# Patient Record
Sex: FEMALE | Race: WHITE | Employment: UNEMPLOYED | URBAN - NONMETROPOLITAN AREA
[De-identification: names, ages, dates, MRNs, and addresses within clinical notes are randomized per-mention and may not be internally consistent; named-entity substitution may affect disease eponyms.]

---

## 2017-02-13 ENCOUNTER — TELEPHONE (OUTPATIENT)
Dept: AUDIOLOGY | Age: 52
End: 2017-02-13

## 2017-02-15 ENCOUNTER — TELEPHONE (OUTPATIENT)
Dept: AUDIOLOGY | Age: 52
End: 2017-02-15

## 2017-02-27 ENCOUNTER — OFFICE VISIT (OUTPATIENT)
Dept: AUDIOLOGY | Age: 52
End: 2017-02-27

## 2017-02-27 DIAGNOSIS — H90.3 SENSORINEURAL HEARING LOSS, BILATERAL: Primary | ICD-10-CM

## 2018-02-14 ENCOUNTER — HOSPITAL ENCOUNTER (OUTPATIENT)
Dept: AUDIOLOGY | Age: 53
Discharge: HOME | End: 2018-02-14

## 2018-02-14 PROCEDURE — 9990000010 HC NO CHARGE VISIT: Performed by: AUDIOLOGIST

## 2018-03-06 ENCOUNTER — HOSPITAL ENCOUNTER (OUTPATIENT)
Dept: AUDIOLOGY | Age: 53
Discharge: HOME | End: 2018-03-06

## 2018-03-06 PROCEDURE — 9990000010 HC NO CHARGE VISIT: Performed by: AUDIOLOGIST

## 2018-03-15 ENCOUNTER — TELEPHONE (OUTPATIENT)
Dept: AUDIOLOGY | Age: 53
End: 2018-03-15

## 2018-03-20 ENCOUNTER — HOSPITAL ENCOUNTER (OUTPATIENT)
Dept: AUDIOLOGY | Age: 53
Discharge: HOME | End: 2018-03-20

## 2018-03-20 PROCEDURE — 9990000010 HC NO CHARGE VISIT: Performed by: AUDIOLOGIST

## 2019-03-20 ENCOUNTER — HOSPITAL ENCOUNTER (OUTPATIENT)
Dept: AUDIOLOGY | Age: 54
Discharge: HOME | End: 2019-03-20

## 2019-03-20 PROCEDURE — 92592 HC HEARING AID CHECK, ONE EAR: CPT | Performed by: AUDIOLOGIST

## 2020-06-23 ENCOUNTER — HOSPITAL ENCOUNTER (OUTPATIENT)
Dept: AUDIOLOGY | Age: 55
Discharge: HOME | End: 2020-06-23

## 2020-06-23 PROCEDURE — 92592 HC HEARING AID CHECK, ONE EAR: CPT | Performed by: AUDIOLOGIST

## 2020-06-26 ENCOUNTER — TELEPHONE (OUTPATIENT)
Dept: AUDIOLOGY | Age: 55
End: 2020-06-26

## 2020-09-10 ENCOUNTER — HOSPITAL ENCOUNTER (OUTPATIENT)
Age: 55
Setting detail: SPECIMEN
Discharge: HOME | End: 2020-09-10
Payer: MEDICARE

## 2020-09-10 LAB
ABSOLUTE EOS #: 0.67 K/UL (ref 0–0.44)
ABSOLUTE IMMATURE GRANULOCYTE: <0.03 K/UL (ref 0–0.3)
ABSOLUTE LYMPH #: 2.42 K/UL (ref 1.1–3.7)
ABSOLUTE MONO #: 0.5 K/UL (ref 0.1–1.2)
ALBUMIN SERPL-MCNC: 4.3 G/DL (ref 3.5–5.2)
ALBUMIN/GLOBULIN RATIO: 1.3 (ref 1–2.5)
ALP BLD-CCNC: 73 U/L (ref 35–104)
ALT SERPL-CCNC: 48 U/L (ref 5–33)
ANION GAP SERPL CALCULATED.3IONS-SCNC: 18 MMOL/L (ref 9–17)
AST SERPL-CCNC: 50 U/L
BASOPHILS # BLD: 1 % (ref 0–2)
BASOPHILS ABSOLUTE: 0.06 K/UL (ref 0–0.2)
BILIRUB SERPL-MCNC: 0.39 MG/DL (ref 0.3–1.2)
BUN BLDV-MCNC: 9 MG/DL (ref 6–20)
BUN/CREAT BLD: ABNORMAL (ref 9–20)
CALCIUM SERPL-MCNC: 10.2 MG/DL (ref 8.6–10.4)
CHLORIDE BLD-SCNC: 102 MMOL/L (ref 98–107)
CHOLESTEROL, FASTING: 136 MG/DL
CHOLESTEROL/HDL RATIO: 3.8
CO2: 20 MMOL/L (ref 20–31)
CREAT SERPL-MCNC: 0.54 MG/DL (ref 0.5–0.9)
DIFFERENTIAL TYPE: ABNORMAL
EOSINOPHILS RELATIVE PERCENT: 9 % (ref 1–4)
GFR AFRICAN AMERICAN: >60 ML/MIN
GFR NON-AFRICAN AMERICAN: >60 ML/MIN
GFR SERPL CREATININE-BSD FRML MDRD: ABNORMAL ML/MIN/{1.73_M2}
GFR SERPL CREATININE-BSD FRML MDRD: ABNORMAL ML/MIN/{1.73_M2}
GLUCOSE BLD-MCNC: 142 MG/DL (ref 70–99)
HCT VFR BLD CALC: 41.4 % (ref 36.3–47.1)
HDLC SERPL-MCNC: 36 MG/DL
HEMOGLOBIN: 13.1 G/DL (ref 11.9–15.1)
IMMATURE GRANULOCYTES: 0 %
LDL CHOLESTEROL: 55 MG/DL (ref 0–130)
LYMPHOCYTES # BLD: 31 % (ref 24–43)
MCH RBC QN AUTO: 29.7 PG (ref 25.2–33.5)
MCHC RBC AUTO-ENTMCNC: 31.6 G/DL (ref 28.4–34.8)
MCV RBC AUTO: 93.9 FL (ref 82.6–102.9)
MONOCYTES # BLD: 7 % (ref 3–12)
NRBC AUTOMATED: 0 PER 100 WBC
PDW BLD-RTO: 13.8 % (ref 11.8–14.4)
PLATELET # BLD: 311 K/UL (ref 138–453)
PLATELET ESTIMATE: ABNORMAL
PMV BLD AUTO: 11.8 FL (ref 8.1–13.5)
POTASSIUM SERPL-SCNC: 4.2 MMOL/L (ref 3.7–5.3)
RBC # BLD: 4.41 M/UL (ref 3.95–5.11)
RBC # BLD: ABNORMAL 10*6/UL
SEG NEUTROPHILS: 52 % (ref 36–65)
SEGMENTED NEUTROPHILS ABSOLUTE COUNT: 4.05 K/UL (ref 1.5–8.1)
SODIUM BLD-SCNC: 140 MMOL/L (ref 135–144)
TOTAL PROTEIN: 7.5 G/DL (ref 6.4–8.3)
TRIGLYCERIDE, FASTING: 224 MG/DL
TSH SERPL DL<=0.05 MIU/L-ACNC: 2.31 MIU/L (ref 0.3–5)
VLDLC SERPL CALC-MCNC: ABNORMAL MG/DL (ref 1–30)
WBC # BLD: 7.7 K/UL (ref 3.5–11.3)
WBC # BLD: ABNORMAL 10*3/UL

## 2021-06-11 ENCOUNTER — OFFICE VISIT (OUTPATIENT)
Dept: BARIATRICS/WEIGHT MGMT | Age: 56
End: 2021-06-11
Payer: MEDICARE

## 2021-06-11 VITALS
DIASTOLIC BLOOD PRESSURE: 74 MMHG | TEMPERATURE: 97 F | HEART RATE: 66 BPM | HEIGHT: 68 IN | WEIGHT: 293 LBS | SYSTOLIC BLOOD PRESSURE: 128 MMHG | BODY MASS INDEX: 44.41 KG/M2

## 2021-06-11 DIAGNOSIS — F32.A DEPRESSION, UNSPECIFIED DEPRESSION TYPE: ICD-10-CM

## 2021-06-11 DIAGNOSIS — E66.01 MORBID OBESITY (HCC): Primary | ICD-10-CM

## 2021-06-11 DIAGNOSIS — E78.00 HYPERCHOLESTEREMIA: ICD-10-CM

## 2021-06-11 DIAGNOSIS — G89.29 CHRONIC PAIN OF LEFT KNEE: ICD-10-CM

## 2021-06-11 DIAGNOSIS — I10 HYPERTENSION, UNSPECIFIED TYPE: ICD-10-CM

## 2021-06-11 DIAGNOSIS — E03.9 HYPOTHYROIDISM, UNSPECIFIED TYPE: ICD-10-CM

## 2021-06-11 DIAGNOSIS — K76.0 FATTY LIVER DISEASE, NONALCOHOLIC: ICD-10-CM

## 2021-06-11 DIAGNOSIS — G89.29 CHRONIC LOW BACK PAIN, UNSPECIFIED BACK PAIN LATERALITY, UNSPECIFIED WHETHER SCIATICA PRESENT: ICD-10-CM

## 2021-06-11 DIAGNOSIS — E28.2 POLYCYSTIC OVARIAN DISEASE: ICD-10-CM

## 2021-06-11 DIAGNOSIS — F41.9 ANXIETY: ICD-10-CM

## 2021-06-11 DIAGNOSIS — G47.30 SLEEP APNEA, UNSPECIFIED TYPE: ICD-10-CM

## 2021-06-11 DIAGNOSIS — K58.9 IRRITABLE BOWEL SYNDROME, UNSPECIFIED TYPE: ICD-10-CM

## 2021-06-11 DIAGNOSIS — K21.9 GASTROESOPHAGEAL REFLUX DISEASE, UNSPECIFIED WHETHER ESOPHAGITIS PRESENT: ICD-10-CM

## 2021-06-11 DIAGNOSIS — M25.562 CHRONIC PAIN OF LEFT KNEE: ICD-10-CM

## 2021-06-11 DIAGNOSIS — M54.50 CHRONIC LOW BACK PAIN, UNSPECIFIED BACK PAIN LATERALITY, UNSPECIFIED WHETHER SCIATICA PRESENT: ICD-10-CM

## 2021-06-11 PROCEDURE — 99204 OFFICE O/P NEW MOD 45 MIN: CPT | Performed by: SURGERY

## 2021-06-11 RX ORDER — ATORVASTATIN CALCIUM 40 MG/1
40 TABLET, FILM COATED ORAL NIGHTLY
COMMUNITY

## 2021-06-11 RX ORDER — CLONIDINE HYDROCHLORIDE 0.1 MG/1
0.1 TABLET ORAL 2 TIMES DAILY
COMMUNITY

## 2021-06-11 RX ORDER — TRAMADOL HYDROCHLORIDE 50 MG/1
50 TABLET ORAL EVERY 6 HOURS PRN
COMMUNITY
End: 2021-08-04

## 2021-06-11 RX ORDER — CHOLECALCIFEROL (VITAMIN D3) 125 MCG
5000 CAPSULE ORAL DAILY
COMMUNITY
End: 2022-03-22 | Stop reason: DRUGHIGH

## 2021-06-11 RX ORDER — HYDROCHLOROTHIAZIDE 50 MG/1
50 TABLET ORAL DAILY
COMMUNITY
Start: 2021-06-02

## 2021-06-11 RX ORDER — GABAPENTIN 600 MG/1
600 TABLET ORAL 3 TIMES DAILY
COMMUNITY

## 2021-06-11 RX ORDER — SITAGLIPTIN 100 MG/1
TABLET, FILM COATED ORAL
COMMUNITY
Start: 2021-06-02 | End: 2021-09-01

## 2021-06-13 ASSESSMENT — ENCOUNTER SYMPTOMS
NAUSEA: 0
DIARRHEA: 0
BLOOD IN STOOL: 0
BACK PAIN: 1
RECTAL PAIN: 0
FACIAL SWELLING: 0
RHINORRHEA: 0
VOICE CHANGE: 0
TROUBLE SWALLOWING: 0
CHEST TIGHTNESS: 0
EYE ITCHING: 0
EYE REDNESS: 0
VOMITING: 0
EYE DISCHARGE: 0
STRIDOR: 0
CHOKING: 0
PHOTOPHOBIA: 0
ANAL BLEEDING: 0
SORE THROAT: 0
COLOR CHANGE: 0
ABDOMINAL PAIN: 0
EYE PAIN: 0
WHEEZING: 0
ABDOMINAL DISTENTION: 0
SINUS PRESSURE: 0
COUGH: 0
ALLERGIC/IMMUNOLOGIC NEGATIVE: 1
CONSTIPATION: 0
SHORTNESS OF BREATH: 0
APNEA: 0

## 2021-06-13 NOTE — PROGRESS NOTES
Greater than 50% of the time was involved counseling, educaton and coordinating care. SUBJECTIVE/OBJECTIVE:    Chief Complaint   Patient presents with    Bariatric, Initial Visit     New Patient Surgery      HPI  Michelle Villa is a 58-year-old female who presents for initial evaluation at the weight management program secondary to her morbid obesity. BMI 48. She has multiple chronic comorbid conditions. Current weight 317 pounds. Denies tobacco abuse. She states she has been overweight since childhood. Has 2 children ages 27 and 39. She has tried multiple times at weight loss without long-term success. She is tried meal replacement programs and medications. She is also tried commercial programs such as [employer] as well as [employer] multiple times. She is also tried other specific low calorie as well as high-protein/low carbohydrate diets without long-term success. She states that one time ketogenic diet allowed her to lose 60 pounds but unfortunately she gained it all back and then some. She states she is in need of a left knee replacement but has to lose weight prior to this. She is not able to do things physically because of the excess body weight. Admits that the excess body weight is also affecting her socially/mentally. Denies current chest or abdominal pain. No hematochezia or melena. No new urinary complaints. Admits that if she is not able to lose enough adequate excess body weight with medical management only she would like to proceed with a Monet-en-Y gastric bypass. Review of Systems   Constitutional: Negative for activity change, appetite change, chills, diaphoresis, fatigue, fever and unexpected weight change. HENT: Negative for congestion, dental problem, drooling, ear discharge, ear pain, facial swelling, hearing loss, mouth sores, nosebleeds, postnasal drip, rhinorrhea, sinus pressure, sneezing, sore throat, tinnitus, trouble swallowing and voice change.     Eyes: Negative for photophobia, pain, discharge, redness, itching and visual disturbance. Respiratory: Negative for apnea, cough, choking, chest tightness, shortness of breath, wheezing and stridor. Cardiovascular: Negative for chest pain, palpitations and leg swelling. Gastrointestinal: Negative for abdominal distention, abdominal pain, anal bleeding, blood in stool, constipation, diarrhea, nausea, rectal pain and vomiting. Endocrine: Negative. Genitourinary: Negative for decreased urine volume, difficulty urinating, dyspareunia, dysuria, enuresis, flank pain, frequency, genital sores, hematuria, menstrual problem, pelvic pain, urgency, vaginal bleeding, vaginal discharge and vaginal pain. Musculoskeletal: Positive for arthralgias, back pain, joint swelling and myalgias. Negative for gait problem, neck pain and neck stiffness. Skin: Negative for color change, pallor, rash and wound. Allergic/Immunologic: Negative. Neurological: Negative for dizziness, tremors, seizures, syncope, facial asymmetry, speech difficulty, weakness, light-headedness, numbness and headaches. Hematological: Negative for adenopathy. Does not bruise/bleed easily. Psychiatric/Behavioral: Negative for agitation, behavioral problems, confusion, decreased concentration, dysphoric mood, hallucinations, self-injury, sleep disturbance and suicidal ideas. The patient is not nervous/anxious and is not hyperactive.         Past Medical History:   Diagnosis Date    Depression     GERD (gastroesophageal reflux disease)     Hypertension     Hypothyroidism     Irritable bowel syndrome     Osteoarthritis     Type II or unspecified type diabetes mellitus without mention of complication, not stated as uncontrolled        Past Surgical History:   Procedure Laterality Date    CHOLECYSTECTOMY      [facility]ONOS[facility]  longer than 3 yr ago    [facility]    KNEE ARTHROSCOPY Left     x2    TUBAL LIGATION         Current Outpatient Medications Cigarettes     Quit date: 4/15/1991     Years since quittin.1    Smokeless tobacco: Never Used   Substance and Sexual Activity    Alcohol use: Not Currently    Drug use: No    Sexual activity: Not on file   Other Topics Concern    Not on file   Social History Narrative    Not on file     Social Determinants of Health     Financial Resource Strain:     Difficulty of Paying Living Expenses:    Food Insecurity:     Worried About Running Out of Food in the Last Year:     [address] in the Last Year:    Transportation Needs:     Lack of Transportation (Medical):  Lack of Transportation (Non-Medical):    Physical Activity:     Days of Exercise per Week:     Minutes of Exercise per Session:    Stress:     Feeling of Stress :    Social Connections:     Frequency of Communication with Friends and Family:     Frequency of Social Gatherings with Friends and Family:     Attends Jainism Services:     Active Member of Clubs or Organizations:     Attends Club or Organization Meetings:     Marital Status:    Intimate Partner Violence:     Fear of Current or Ex-Partner:     Emotionally Abused:     Physically Abused:     Sexually Abused:      Vitals:    21 1004   BP: 128/74   Site: Left Upper Arm   Position: Sitting   Cuff Size: Large Adult   Pulse: 66   Temp: 97 °F (36.1 °C)   TempSrc: Infrared   Weight: (!) 317 lb (143.8 kg)   Height: 5' 8\" (1.727 m)     Body mass index is 48.2 kg/m². Wt Readings from Last 3 Encounters:   21 (!) 317 lb (143.8 kg)   04/15/13 (!) 309 lb (140.2 kg)     Physical Exam  Vitals reviewed. Constitutional:       General: She is not in acute distress. Appearance: She is well-developed. She is not diaphoretic. HENT:      Head: Normocephalic and atraumatic. Right Ear: External ear normal.      Left Ear: External ear normal.      Nose: Nose normal.   Eyes:      General: No scleral icterus. Right eye: No discharge.          Left eye: No discharge. Conjunctiva/sclera: Conjunctivae normal.   Cardiovascular:      Rate and Rhythm: Normal rate and regular rhythm. Heart sounds: Normal heart sounds. Pulmonary:      Effort: Pulmonary effort is normal. No respiratory distress. Breath sounds: Normal breath sounds. No wheezing or rales. Chest:      Chest wall: No tenderness. Abdominal:      General: Bowel sounds are normal. There is no distension. Palpations: Abdomen is soft. There is no mass. Tenderness: There is no abdominal tenderness. There is no guarding or rebound. Musculoskeletal:         General: No tenderness. Normal range of motion. Cervical back: Normal range of motion and neck supple. Skin:     General: Skin is warm and dry. Coloration: Skin is not pale. Findings: No erythema or rash. Neurological:      Mental Status: She is alert and oriented to person, place, and time. Cranial Nerves: No cranial nerve deficit. Psychiatric:         Behavior: Behavior normal.         Thought Content:  Thought content normal.         Judgment: Judgment normal.       CBC   Lab Results   Component Value Date    WBC 7.7 09/10/2020    RBC 4.41 09/10/2020    HGB 13.1 09/10/2020    HCT 41.4 09/10/2020    MCV 93.9 09/10/2020    MCH 29.7 09/10/2020    MCHC 31.6 09/10/2020    RDW 13.8 09/10/2020     09/10/2020    MPV 11.8 09/10/2020    RBCMORP NOT REPORTED 09/10/2020    MONOPCT 7 09/10/2020    LYMPHSABS 2.42 09/10/2020    MONOSABS 0.50 09/10/2020    EOSABS 0.67 09/10/2020    BASOSABS 0.06 09/10/2020      BMP/CMP   Lab Results   Component Value Date    GLUCOSE 142 09/10/2020    CREATININE 0.54 09/10/2020    BUN 9 09/10/2020     09/10/2020    K 4.2 09/10/2020     09/10/2020    CO2 20 09/10/2020    CALCIUM 10.2 09/10/2020    AST 50 09/10/2020    ALKPHOS 73 09/10/2020    PROT 7.5 09/10/2020    LABALBU 4.3 09/10/2020    BILITOT 0.39 09/10/2020    ALT 48 09/10/2020      PREALBUMIN   No results found for: PREALBUMIN   VITAMIN B12   No results found for: ELUGWUBN17   VITAMIN D   No results found for: VITD25   PTH  No results found for: IPTH  VITAMIN B1/ THIAMINE   No results found for: TGSD4OXMNJE   LIPID SCREEN (FASTING)   Lab Results   Component Value Date    HDL 36 09/10/2020   ,   HGA1C (T2DM ONLY)   No results found for: LABA1C, AVGG   TSH   Lab Results   Component Value Date    TSH 2.31 09/10/2020      IRON   No results found for: IRON   TIBC  No results found for: TIBC  FERRITIN  No results found for: FERRITIN  VITAMIN A  No results found for: RETINOL  NICOTINE  No results found for: NMET  UDS  No results found for: UDP  PSA  No results found for: LABPSA  GFR  Lab Results   Component Value Date    LABGLOM >60 09/10/2020     DEXA  No results found for this or any previous visit. Imaging - none    There is no problem list on file for this patient. An electronic signature was used to authenticate this note.     --Sarah Jacinto MD

## 2021-07-02 NOTE — PROGRESS NOTES
sugar free creamer   Snack: no.  Lunch: yes. 12:00p- makes food for pt and boyfriend - sandwich, soup or ravioli, or eggs and leos, waffles  Snack: yes. I have been eating boyfriends leftovers lately d/t boredom  Dinner: yes. 4p-6p- last nite had [employer] queen- 1/2 pound burger and large segal or may bake chicken, crock pot roast, may have green beans and carrots  Snack: yes. - lately getting into ice cream, and had a candy bar last nite  Drinks throughout the day: 1-2 cups coffee per day, 24 oz water glass fills this up twice or more, one diet pepsi or diet dr lovell per day    Do you drink alcohol? No.     Patient does not meet the criteria for binge eating disorder. Patient does have grazing. Patient does not have night eating. Patient does have a history of emotional eating or eating out of boredom. It does take an unusually large amount of food for the patient to feel comfortably full. Patient describes self as fast eater      Patient describes level of activity as sedentary- recent knee ablation. Patient will plan to attend Fitness Orientation on: to be determined with pt coming further away. Gave pt chair exercise booklet to start at home. Pt also belongs to [facility] and will start swimming again    Assessment  Nutritional Needs: [place]-St Jeor = 2082 kcal x 1.2 (activity factor)= 2498 kcal - 500-1000 calories/day  (for 1-2 lb weight loss/week)= 5408-3083 calories per day  Food recall reveals increased refined carbohydrates, increased eating out. Low vegetables and fruit intake  PES Statement:  Overweight/Obesity related to lack of exercise, sedentary lifestyle, unhealthy eating habits, and unsuccessful diet attempts as evidenced by . Body mass index is 48.5 kg/m². .    Surgery  Surgical procedure desired: gastric bypass  Patient's greatest concern about having surgery is: No concerns. Patient describes the motivation for weight loss surgery to be: I want to be able to active with my grandkids.     The expectations following the surgery were reviewed in detail with patient today and patient made aware will require to eliminate carbonated beverages, aim for regular meal times, monitor portion sizes, and balanced meals. .   she does feel she can deal with the dietary restrictions. Patient states she does understand the consequences of not complying with post-op food guidelines. Patient states she understands the long term changes in food intake that will be necessary for all occasions after surgery for the rest of her life. Patient is deemed nutritionally appropriate to proceed if able to show progress towards nutrition behavior changes discussed today. Plan  Patient will begin working on recommendations from Pre-Weight Loss Surgery Behavior Change Goal Sheet that was reviewed today to assist with weight loss and establish a  long term healthy eating pattern. Individual goals set with patient to be reviewed at monthly office visits. Weight loss goal of 3-5% from initial weight at first visit with Dr Lindsey Orofino reviewed with patient to achieve over 3-6 month period per insurance requirements. Initial weight was: 317lbs   3-5% Weight Loss goal will be minimum of: 10-16  lbs weight loss. Plan/Recommendations:    Educational handouts provided and reviewed with patient as follows: Online Support Groups, My Plate Picture Method, Portion Size Guide, Food Journal    -  Patient Instructions   Goals:  1. I will get the lab work done that is mailed to my mailing address before next office visit. You will need to fast 10-12 hours for this (no food or drink besides water). 2.  I will aim for at least 64 oz of water each day (= 8 cups per day or four bottled gomez)  3. I will use my food journal to record meal times, serving sizes and bring back to next dietitian visit. 4.  I will increase my physical activity by going to [facility] for swimming twice a week.   Chair exercises on other days for at least ~ 15-20 minutes daily. 5  Initial weight loss goal of 3-5% is recommended over 3-6 month period. This is a minimum of: 10-16 lbs from your weight when initially met with physician of: 317 lbs. 6.  Bring back info on where referral for mammogram and dexa would go at [address] [place] [address] visit: 4 weeks with dietitian.        Jorge November, RD, LD RD, LD  Dietitian- Weight Management [facility] [address]

## 2021-07-06 ENCOUNTER — OFFICE VISIT (OUTPATIENT)
Dept: BARIATRICS/WEIGHT MGMT | Age: 56
End: 2021-07-06

## 2021-07-06 VITALS — BODY MASS INDEX: 44.41 KG/M2 | HEIGHT: 68 IN | WEIGHT: 293 LBS

## 2021-07-06 DIAGNOSIS — Z78.0 POST-MENOPAUSAL: ICD-10-CM

## 2021-07-06 DIAGNOSIS — Z13.21 MALNUTRITION SCREEN: ICD-10-CM

## 2021-07-06 DIAGNOSIS — E66.01 MORBID OBESITY WITH BMI OF 45.0-49.9, ADULT (HCC): ICD-10-CM

## 2021-07-06 DIAGNOSIS — Z12.31 ENCOUNTER FOR SCREENING MAMMOGRAM FOR MALIGNANT NEOPLASM OF BREAST: ICD-10-CM

## 2021-07-06 DIAGNOSIS — E66.01 MORBID OBESITY WITH BMI OF 45.0-49.9, ADULT (HCC): Primary | ICD-10-CM

## 2021-07-06 DIAGNOSIS — Z01.818 PRE-OP TESTING: Primary | ICD-10-CM

## 2021-07-06 NOTE — PATIENT INSTRUCTIONS
Goals: 1. I will get the lab work done that is mailed to my mailing address before next office visit. You will need to fast 10-12 hours for this (no food or drink besides water). 2.  I will aim for at least 64 oz of water each day (= 8 cups per day or four bottled gomez)  3. I will use my food journal to record meal times, serving sizes and bring back to next dietitian visit. 4.  I will increase my physical activity by going to [facility] for swimming twice a week. Chair exercises on other days for at least ~ 15-20 minutes daily. 5  Initial weight loss goal of 3-5% is recommended over 3-6 month period. This is a minimum of: 10-16 lbs from your weight when initially met with physician of: 317 lbs.     6.  Bring back info on where referral for mammogram and dexa would go at [facility]

## 2021-08-03 NOTE — PROGRESS NOTES
Assessment: Patient is a 61 y.o. female seen for   month one  follow up MNT visit for  pre op bariatric surgery desires sleeve    Vitals from current and previous visits:  Vitals 1/2/9487   SYSTOLIC 654   DIASTOLIC 76   Site Right Upper Arm   Position Sitting   Cuff Size Large Adult   Pulse 84   Temp 97.5   Resp 18   Weight 308 lb 3.2 oz   Height 5' 8\"   Body mass index 46.86 kg/m2       Initial weight at start of Weight Management Program was: 317lbs     Maye lost 9 lbs over one month  -Weight goal: lose weight.     -Nutritionally relevant labs: Initial labs scanned into media section- Vit D-26.9, B1-161, elevated LFTs  Lab Results   Component Value Date/Time    GLUCOSE 142 (H) 09/10/2020 10:55 AM    HDL 36 (L) 09/10/2020 10:55 AM     States dx'd with fatty Liver in past.  - Is patient taking daily Multivitamin:  MVI daily pill form- Centrum Silver, Vitamin D3 two pills once a day    Pt and boyfriend in house. Does not work. Goes to food pantry once a month  Pt goes to bed around ~ midnite (lays in bed starting at 7p) and is up by 9am    -Food Recall:  Pt reports started following low carbohydrate/Keto  eating style since last here  Breakfast: oatmeal or greek yogurt or may have Slim Fast Keto Shake  Lunch: two slices of bologna on keto bread and pork rinds. Dinner: roast with carrot and green beans or fried green tomatoes, roast.   Snacks: nuts, or two hard boiled eggs, two pieces cheese or keto cookies  Main Beverages: down to 1 cup of coffee a day with sugar free creamer, now has 32 oz glass using for water and filling this up twice a day, down to only five diet pops over the last month      -Impression of Dietary Intake: low fruit/carbohydrate intake. - Pt  is working towards avoiding high fat/high sugar foods. Pt  is working towards  including protein at meals and snacks.     Exercise:  Patient has not attended Fitness Orientation- deferred at this time.  -Physical Activity is:  Gave pt chair exercise booklet and tried these a couple days \"I had trouble getting myself motivated to do this\"   Also hasn't been going to [facility] for swimming. Pt wants to start outpatient water therapy for exercise- referral being placed today. Nutrition Diagnosis: Overweight/Obesity related to currently undergoing MNT as evidenced by BMI of 46.8    Intervention:   Healthy behaviors: consistently logging food intake and adequate fluid intake  Patient has not attended support group online yet. Reviewed with pt educational handouts and monthly goals. Handouts given: Top Notch Protein Foods and Reducing Fat and Calories in Meal Planning  . Patient Instructions   Goals:  1. Nutrition Goal:  I will use my food journal to record meal times, serving sizes and bring back to next dietitian visit  2  Exercise Goal:  Start outpatient water therapy. Keep working on chair exercises at home ~ 15-20 minutes every day  3. Water Goal:  Great job with water intake- continue at least 64 oz or greater a day. Goal is no pop  4. Start Vitamin D3 5,000IU's every day with food. - can get this over the counter  5. Can watch online You Tube videos for support groups            -Followup visit: 4 weeks with dietitian.        Bruce Sever, RD, LD, RD, LD  Dietitian- Weight Management [address]

## 2021-08-04 ENCOUNTER — OFFICE VISIT (OUTPATIENT)
Dept: BARIATRICS/WEIGHT MGMT | Age: 56
End: 2021-08-04
Payer: MEDICARE

## 2021-08-04 ENCOUNTER — OFFICE VISIT (OUTPATIENT)
Dept: BARIATRICS/WEIGHT MGMT | Age: 56
End: 2021-08-04

## 2021-08-04 VITALS
SYSTOLIC BLOOD PRESSURE: 122 MMHG | HEIGHT: 68 IN | TEMPERATURE: 97.5 F | HEART RATE: 84 BPM | DIASTOLIC BLOOD PRESSURE: 76 MMHG | RESPIRATION RATE: 18 BRPM | BODY MASS INDEX: 44.41 KG/M2 | WEIGHT: 293 LBS

## 2021-08-04 DIAGNOSIS — E11.69 DIABETES MELLITUS TYPE 2 IN OBESE (HCC): ICD-10-CM

## 2021-08-04 DIAGNOSIS — G47.33 OSA ON CPAP: ICD-10-CM

## 2021-08-04 DIAGNOSIS — M25.561 CHRONIC PAIN OF BOTH KNEES: ICD-10-CM

## 2021-08-04 DIAGNOSIS — E55.9 VITAMIN D DEFICIENCY: ICD-10-CM

## 2021-08-04 DIAGNOSIS — M25.562 CHRONIC PAIN OF BOTH KNEES: ICD-10-CM

## 2021-08-04 DIAGNOSIS — F41.9 ANXIETY: ICD-10-CM

## 2021-08-04 DIAGNOSIS — E66.01 MORBID OBESITY WITH BMI OF 45.0-49.9, ADULT (HCC): Primary | ICD-10-CM

## 2021-08-04 DIAGNOSIS — I10 HYPERTENSION, UNSPECIFIED TYPE: ICD-10-CM

## 2021-08-04 DIAGNOSIS — M54.50 CHRONIC LOW BACK PAIN, UNSPECIFIED BACK PAIN LATERALITY, UNSPECIFIED WHETHER SCIATICA PRESENT: ICD-10-CM

## 2021-08-04 DIAGNOSIS — E03.9 HYPOTHYROIDISM, UNSPECIFIED TYPE: ICD-10-CM

## 2021-08-04 DIAGNOSIS — E66.9 DIABETES MELLITUS TYPE 2 IN OBESE (HCC): ICD-10-CM

## 2021-08-04 DIAGNOSIS — G89.29 CHRONIC LOW BACK PAIN, UNSPECIFIED BACK PAIN LATERALITY, UNSPECIFIED WHETHER SCIATICA PRESENT: ICD-10-CM

## 2021-08-04 DIAGNOSIS — F32.A DEPRESSION, UNSPECIFIED DEPRESSION TYPE: ICD-10-CM

## 2021-08-04 DIAGNOSIS — K76.0 FATTY LIVER DISEASE, NONALCOHOLIC: ICD-10-CM

## 2021-08-04 DIAGNOSIS — Z99.89 OSA ON CPAP: ICD-10-CM

## 2021-08-04 DIAGNOSIS — G89.29 CHRONIC PAIN OF BOTH KNEES: ICD-10-CM

## 2021-08-04 PROCEDURE — 99214 OFFICE O/P EST MOD 30 MIN: CPT | Performed by: PHYSICIAN ASSISTANT

## 2021-08-04 NOTE — PROGRESS NOTES
[address] [facility]  [address]  [facility], [address] [facility]  [phone]      Visit Date:  8/4/2021  Weight Management Pre-Op Follow-up    HPI:    Medically Supervised follow-up- Month #1 of 3/6- desires RYGB    Kim Hernandez is here today for continued supervised weight management of morbid obesity. Has struggled with her weight for most of her life. Has been able to lose weigh ton her own but has not been able to sustain weight loss. Currently disabled due to chronic back pain. Wants to feel better. Wants to be more active. Weight today 308#. Down 9# since last appointment. BMI 46. She reports that she is working on the behavior changes discussed at her initial appointment. Has started to make changes to nutrition. Has decreased carbohydrates. Has been trying to 3 meals daily and 1-2 snacks per day. Tracking all food intake. Needs to work on portion control. Craves sweets, but trying to avoid. Admits to being an emotional and night time eater. Drinks 1 bottle of diet soda daily. No juice. Drinks 1 cup of coffee daily. Does not drink alcohol. Not a smoker. Has a good support system through family. Long discussion on importance of lifestyle changes, making better decisions with nutrition and increasing dedicated activity to be successful lifelong with weight loss with or without bariatric surgery. Comorbid conditions include sleep apnea tx with CPAP, depression, anxiety, PCOS, fatty liver, GERD, IBS, hypothyroid, hypertension, diabetes mellitus, hyperlipidemia, chronic back and knee pain. Checking blood sugars once daily and running . No hypoglycemia. Wearing CPAP 4-6 hours Qhs. Follows with Metsa 68. Initial labs completed and reviewed . Vit D 26/ Pth 73- advised to start OTC Vit D3. Slightly elevated liver enzymes- hx fatty liver- following with PCP. LOMN received. Has not completed support groups. EKG to be completed ASAP. Discussed pre-op EGD. Cardiac clearance prn. Pulmonary clearance needed prior to surgery. Psychological clearance with Dr. Creston Mohs 21 and 10/21/21. Mammogram and Dexa scan scheduled for 21. If she does not lose enough weight with medical management she would like to proceed with RYGB. Physical Activity: chair exercises, at times. Has [employer]. Would like to start water therapy. Current BMI: Body mass index is 46.86 kg/m².   Current Weight:   Wt Readings from Last 3 Encounters:   21 (!) 308 lb 3.2 oz (139.8 kg)   21 (!) 319 lb (144.7 kg)   21 (!) 317 lb (143.8 kg)     Initial Body Weight:317    Past Medical History:  Past Medical History:   Diagnosis Date    Depression     GERD (gastroesophageal reflux disease)     Hypertension     Hypothyroidism     Irritable bowel syndrome     Osteoarthritis     Type II or unspecified type diabetes mellitus without mention of complication, not stated as uncontrolled        Past Surgical History:  Past Surgical History:   Procedure Laterality Date    CHOLECYSTECTOMY      COLONOSCOPY  longer than 3 yr ago    [facility]    KNEE ARTHROSCOPY Left     x2    OTHER SURGICAL HISTORY      knee ablation     TUBAL LIGATION         Past Social History:  Social History     Socioeconomic History    Marital status: Single     Spouse name: Not on file    Number of children: Not on file    Years of education: Not on file    Highest education level: Not on file   Occupational History    Not on file   Tobacco Use    Smoking status: Former Smoker     Packs/day: 2.00     Types: Cigarettes     Quit date: 4/15/1991     Years since quittin.3    Smokeless tobacco: Never Used   Substance and Sexual Activity    Alcohol use: Not Currently    Drug use: No    Sexual activity: Not on file   Other Topics Concern    Not on file   Social History Narrative    Not on file     Social Determinants of Health     Financial Resource Strain:     Difficulty of Paying Living Expenses:    Food Insecurity:     Worried About Running Out of Food in the Last Year:     [address] in the Last Year:    Transportation Needs:     Lack of Transportation (Medical):  Lack of Transportation (Non-Medical):    Physical Activity:     Days of Exercise per Week:     Minutes of Exercise per Session:    Stress:     Feeling of Stress :    Social Connections:     Frequency of Communication with Friends and Family:     Frequency of Social Gatherings with Friends and Family:     Attends Jewish Services:     Active Member of Clubs or Organizations:     Attends Club or Organization Meetings:     Marital Status:    Intimate Partner Violence:     Fear of Current or Ex-Partner:     Emotionally Abused:     Physically Abused:     Sexually Abused:         Medications:   Current Outpatient Medications   Medication Sig Dispense Refill    Diclofenac Sodium 1 % CREA Apply topically      Insulin Glargine (TOUJEO SOLOSTAR SC) Inject 160 Units into the skin daily      gabapentin (NEURONTIN) 800 MG tablet Take 1,000 mg by mouth 3 times daily.  vitamin D (CHOLECALCIFEROL) 50 MCG (2000 UT) TABS tablet Take 4,000 Units by mouth daily      atorvastatin (LIPITOR) 40 MG tablet Take 40 mg by mouth daily      hydroCHLOROthiazide (HYDRODIURIL) 50 MG tablet       JANUVIA 100 MG tablet       cloNIDine (CATAPRES) 0.1 MG tablet Take 0.1 mg by mouth 2 times daily      DICLOFENAC PO Take 75 mg by mouth daily      levothyroxine (SYNTHROID) 88 MCG tablet Take 137 mcg by mouth Daily       Citalopram Hydrobromide (CELEXA PO) Take  by mouth daily.  CarBAMazepine (TEGRETOL PO) Take 200 mg by mouth 2 times daily       AMLODIPINE BESYLATE PO Take  by mouth daily.  GLIMEPIRIDE PO Take  by mouth 2 times daily.  aspirin 81 MG tablet Take 81 mg by mouth daily. No current facility-administered medications for this visit. Allergies:    Allergies   Allergen Reactions    Ampicillin Hives    Motrin [Ibuprofen] Hives       Subjective:    Review of Systems:  Constitutional: Denies any fever, chills,(+) fatigue. Wound: Denies any rash, skin color changes or wound problems. Resp: Denies any cough, (+)shortness of breath. CV: Denies any chest pain, orthopnea or syncope. MS: Denies myalgias,(+) arthralgias- back/knees  GI: (+) intermittent nausea with medications. Denies  vomiting, (+)diarrhea/ constipation, melena, hematochezia. : Denies any hematuria, hesitancy or dysuria. NEURO: Denies seizures, (+) headache. Objective:    /76 (Site: Right Upper Arm, Position: Sitting, Cuff Size: Large Adult)   Pulse 84   Temp 97.5 °F (36.4 °C) (Infrared)   Resp 18   Ht 5' 8\" (1.727 m)   Wt (!) 308 lb 3.2 oz (139.8 kg)   BMI 46.86 kg/m²   Constitutional: Alert and oriented to person, place and time. Well-developed, well- nourished. Head: Normocephalic and atraumatic  Neck: Supple. Eyes: EOMI b/l. Conjunctivae normal.  No scleral icterus. Respiratory: Effort normal. No respiratory distress. Abdomen: Obese  Ext:  Movement x 4. No edema  Skin; Warm and dry, no visible rashes, lesions or ulcers.    Neuro: Cranial Nerves Grossly Intact; nml coordination    CBC   Lab Results   Component Value Date    WBC 7.7 09/10/2020    RBC 4.41 09/10/2020    HGB 13.1 09/10/2020    HCT 41.4 09/10/2020    MCV 93.9 09/10/2020    MCH 29.7 09/10/2020    MCHC 31.6 09/10/2020    RDW 13.8 09/10/2020     09/10/2020    MPV 11.8 09/10/2020    RBCMORP NOT REPORTED 09/10/2020    MONOPCT 7 09/10/2020    LYMPHSABS 2.42 09/10/2020    MONOSABS 0.50 09/10/2020    EOSABS 0.67 09/10/2020    BASOSABS 0.06 09/10/2020        BMP/CMP   Lab Results   Component Value Date    GLUCOSE 142 09/10/2020    CREATININE 0.54 09/10/2020    BUN 9 09/10/2020     09/10/2020    K 4.2 09/10/2020     09/10/2020    CO2 20 09/10/2020    CALCIUM 10.2 09/10/2020    AST 50 09/10/2020    ALKPHOS 73 09/10/2020    PROT 7.5 09/10/2020    LABALBU 4.3 09/10/2020    BILITOT 0.39 09/10/2020    ALT 48 09/10/2020        PREALBUMIN   No results found for: PREALBUMIN     VITAMIN B12   No results found for: [id number]     VITAMIN D   No results found for: VITD25     PTH  No results found for: IPTH    VITAMIN B1/ THIAMINE   No results found for: [id number]     LIPID SCREEN (FASTING)   Lab Results   Component Value Date    HDL 36 09/10/2020   ,     HGA1C (T2DM ONLY)   No results found for: LABA1C, AVGG     TSH   Lab Results   Component Value Date    TSH 2.31 09/10/2020        IRON   No results found for: IRON     TIBC  No results found for: TIBC    FERRITIN  No results found for: FERRITIN    VITAMIN A  No results found for: RETINOL    NICOTINE  No results found for: NMET    UDS  No results found for: UDP    PSA  No results found for: LABPSA    GFR  Lab Results   Component Value Date    LABGLOM >60 09/10/2020       DEXA  No results found for this or any previous visit. Assessment:       Diagnosis Orders   1. BMI 45.0-49.9, adult [facility])  External Referral To Physical Therapy   2. Depression, unspecified depression type     3. Anxiety     4. Fatty liver disease, nonalcoholic     5. Hypothyroidism, unspecified type     6. Hypertension, unspecified type     7. Diabetes mellitus type 2 in obese ([address]     8. Vitamin D deficiency     9. DIANNE on CPAP     10. Chronic low back pain, unspecified back pain laterality, unspecified whether sciatica present  External Referral To Physical Therapy   11. Chronic pain of both knees  External Referral To Physical Therapy       Plan:    · Behavior modification discussed in detail in regards to dietary habits. · Nutritional education occurred during visit. Continue following recommendations  per dietitian. · Improvement in fitness/exercise discussed with patient and the need for this  with/without surgery. · Schedule orientation with [address], Exercise Physiologist, at the fitness  center.   · Cardiac

## 2021-08-04 NOTE — PATIENT INSTRUCTIONS
· Behavior modification discussed in detail in regards to dietary habits. · Nutritional education occurred during visit. Continue following recommendations  per dietitian. · Improvement in fitness/exercise discussed with patient and the need for this  with/without surgery. · Schedule orientation with Bob Fall, Exercise Physiologist, at the fitness  center. · Cardiac Clearance needed prior to surgery prn  · Pulmonary Clearance needed prior to surgery- [facility]wari  · Psychology evaluation with Dr. Joe House 9/29/21 and 10/21/21  · Water therapy order sent today  · Physical Therapy referral  · EGD prior to any surgical intervention  · Encouraged to attend support groups. · Goal to lose 3-5% TBW prior to surgery. · Seca scale next month  · Initial labs completed and reviewed  · Vit D 26/ Pth 73- advised to start OTC Vit D3 5,0000IU daily. · EKG to be completed asap  · Advised not to get pregnant for 18-24 months post bariatric surgery as this can increase risk of malnourishment potentially leading to low birth weight or malformation. · No lifting/pushing/pulling over 20# for 4 weeks post-op  · No NSAIDS 10 days prior to bariatric surgery.  Avoid NSAID use post-op  · Discussed Lovenox post-op bariatric surgery  · LOMN received  ·  Mammogram and Dexa scan scheduled for 8/9/21

## 2021-08-09 ENCOUNTER — HOSPITAL ENCOUNTER (OUTPATIENT)
Dept: WOMENS IMAGING | Age: 56
Discharge: HOME | End: 2021-08-09
Payer: MEDICARE

## 2021-08-09 DIAGNOSIS — Z13.21 MALNUTRITION SCREEN: ICD-10-CM

## 2021-08-09 DIAGNOSIS — Z01.818 PRE-OP TESTING: ICD-10-CM

## 2021-08-09 DIAGNOSIS — Z12.31 ENCOUNTER FOR SCREENING MAMMOGRAM FOR MALIGNANT NEOPLASM OF BREAST: ICD-10-CM

## 2021-08-09 DIAGNOSIS — E66.01 MORBID OBESITY WITH BMI OF 45.0-49.9, ADULT (HCC): ICD-10-CM

## 2021-08-09 DIAGNOSIS — Z78.0 POST-MENOPAUSAL: ICD-10-CM

## 2021-08-09 PROCEDURE — 77063 BREAST TOMOSYNTHESIS BI: CPT

## 2021-08-09 PROCEDURE — 77080 DXA BONE DENSITY AXIAL: CPT

## 2021-08-10 ENCOUNTER — HOSPITAL ENCOUNTER (OUTPATIENT)
Dept: WOMENS IMAGING | Age: 56
Discharge: HOME | End: 2021-08-10

## 2021-08-10 DIAGNOSIS — Z00.6 ENCOUNTER FOR EXAMINATION FOR NORMAL COMPARISON OR CONTROL IN CLINICAL RESEARCH PROGRAM: ICD-10-CM

## 2021-08-31 NOTE — PROGRESS NOTES
Assessment: Patient is a 61 y.o. female seen for  Month two  follow up MNT visit for pre op bariatric desires bypass    Vitals from current and previous visits:    Vitals 5/7/0890   SYSTOLIC 890   DIASTOLIC 64   Site Right Lower Arm   Position Sitting   Cuff Size Medium Adult   Pulse 76   Temp 98.8   Resp    Weight 304 lb   Height 5' 8\"   Body mass index 46.22 kg/m2       Initial weight at start of Weight Management Program was: 317lbs     Maye lost 4 lbs over one month  -Weight goal: lose weight.     -Nutritionally relevant labs: Initial labs scanned into media section- Vit D-26.9, B1-161, elevated LFTs  Lab Results   Component Value Date/Time    GLUCOSE 142 (H) 09/10/2020 10:55 AM    HDL 36 (L) 09/10/2020 10:55 AM     States dx'd with fatty Liver in past.  - Is patient taking daily Multivitamin:  MVI daily pill form- Centrum Silver, Vitamin D3  Taking VitD 3 now 5,000IU's daily (advised pt could stop the smaller dose Vitamin D with taking higher amount)    Pt and boyfriend in house. Does not work. Goes to food pantry once a month- recently received cake pops from food pantry and did give some of them to a friend to keep from eating too many  Pt goes to bed around ~ midnite (lays in bed starting at 7p) and is up by 9am    -Food Recall: States watching fat content closer from last month. Pt had food journal in office for review  Breakfast: two hard boiled eggs this am or greek yogurt or latmeal  Lunch: lunch meat and cheese, some keto bread or low carb tortilla with leos, eggs and spinach and onion  Dinner:  Eggs and toast or bowl of soup or salad and brat tj.    Snacks- added fruit as a snack to increase fruit intake, some nuts  Main Beverages: 1 cup of coffee a day with sugar free creamer-sometimes 2 cups,   32 oz glass using for water and filling this up twice a day, has omitted all pop in last three weeks      -Impression of Dietary Intake: lower carbohydrate intake per pt preference - Pt  is working towards avoiding high fat/high sugar foods. Pt  is working towards  including protein at meals and snacks. Exercise:  Patient has not attended Fitness Orientation- deferred at this time.  -Physical Activity is: Pt started water therapy for exercise- two days a week . Pt has also joined the [employer]. Some chair exercises at home      Nutrition Diagnosis: Overweight/Obesity related to currently undergoing MNT as evidenced by BMI of 46.2    Intervention:   Healthy behaviors: consistently logging food intake and adequate fluid intake  Patient has attended You Tube Videos onilne times one and plans to watch additional You Tube videos. Pt engaged in program and continues to work towards nutrition behaviors recommended for bariatric surgery. Goals reviewed with pt  Patient Instructions   Goals:  1. Nutrition Goal:  I will use my food journal to record meal times, serving sizes and bring back to next dietitian visit  2  Exercise Goal:  Continue outpatient water therapy twice a week or start swimming at [employer] at least twice a week. 3. Water Goal:  Great job with water intake- continue at least 64 oz or greater a day. 4.  Can continue to watch online You Tube videos for support groups                  -Followup visit: 4 weeks with dietitian.        Levi Blancas RD, LD, RD, LD  Dietitian- Weight Management [address] [place] [address]

## 2021-09-01 ENCOUNTER — OFFICE VISIT (OUTPATIENT)
Dept: BARIATRICS/WEIGHT MGMT | Age: 56
End: 2021-09-01

## 2021-09-01 ENCOUNTER — OFFICE VISIT (OUTPATIENT)
Dept: BARIATRICS/WEIGHT MGMT | Age: 56
End: 2021-09-01
Payer: MEDICARE

## 2021-09-01 VITALS
SYSTOLIC BLOOD PRESSURE: 108 MMHG | HEART RATE: 76 BPM | DIASTOLIC BLOOD PRESSURE: 64 MMHG | TEMPERATURE: 98.8 F | BODY MASS INDEX: 44.41 KG/M2 | WEIGHT: 293 LBS | HEIGHT: 68 IN

## 2021-09-01 DIAGNOSIS — G89.29 CHRONIC PAIN OF BOTH KNEES: ICD-10-CM

## 2021-09-01 DIAGNOSIS — G89.29 CHRONIC LOW BACK PAIN, UNSPECIFIED BACK PAIN LATERALITY, UNSPECIFIED WHETHER SCIATICA PRESENT: ICD-10-CM

## 2021-09-01 DIAGNOSIS — E11.69 DIABETES MELLITUS TYPE 2 IN OBESE (HCC): ICD-10-CM

## 2021-09-01 DIAGNOSIS — M25.561 CHRONIC PAIN OF BOTH KNEES: ICD-10-CM

## 2021-09-01 DIAGNOSIS — E66.9 DIABETES MELLITUS TYPE 2 IN OBESE (HCC): ICD-10-CM

## 2021-09-01 DIAGNOSIS — E66.01 MORBID OBESITY WITH BMI OF 45.0-49.9, ADULT (HCC): Primary | ICD-10-CM

## 2021-09-01 DIAGNOSIS — E03.9 HYPOTHYROIDISM, UNSPECIFIED TYPE: ICD-10-CM

## 2021-09-01 DIAGNOSIS — E55.9 VITAMIN D DEFICIENCY: ICD-10-CM

## 2021-09-01 DIAGNOSIS — K76.0 FATTY LIVER DISEASE, NONALCOHOLIC: ICD-10-CM

## 2021-09-01 DIAGNOSIS — M54.50 CHRONIC LOW BACK PAIN, UNSPECIFIED BACK PAIN LATERALITY, UNSPECIFIED WHETHER SCIATICA PRESENT: ICD-10-CM

## 2021-09-01 DIAGNOSIS — G47.33 OSA ON CPAP: ICD-10-CM

## 2021-09-01 DIAGNOSIS — M25.562 CHRONIC PAIN OF BOTH KNEES: ICD-10-CM

## 2021-09-01 DIAGNOSIS — F32.A DEPRESSION, UNSPECIFIED DEPRESSION TYPE: ICD-10-CM

## 2021-09-01 DIAGNOSIS — I10 HYPERTENSION, UNSPECIFIED TYPE: ICD-10-CM

## 2021-09-01 DIAGNOSIS — Z99.89 OSA ON CPAP: ICD-10-CM

## 2021-09-01 DIAGNOSIS — F41.9 ANXIETY: ICD-10-CM

## 2021-09-01 PROCEDURE — 99213 OFFICE O/P EST LOW 20 MIN: CPT | Performed by: PHYSICIAN ASSISTANT

## 2021-09-01 RX ORDER — LOSARTAN POTASSIUM 100 MG/1
50 TABLET ORAL DAILY
COMMUNITY

## 2021-09-01 RX ORDER — MULTIVITAMIN
TABLET ORAL
COMMUNITY
Start: 2020-10-12 | End: 2022-03-22 | Stop reason: ALTCHOICE

## 2021-09-01 NOTE — PROGRESS NOTES
[address] [facility]  [address]  [place], [address] [facility]  [phone]      Visit Date:  9/1/2021  Weight Management Pre-Op Follow-up    HPI:    Medically Supervised follow-up- Month #2 of 3/6- desires RYGB    Yogi Matson is here today for continued supervised weight management of morbid obesity. Weight today 304#. Down 4# since last month. Down 13# since starting with weight management. BMI 46. Reports that she did pretty well with nutrition over the month. More good days than bad. Has been mindful of food choices. Continues to track food intake. Continues to work on portion control- doing better following recommendations. Drinking at least 64oz of water daily. No pop in the last 2 weeks. Drinking 1-2 cups of coffee daily. Comorbid conditions include sleep apnea tx with CPAP, depression, anxiety, PCOS, fatty liver, GERD, IBS, hypothyroid, hypertension, diabetes mellitus, hyperlipidemia, chronic back and knee pain. Continues to monitor blood sugars and running . No hypoglycemia. Has started water therapy- seeing improvement. Getting stronger. LOMN received. Completed support groups x 1. EKG completed- awaiting result. Discussed pre-op EGD-will send referral. Cardiac clearance prn. Continues CPAP 4-6 hours per night- Pulmonary clearance needed prior to surgery. Needs to schedule apt with Luis Beck to call today. Psychological clearance with Dr. Dennys Baker 9/29/21 and 10/21/21. Mammogram and Dexa scan completed- no concerning findings. Continues OTC Vit D for an initial level of 26. If she does not lose enough weight with medical management she would like to proceed with RYGB. Physical Activity: Water therapy twice per week. .      Current BMI: Body mass index is 46.22 kg/m².   Current Weight:   Wt Readings from Last 3 Encounters:   09/01/21 (!) 304 lb (137.9 kg)   08/04/21 (!) 308 lb 3.2 oz (139.8 kg)   07/06/21 (!) 319 lb (144.7 kg)     Initial Body [id number]    Past Medical History:  Past Medical History:   Diagnosis Date    Depression     GERD (gastroesophageal reflux disease)     Hypertension     Hypothyroidism     Irritable bowel syndrome     Osteoarthritis     Type II or unspecified type diabetes mellitus without mention of complication, not stated as uncontrolled        Past Surgical History:  Past Surgical History:   Procedure Laterality Date    CHOLECYSTECTOMY      COLONOSCOPY  longer than 3 yr ago    [facility]    KNEE ARTHROSCOPY Left     x2    OTHER SURGICAL HISTORY      knee ablation     TUBAL LIGATION         Past Social History:  Social History     Socioeconomic History    Marital status: Single     Spouse name: Not on file    Number of children: Not on file    Years of education: Not on file    Highest education level: Not on file   Occupational History    Not on file   Tobacco Use    Smoking status: Former Smoker     Packs/day: 2.00     Types: Cigarettes     Quit date: 4/15/1991     Years since quittin.4    Smokeless tobacco: Never Used   Substance and Sexual Activity    Alcohol use: Not Currently    Drug use: No    Sexual activity: Not on file   Other Topics Concern    Not on file   Social History Narrative    Not on file     Social Determinants of Health     Financial Resource Strain:     Difficulty of Paying Living Expenses:    Food Insecurity:     Worried About Running Out of Food in the Last Year:     Ran Out of Food in the Last Year:    Transportation Needs:     Lack of Transportation (Medical):      Lack of Transportation (Non-Medical):    Physical Activity:     Days of Exercise per Week:     Minutes of Exercise per Session:    Stress:     Feeling of Stress :    Social Connections:     Frequency of Communication with Friends and Family:     Frequency of Social Gatherings with Friends and Family:     Attends Shinto Services:     Active Member of Clubs or Organizations:     Attends Club or Organization Meetings:     Marital Status:    Intimate Partner Violence:     Fear of Current or Ex-Partner:     Emotionally Abused:     Physically Abused:     Sexually Abused:         Medications:   Current Outpatient Medications   Medication Sig Dispense Refill    SITagliptin (JANUVIA) 100 MG tablet Take 100 mg by mouth daily      losartan (COZAAR) 100 MG tablet Take 100 mg by mouth daily      Daily Multiple Vitamins TABS Take by mouth      Insulin Glargine (TOUJEO SOLOSTAR SC) Inject 160 Units into the skin daily      gabapentin (NEURONTIN) 600 MG tablet Take 600 mg by mouth 3 times daily.  Cholecalciferol (VITAMIN D) 125 MCG (5000 UT) CAPS Take 4,000 Units by mouth daily       atorvastatin (LIPITOR) 40 MG tablet Take 40 mg by mouth daily      hydroCHLOROthiazide (HYDRODIURIL) 50 MG tablet       cloNIDine (CATAPRES) 0.1 MG tablet Take 0.1 mg by mouth 2 times daily      DICLOFENAC PO Take 50 mg by mouth 2 times daily       Levothyroxine Sodium 137 MCG CAPS Take 137 mcg by mouth Daily       Citalopram Hydrobromide (CELEXA PO) Take 20 mg by mouth daily       CarBAMazepine (TEGRETOL PO) Take 200 mg by mouth 2 times daily       AMLODIPINE BESYLATE PO Take  by mouth daily.  aspirin 81 MG tablet Take 81 mg by mouth daily. No current facility-administered medications for this visit. Allergies: Allergies   Allergen Reactions    Ampicillin Hives    Motrin [Ibuprofen] Hives       Subjective:    Review of Systems:  Constitutional: Denies any fever, chills,(+) fatigue. Wound: Denies any rash, skin color changes or wound problems. Resp: Denies any cough, (+)shortness of breath. CV: Denies any chest pain, orthopnea or syncope. MS: Denies myalgias,(+) arthralgias- back/knees  GI: (+) intermittent nausea with medications. Denies  vomiting, (+)diarrhea/ constipation, melena, hematochezia. : Denies any hematuria, hesitancy or dysuria.    NEURO: Denies seizures, (+) headache. Objective:    /64 (Site: Right Lower Arm, Position: Sitting, Cuff Size: Medium Adult)   Pulse 76   Temp 98.8 °F (37.1 °C) (Oral)   Ht 5' 8\" (1.727 m)   Wt (!) 304 lb (137.9 kg)   BMI 46.22 kg/m²   Constitutional: Alert and oriented to person, place and time. Well-developed, well- nourished. Head: Normocephalic and atraumatic  Neck: Supple. Eyes: EOMI b/l. Conjunctivae normal.  No scleral icterus. Respiratory: Effort normal. No respiratory distress. Abdomen: Obese  Ext:  Movement x 4. No edema  Skin; Warm and dry, no visible rashes, lesions or ulcers.    Neuro: Cranial Nerves Grossly Intact; nml coordination    CBC   Lab Results   Component Value Date    WBC 7.7 09/10/2020    RBC 4.41 09/10/2020    HGB 13.1 09/10/2020    HCT 41.4 09/10/2020    MCV 93.9 09/10/2020    MCH 29.7 09/10/2020    MCHC 31.6 09/10/2020    RDW 13.8 09/10/2020     09/10/2020    MPV 11.8 09/10/2020    RBCMORP NOT REPORTED 09/10/2020    MONOPCT 7 09/10/2020    LYMPHSABS 2.42 09/10/2020    MONOSABS 0.50 09/10/2020    EOSABS 0.67 09/10/2020    BASOSABS 0.06 09/10/2020        BMP/CMP   Lab Results   Component Value Date    GLUCOSE 142 09/10/2020    CREATININE 0.54 09/10/2020    BUN 9 09/10/2020     09/10/2020    K 4.2 09/10/2020     09/10/2020    CO2 20 09/10/2020    CALCIUM 10.2 09/10/2020    AST 50 09/10/2020    ALKPHOS 73 09/10/2020    PROT 7.5 09/10/2020    LABALBU 4.3 09/10/2020    BILITOT 0.39 09/10/2020    ALT 48 09/10/2020        PREALBUMIN   No results found for: PREALBUMIN     VITAMIN B12   No results found for: [id number]     VITAMIN D   No results found for: VITD25     PTH  No results found for: IPTH    VITAMIN B1/ THIAMINE   No results found for: [id number]     LIPID SCREEN (FASTING)   Lab Results   Component Value Date    HDL 36 09/10/2020   ,     HGA1C (T2DM ONLY)   No results found for: LABA1C, AVGG     TSH   Lab Results   Component Value Date    TSH 2.31 09/10/2020        IRON   No results found for: IRON     TIBC  No results found for: TIBC    FERRITIN  No results found for: FERRITIN    VITAMIN A  No results found for: RETINOL    NICOTINE  No results found for: NMET    UDS  No results found for: UDP    PSA  No results found for: LABPSA    GFR  Lab Results   Component Value Date    LABGLOM >60 09/10/2020       DEXA  No results found for this or any previous visit. Assessment:       Diagnosis Orders   1. BMI 45.0-49.9, adult ([facility] [address]     2. Depression, unspecified depression type     3. Anxiety     4. Fatty liver disease, nonalcoholic     5. Hypothyroidism, unspecified type     6. Hypertension, unspecified type     7. Diabetes mellitus type 2 in obese ([facility] [address]     8. Vitamin D deficiency     9. DIANNE on CPAP     10. Chronic low back pain, unspecified back pain laterality, unspecified whether sciatica present     11. Chronic pain of both knees         Plan:    · Behavior modification discussed in detail in regards to dietary habits. · Nutritional education occurred during visit. Continue following recommendations  per dietitian. · Improvement in fitness/exercise discussed with patient and the need for this  with/without surgery. · Schedule orientation with Tan Greer Exercise Physiologist, at the fitness  center. · Cardiac Clearance needed prior to surgery prn  · Pulmonary Clearance needed prior to surgery- [facility]. Patient to call asap to set up apt. · Psychology evaluation with Dr. Dre Ornelas 9/29/21 and 10/21/21  · Continue water therapy as scheduled   · EGD prior to any surgical intervention- will send referral  · Encouraged to attend support groups. Completed x 1.  · Goal to lose 3-5% TBW prior to surgery. · Seca scale completed and reviewed  · Initial labs completed and reviewed  · Vit D 26/ Pth 73- continue OTC Vit D3 5,0000IU daily.    · Slightly elevated liver enzymes- following fatty liver with PCP  · EKG completed- awaiting result  · Advised not to get pregnant for 18-24 months post bariatric surgery as this can increase risk of malnourishment potentially leading to low birth weight or malformation. (tubal)  · No lifting/pushing/pulling over 20# for 4 weeks post-op  · No NSAIDS 10 days prior to bariatric surgery. Avoid NSAID use post-op  · Discussed Lovenox post-op bariatric surgery  · LOMN received  ·  Mammogram and Dexa scan completed-no concerning findings. Return in about 1 month (around 10/1/2021) for Follow up. I spent over 20 minutes with the patient, with greater that 50% of that time spent on education, counseling and coordination of care.      Electronically signed by BETSY Ying on 9/1/2021 at 2:18 PM

## 2021-09-01 NOTE — PATIENT INSTRUCTIONS
· Behavior modification discussed in detail in regards to dietary habits. · Nutritional education occurred during visit. Continue following recommendations  per dietitian. · Improvement in fitness/exercise discussed with patient and the need for this  with/without surgery. · Schedule orientation with Keyon Martinez, Exercise Physiologist, at the fitness  center. · Cardiac Clearance needed prior to surgery prn  · Pulmonary Clearance needed prior to surgery- [facility]. Patient to call asap to set up apt. · Psychology evaluation with Dr. Lara Juan 9/29/21 and 10/21/21  · Continue water therapy as scheduled   · EGD prior to any surgical intervention- will send referral  · Encouraged to attend support groups. Completed x 1.  · Goal to lose 3-5% TBW prior to surgery. · Seca scale completed and reviewed  · Initial labs completed and reviewed  · Vit D 26/ Pth 73- continue OTC Vit D3 5,0000IU daily. · Slightly elevated liver enzymes- following fatty liver with PCP  · EKG completed- awaiting result  · Advised not to get pregnant for 18-24 months post bariatric surgery as this can increase risk of malnourishment potentially leading to low birth weight or malformation. (tubal)  · No lifting/pushing/pulling over 20# for 4 weeks post-op  · No NSAIDS 10 days prior to bariatric surgery. Avoid NSAID use post-op  · Discussed Lovenox post-op bariatric surgery  · LOMN received  ·  Mammogram and Dexa scan completed-no concerning findings.

## 2021-09-01 NOTE — PATIENT INSTRUCTIONS
Goals: 1. Nutrition Goal:  I will use my food journal to record meal times, serving sizes and bring back to next dietitian visit  2  Exercise Goal:  Continue outpatient water therapy twice a week or start swimming at [facility] at least twice a week. 3. Water Goal:  Great job with water intake- continue at least 64 oz or greater a day.     4.  Can continue to watch online You Tube videos for support groups

## 2021-09-16 ENCOUNTER — NURSE ONLY (OUTPATIENT)
Dept: LAB | Age: 56
End: 2021-09-16

## 2021-09-29 ENCOUNTER — OFFICE VISIT (OUTPATIENT)
Dept: PSYCHIATRY | Age: 56
End: 2021-09-29
Payer: MEDICARE

## 2021-09-29 DIAGNOSIS — E66.01 MORBID OBESITY (HCC): ICD-10-CM

## 2021-09-29 DIAGNOSIS — F39 MOOD DISORDER (HCC): Primary | ICD-10-CM

## 2021-09-29 PROCEDURE — 96131 PSYCL TST EVAL PHYS/QHP EA: CPT | Performed by: PSYCHOLOGIST

## 2021-09-29 PROCEDURE — 90791 PSYCH DIAGNOSTIC EVALUATION: CPT | Performed by: PSYCHOLOGIST

## 2021-09-29 PROCEDURE — 96130 PSYCL TST EVAL PHYS/QHP 1ST: CPT | Performed by: PSYCHOLOGIST
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
## 2021-10-21 ENCOUNTER — OFFICE VISIT (OUTPATIENT)
Dept: PSYCHIATRY | Age: 56
End: 2021-10-21
Payer: MEDICARE

## 2021-10-21 DIAGNOSIS — E66.01 MORBID OBESITY (HCC): ICD-10-CM

## 2021-10-21 DIAGNOSIS — F39 MOOD DISORDER (HCC): Primary | ICD-10-CM

## 2021-10-21 PROCEDURE — 96136 PSYCL/NRPSYC TST PHY/QHP 1ST: CPT | Performed by: PSYCHOLOGIST

## 2021-10-21 PROCEDURE — 90837 PSYTX W PT 60 MINUTES: CPT | Performed by: PSYCHOLOGIST

## 2021-10-21 NOTE — PROGRESS NOTES
Cognitive Assessment: 26/30 (+1 added for low education). Patient is essentially cognitively intact on brief screener. Psychological Interpretation of MMPI-2: Patient's MMPI-2 appears to be valid. Although none of patient's scores were statistically significantly elevated there were slight elevations on scales 2, and 1 suggesting a spike 2/1 profile. Individuals with similar clinical profiles of 1/2 often exhibit multiple somatic complaints and concern over their physical function. They may have a tendency to exaggerate their physical symptoms and often focus on pain mostly headaches, backaches and stomachaches. These individuals are typically hard-working and driven yet somewhat immature with dependency needs. They usually have significant doubt about their own abilities and struggle with low self-esteem which may be an indication of her self-perception as an obese person. They often direct their anger inward. MENTAL STATUS EXAM  General appearance: dressed appropriately, well-groomed, obese, mask/handkerchief  Attitude & Behavior: pleasant and cooperative  Motor Activity & Musculoskeletal: no abnormal movement  Speech & Language: normal rate, volume, and prosody  Gait & Station: appears intact  Mood: near content, nervous at times  Affect: congruent with mood, appropriate to setting  Thought content: appropriate  Suicidal ideation: denies  Homicidal ideation: denies  Thought process & Associations: goal-directed, circumstantial at times  Perceptions: appropriate  Orientation: to person, place, and time  Attention & Concentration: appears intact  Fund of knowledge: average  Insight: average  Judgment: average     DSM DIAGNOSIS:  Mood disorder, unspecified   Morbid obesity      PLAN:  Jani Mir has completed minimum requirements and is now cleared from a psychological/substance perspective for bariatric surgery.       Psychology Clinician:  Ansley Singh, PhD     Start time: 9:31am  End time: 10:30am    PSYCHOLOGICAL SCREENING RESULTS:      TESTING COMPLETED BY: Tanvi Maier, PHD  TIME SPENT WITH TESTIN MINUTES     TESTS PERFORMED: [facility], REALM-R, and REALM-SF      Electronically signed by Osmel Mcclure, PhD on 10/21/21 at 9:29 AM EDT

## 2021-10-22 NOTE — PROGRESS NOTES
Assessment: Patient is a 61 y.o. female seen for  Month three  follow up MNT visit for pre op bariatric desires bypass    Vitals from current and previous visits:      Vitals 79/18/3841   SYSTOLIC 990   DIASTOLIC 72   Site Right Upper Arm   Position Sitting   Cuff Size Large Adult   Pulse 84   Temp 97.2   Resp 18   Weight 297 lb 6.4 oz   Height 5' 8\"   Body mass index 45.21 kg/m2   Initial weight at start of Weight Management Program was: 317lbs     Maye lost 7 lbs over one month  -Weight goal: lose weight.     -Nutritionally relevant labs: Initial labs scanned into media section- Vit D-26.9, B1-161, elevated LFTs  Lab Results   Component Value Date/Time    GLUCOSE 142 (H) 09/10/2020 10:55 AM    HDL 36 (L) 09/10/2020 10:55 AM     Dr Mike Padron Clearance obtained  States dx'd with fatty Liver in past.- [facility] . EGD already done  - Is patient taking daily Multivitamin:  MVI daily pill form- Centrum Silver,   Taking VitD 3 now 5,000IU's daily  Taking Metamucil on regular basis for bowels    Pt and boyfriend in house. Does not work.   Goes to food pantry once a month-   Pt goes to bed around ~ midnite (lays in bed starting at 7p) and is up by 9am  States now using food journal at times specifically to keep self aware of food choices and accountability.  -Food Recall:   States been watching portion sizes closer and working on [employer]: this am had oatmeal plain from [employer] or may have two hard boiled eggs and two slices keto bread  Lunch: states lunch time ~ 11a-1pm-may make scrambled eggs with leos and toast  Dinner:  Trying to eat dinner earlier when able to to avoid laying down on full stomach- salads, or green beans and includes a protein  Snacks- strawberries between meals   Main Beverages: now only drinking 1 cup coffee occasionally two cups/day,   32 oz glass using for water and filling this up twice a day, has omitted all pop I- today reports had a couple \"here and there\"- reminded pt no pop     -Impression of Dietary Intake: lower carbohydrate intake per pt preference - Pt  is working towards avoiding high fat/high sugar foods. Pt  is working towards  including protein at meals and snacks. Exercise:    -Physical Activity is: Completed water therapy for exercise- two days a week . Walking dog around block ~ 2-3 times a week on other days chair exercises ~ 2-3 days a week. Encouraged pt to use [employer] as able to    Nutrition Diagnosis: Overweight/Obesity related to currently undergoing MNT as evidenced by BMI of 45.2    Intervention:   Healthy behaviors: consistently logging food intake and adequate fluid intake  Patient has attended You Tube Videos onilne times one and attended Zoom meeting in October  Pt engaged in program and continues to work towards nutrition behaviors recommended for bariatric surgery. Pt asking appropriate questions during office visit. Goals reviewed with pt  Patient Instructions   Goals:  1. Nutrition Goal:  Aim for regular meal times - remember order you want to eat- choose lean protein food first, followed by vegetables and fruits and then starch food last if still hungry. 2  Exercise Goal:  Continue chair exercises 2-3 times a week and continue walking dog 2-3 days a week. Use the [employer] when you can! 3. Water Goal:  Great job with water intake- continue at least 64 oz or greater a day.               -Followup visit: 4 weeks with dietitian.        Ino Sahu RD, LD, RD, LD  Dietitian- Weight Management [address] [place] [address]

## 2021-10-25 ENCOUNTER — OFFICE VISIT (OUTPATIENT)
Dept: BARIATRICS/WEIGHT MGMT | Age: 56
End: 2021-10-25

## 2021-10-25 ENCOUNTER — OFFICE VISIT (OUTPATIENT)
Dept: BARIATRICS/WEIGHT MGMT | Age: 56
End: 2021-10-25
Payer: MEDICARE

## 2021-10-25 VITALS
SYSTOLIC BLOOD PRESSURE: 120 MMHG | WEIGHT: 293 LBS | DIASTOLIC BLOOD PRESSURE: 72 MMHG | BODY MASS INDEX: 44.41 KG/M2 | TEMPERATURE: 97.2 F | HEART RATE: 84 BPM | HEIGHT: 68 IN | RESPIRATION RATE: 18 BRPM

## 2021-10-25 DIAGNOSIS — G89.29 CHRONIC PAIN OF BOTH KNEES: ICD-10-CM

## 2021-10-25 DIAGNOSIS — F41.9 ANXIETY: ICD-10-CM

## 2021-10-25 DIAGNOSIS — M54.50 CHRONIC LOW BACK PAIN, UNSPECIFIED BACK PAIN LATERALITY, UNSPECIFIED WHETHER SCIATICA PRESENT: ICD-10-CM

## 2021-10-25 DIAGNOSIS — E66.01 MORBID OBESITY WITH BMI OF 45.0-49.9, ADULT (HCC): Primary | ICD-10-CM

## 2021-10-25 DIAGNOSIS — I10 HYPERTENSION, UNSPECIFIED TYPE: ICD-10-CM

## 2021-10-25 DIAGNOSIS — M25.562 CHRONIC PAIN OF BOTH KNEES: ICD-10-CM

## 2021-10-25 DIAGNOSIS — Z99.89 OSA ON CPAP: ICD-10-CM

## 2021-10-25 DIAGNOSIS — F32.A DEPRESSION, UNSPECIFIED DEPRESSION TYPE: ICD-10-CM

## 2021-10-25 DIAGNOSIS — E11.69 DIABETES MELLITUS TYPE 2 IN OBESE (HCC): ICD-10-CM

## 2021-10-25 DIAGNOSIS — E55.9 VITAMIN D DEFICIENCY: ICD-10-CM

## 2021-10-25 DIAGNOSIS — E03.9 HYPOTHYROIDISM, UNSPECIFIED TYPE: ICD-10-CM

## 2021-10-25 DIAGNOSIS — G47.33 OSA ON CPAP: ICD-10-CM

## 2021-10-25 DIAGNOSIS — M25.561 CHRONIC PAIN OF BOTH KNEES: ICD-10-CM

## 2021-10-25 DIAGNOSIS — K76.0 FATTY LIVER DISEASE, NONALCOHOLIC: ICD-10-CM

## 2021-10-25 DIAGNOSIS — G89.29 CHRONIC LOW BACK PAIN, UNSPECIFIED BACK PAIN LATERALITY, UNSPECIFIED WHETHER SCIATICA PRESENT: ICD-10-CM

## 2021-10-25 DIAGNOSIS — E66.9 DIABETES MELLITUS TYPE 2 IN OBESE (HCC): ICD-10-CM

## 2021-10-25 PROCEDURE — 99213 OFFICE O/P EST LOW 20 MIN: CPT | Performed by: PHYSICIAN ASSISTANT

## 2021-10-25 NOTE — PATIENT INSTRUCTIONS
· Behavior modification discussed in detail in regards to dietary habits. · Nutritional education occurred during visit. Continue following recommendations  per dietitian. · Improvement in fitness/exercise discussed with patient and the need for this  with/without surgery. · Pulmonary Clearance needed prior to surgery- [facility] 9/16/21. · Psychology evaluation with Dr. Madeline Beebe completed and reviewed. · EGD Completed and reviewed. H. Pylori (-)  · Encouraged to attend support groups. Completed x 1.  · Goal to lose 3-5% TBW prior to surgery. Currently down 20#  · Seca scale completed and reviewed  · Initial labs completed and reviewed  · Vit D 26/ Pth 73- continue OTC Vit D3 5,0000IU daily. · Slightly elevated liver enzymes- following fatty liver with PCP  · EKG completed and reviewed. NSR/ Nml EKG  · Advised not to get pregnant for 18-24 months post bariatric surgery as this can increase risk of malnourishment potentially leading to low birth weight or malformation. (tubal)  · No lifting/pushing/pulling over 20# for 4 weeks post-op  · No NSAIDS 10 days prior to bariatric surgery. Avoid NSAID use post-op  · Discussed Lovenox post-op bariatric surgery  · LOMN received  ·  Mammogram and Dexa scan completed-no concerning findings.

## 2021-10-25 NOTE — PATIENT INSTRUCTIONS
Goals: 1. Nutrition Goal:  Aim for regular meal times - remember order you want to eat- choose lean protein food first, followed by vegetables and fruits and then starch food last if still hungry. 2  Exercise Goal:  Continue chair exercises 2-3 times a week and continue walking dog 2-3 days a week. Use the [employer] when you can! 3.  Water Goal:  Great job with water intake- continue at least 64 oz or greater a day.

## 2021-10-25 NOTE — PROGRESS NOTES
[facility]  [address]  [place], [facility]  [phone]      Visit Date:  10/25/2021  Weight Management Pre-Op Follow-up    HPI:    Medically Supervised follow-up- Month #3 of 3/6- desires RYGB    Fe Fam is here today for continued supervised weight management of morbid obesity. Weight today 297#. Down 7# since last month. Down 20# since starting with weight management. BMI 45. Reports that she continues to make improvements to nutrition. Eating 2-3 meals daily. Snacking at times. Mindful of food choices. Improving on portion control- but still a work in progress. Continues to drink around 64oz of water daily. Drinking 1-2 cups of coffee daily. Rarely drinking pop- twice throughout the month when eating out. Comorbid conditions include sleep apnea tx with CPAP, depression, anxiety, PCOS, fatty liver, GERD, IBS, hypothyroid, hypertension, diabetes mellitus, hyperlipidemia, chronic back and knee pain. Monitoring blood sugars daily and running <130. LOMN received. Completed support groups x 1. EKG completed and reviewed. EGD with dilatation completed. Gastritis. Started on Omeprazole x 30 days. H. Pylori (-). Continues CPAP 4-6 hours per night- Pulmonary clearance needed prior to surgery. Has a follow up scheduled with Landy Nichols for PFT 9/9/21 and follow up  9/16/21. Psychological clearance with Dr. Gertrudis Castillo completed and reviewed. Mammogram and Dexa scan completed- no concerning findings. Continues OTC Vit D for an initial level of 26. If she does not lose enough weight with medical management she would like to proceed with RYGB. Physical Activity: chair exercises 2-3 time weekly / walking 2-3 times weekly. Trying to get in dedicated activity 5-6 times per week. Current BMI: Body mass index is 45.22 kg/m².   Current Weight:   Wt Readings from Last 3 Encounters:   10/25/21 297 lb 6.4 oz (134.9 kg)   09/01/21 (!) 304 lb (137.9 kg) 21 (!) 308 lb 3.2 oz (139.8 kg)     Initial Body Weight:317    Past Medical History:  Past Medical History:   Diagnosis Date    Depression     GERD (gastroesophageal reflux disease)     Hypertension     Hypothyroidism     Irritable bowel syndrome     Osteoarthritis     Type II or unspecified type diabetes mellitus without mention of complication, not stated as uncontrolled        Past Surgical History:  Past Surgical History:   Procedure Laterality Date    CHOLECYSTECTOMY      COLONOSCOPY  longer than 3 yr ago    [facility]    KNEE ARTHROSCOPY Left     x2    OTHER SURGICAL HISTORY      knee ablation     TUBAL LIGATION         Past Social History:  Social History     Socioeconomic History    Marital status: Single     Spouse name: Not on file    Number of children: Not on file    Years of education: Not on file    Highest education level: Not on file   Occupational History    Not on file   Tobacco Use    Smoking status: Former Smoker     Packs/day: 2.00     Types: Cigarettes     Quit date: 4/15/1991     Years since quittin.5    Smokeless tobacco: Never Used   Substance and Sexual Activity    Alcohol use: Not Currently    Drug use: No    Sexual activity: Not on file   Other Topics Concern    Not on file   Social History Narrative    Not on file     Social Determinants of Health     Financial Resource Strain:     Difficulty of Paying Living Expenses:    Food Insecurity:     Worried About Running Out of Food in the Last Year:     Claudia of Food in the Last Year:    Transportation Needs:     Lack of Transportation (Medical):      Lack of Transportation (Non-Medical):    Physical Activity:     Days of Exercise per Week:     Minutes of Exercise per Session:    Stress:     Feeling of Stress :    Social Connections:     Frequency of Communication with Friends and Family:     Frequency of Social Gatherings with Friends and Family:     Attends Protestant Services:  Active Member of Clubs or Organizations:     Attends Club or Organization Meetings:     Marital Status:    Intimate Partner Violence:     Fear of Current or Ex-Partner:     Emotionally Abused:     Physically Abused:     Sexually Abused:         Medications:   Current Outpatient Medications   Medication Sig Dispense Refill    SITagliptin (JANUVIA) 100 MG tablet Take 100 mg by mouth daily      losartan (COZAAR) 100 MG tablet Take 100 mg by mouth daily      Daily Multiple Vitamins TABS Take by mouth      Insulin Glargine (TOUJEO SOLOSTAR SC) Inject 160 Units into the skin daily      gabapentin (NEURONTIN) 600 MG tablet Take 600 mg by mouth 3 times daily.  Cholecalciferol (VITAMIN D) 125 MCG (5000 UT) CAPS Take 4,000 Units by mouth daily       atorvastatin (LIPITOR) 40 MG tablet Take 40 mg by mouth daily      hydroCHLOROthiazide (HYDRODIURIL) 50 MG tablet       cloNIDine (CATAPRES) 0.1 MG tablet Take 0.1 mg by mouth 2 times daily      DICLOFENAC PO Take 50 mg by mouth 2 times daily       Levothyroxine Sodium 137 MCG CAPS Take 137 mcg by mouth Daily       Citalopram Hydrobromide (CELEXA PO) Take 20 mg by mouth daily       CarBAMazepine (TEGRETOL PO) Take 200 mg by mouth 2 times daily       AMLODIPINE BESYLATE PO Take  by mouth daily.  aspirin 81 MG tablet Take 81 mg by mouth daily. No current facility-administered medications for this visit. Allergies: Allergies   Allergen Reactions    Ampicillin Hives    Motrin [Ibuprofen] Hives       Subjective:    Review of Systems:  Constitutional: Denies any fever, chills,(+) fatigue. Wound: Denies any rash, skin color changes or wound problems. Resp: Denies any cough, (+)shortness of breath. CV: Denies any chest pain, orthopnea or syncope. MS: Denies myalgias,(+) arthralgias- back/knees  GI: (+) intermittent nausea with medications. Denies  vomiting, (+)diarrhea/ constipation, melena, hematochezia.    : Denies any hematuria, hesitancy or dysuria. NEURO: Denies seizures, (+) headache. Objective:    /72 (Site: Right Upper Arm, Position: Sitting, Cuff Size: Large Adult)   Pulse 84   Temp 97.2 °F (36.2 °C) (Infrared)   Resp 18   Ht 5' 8\" (1.727 m)   Wt 297 lb 6.4 oz (134.9 kg)   BMI 45.22 kg/m²   Physical Examination:   Constitutional: Alert and oriented to person, place and time. Well-developed, well- nourished. Head: Normocephalic and atraumatic  Neck: Supple. Eyes: EOMI b/l. Conjunctivae normal.  No scleral icterus. Respiratory: Effort normal. No respiratory distress. Abd: Benign  Ext:  Movement x 4. No edema  Skin; Warm and dry, no visible rashes, lesions or ulcers.    Neuro: Cranial Nerves Grossly Intact; nml coordination    CBC   Lab Results   Component Value Date    WBC 7.7 09/10/2020    RBC 4.41 09/10/2020    HGB 13.1 09/10/2020    HCT 41.4 09/10/2020    MCV 93.9 09/10/2020    MCH 29.7 09/10/2020    MCHC 31.6 09/10/2020    RDW 13.8 09/10/2020     09/10/2020    MPV 11.8 09/10/2020    RBCMORP NOT REPORTED 09/10/2020    MONOPCT 7 09/10/2020    LYMPHSABS 2.42 09/10/2020    MONOSABS 0.50 09/10/2020    EOSABS 0.67 09/10/2020    BASOSABS 0.06 09/10/2020        BMP/CMP   Lab Results   Component Value Date    GLUCOSE 142 09/10/2020    CREATININE 0.54 09/10/2020    BUN 9 09/10/2020     09/10/2020    K 4.2 09/10/2020     09/10/2020    CO2 20 09/10/2020    CALCIUM 10.2 09/10/2020    AST 50 09/10/2020    ALKPHOS 73 09/10/2020    PROT 7.5 09/10/2020    LABALBU 4.3 09/10/2020    BILITOT 0.39 09/10/2020    ALT 48 09/10/2020        PREALBUMIN   No results found for: PREALBUMIN     VITAMIN B12   No results found for: [id number]     VITAMIN D   No results found for: VITD25     PTH  No results found for: IPTH    VITAMIN B1/ THIAMINE   No results found for: [id number]     LIPID SCREEN (FASTING)   Lab Results   Component Value Date    HDL 36 09/10/2020   ,     HGA1C (T2DM ONLY)   No results found for: LABA1C, AVGG     TSH   Lab Results   Component Value Date    TSH 2.31 09/10/2020        IRON   No results found for: IRON     TIBC  No results found for: TIBC    FERRITIN  No results found for: FERRITIN    VITAMIN A  No results found for: RETINOL    NICOTINE  No results found for: NMET    UDS  No results found for: UDP    PSA  No results found for: LABPSA    GFR  Lab Results   Component Value Date    LABGLOM >60 09/10/2020       DEXA  No results found for this or any previous visit. Assessment:       Diagnosis Orders   1. BMI 45.0-49.9, adult ([facility] [address]     2. Depression, unspecified depression type     3. Anxiety     4. Fatty liver disease, nonalcoholic     5. Hypothyroidism, unspecified type     6. Hypertension, unspecified type     7. Diabetes mellitus type 2 in obese ([facility] [address]     8. Vitamin D deficiency     9. DIANNE on CPAP     10. Chronic low back pain, unspecified back pain laterality, unspecified whether sciatica present     11. Chronic pain of both knees         Plan:    · Behavior modification discussed in detail in regards to dietary habits. · Nutritional education occurred during visit. Continue following recommendations  per dietitian. · Improvement in fitness/exercise discussed with patient and the need for this  with/without surgery. · Pulmonary Clearance needed prior to surgery- [facility] 9/16/21. · Psychology evaluation with Dr. Esteban Molina completed and reviewed. · EGD Completed and reviewed. H. Pylori (-)  · Encouraged to attend support groups. Completed x 1.  · Goal to lose 3-5% TBW prior to surgery. Currently down 20#  · Seca scale completed and reviewed  · Initial labs completed and reviewed  · Vit D 26/ Pth 73- continue OTC Vit D3 5,0000IU daily. · Slightly elevated liver enzymes- following fatty liver with PCP  · EKG completed and reviewed.  NSR/ Nml EKG  · Advised not to get pregnant for 18-24 months post bariatric surgery as this can increase risk of malnourishment potentially leading to low birth weight or malformation. (tubal)  · No lifting/pushing/pulling over 20# for 4 weeks post-op  · No NSAIDS 10 days prior to bariatric surgery. Avoid NSAID use post-op  · Discussed Lovenox post-op bariatric surgery  · LOMN received  ·  Mammogram and Dexa scan completed-no concerning findings. Return in about 1 month (around 11/25/2021) for Follow up. I spent over 20 minutes with the patient, with greater that 50% of that time spent on education, counseling and coordination of care.      Electronically signed by BETSY Escobar on 10/25/2021 at 10:34 AM

## 2021-11-15 NOTE — PROGRESS NOTES
Assessment: Patient is a 61 y.o. female seen for  Month four  follow up MNT visit for pre op bariatric desires bypass    Vitals from current and previous visits:  Vitals 11/87/7734   SYSTOLIC 996   DIASTOLIC 64   Site Right Upper Arm   Position Sitting   Cuff Size Large Adult   Pulse 72   Temp 97.5   Resp    Weight 297 lb   Height 5' 8\"   Body mass index 45.15 kg/m2       Initial weight at start of Weight Management Program was: 317lbs     Maye weight is stable over one month  -Weight goal: lose weight.     -Nutritionally relevant labs: Initial labs scanned into media section- Vit D-26.9, B1-161, elevated LFTs  Lab Results   Component Value Date/Time    GLUCOSE 142 (H) 09/10/2020 10:55 AM    HDL 36 (L) 09/10/2020 10:55 AM     Dr Roxi Randolph Clearance obtained  States dx'd with fatty Liver in past.- [facility] . EGD already done (-) H Pylori  - Is patient taking daily Multivitamin:  MVI daily pill form- Centrum Silver,   Taking VitD 3 5,000IU's daily- pt to have Vitamin D and PTH labs repeated- orders given today  Taking Metamucil on regular basis for bowels  Awaiting Pulmonary Clerance    Pt and boyfriend in house. Does not work. Goes to food pantry once a month-   Pt goes to bed around ~ midnite (lays in bed starting at 7p) and is up by 9am    -Food Recall:   Pt continues to work on aiming for regular meal times  Breakfast: 9am-  Having yogurt or oatmeal or may have eggs and leos and keto toast  Lunch: states lunch time ~ 11a-1pm--today had a [employer] Protein Shake or has a sandwich or couple hard boiled eggs  Dinner:  Between 5-6pm- avoiding eating later.   States eating out dinner at times but lately less eating out- last nite had chicken tenders  Snacks- overall less snacking  Main Beverages: now only drinking 1 cup coffee per day in morning,   Got a new cup ~ 24 oz and states needs to drink more water overall- strongly encouraged pt to consistently get 64 oz water a day,  Has had two pops in the last month    -Impression of Dietary Intake: l- Pt  is working towards avoiding high fat/high sugar foods. Pt  is working towards  including protein at meals and snacks. Exercise:    -Physical Activity is: Completed water therapy for exercise- two days a week . Walking dog around block ~ 2-3 times a week on other days chair exercises ~ 2-3 days a week. Has not been getting to [facility]    Nutrition Diagnosis: Overweight/Obesity related to currently undergoing MNT as evidenced by BMI of 45.1    Intervention:   Healthy behaviors:   Patient has attended You Tube Videos onilne times one and attended [facility] in October and November. Pt engaged in program and continues to work towards nutrition behaviors recommended for bariatric surgery. Encouraged to eliminate all pop and get back to drinking more water. Goals reviewed with pt  Patient Instructions   Goals:  1. Nutrition Goal:  Aim for regular meal times - remember order you want to eat- choose lean protein food first, followed by vegetables and fruits and then starch food last if still hungry. 2  Exercise Goal:  Continue chair exercises 2-3 times a week and continue walking dog 2-3 days a week. Use the [employer] when you can! 3.  Water Goal: Makes sure every day have at least 64 oz of water , no pop at all!           -Followup visit: 4 weeks with dietitian as awaits for pulmonary clearance      Janette Tatum RD, LD, RD, LD  Dietitian- Weight Management [address] [place] [address]

## 2021-11-17 ENCOUNTER — OFFICE VISIT (OUTPATIENT)
Dept: BARIATRICS/WEIGHT MGMT | Age: 56
End: 2021-11-17
Payer: MEDICARE

## 2021-11-17 ENCOUNTER — OFFICE VISIT (OUTPATIENT)
Dept: BARIATRICS/WEIGHT MGMT | Age: 56
End: 2021-11-17

## 2021-11-17 VITALS
BODY MASS INDEX: 44.41 KG/M2 | HEART RATE: 72 BPM | DIASTOLIC BLOOD PRESSURE: 64 MMHG | TEMPERATURE: 97.5 F | SYSTOLIC BLOOD PRESSURE: 116 MMHG | HEIGHT: 68 IN | WEIGHT: 293 LBS

## 2021-11-17 DIAGNOSIS — M25.561 CHRONIC PAIN OF BOTH KNEES: ICD-10-CM

## 2021-11-17 DIAGNOSIS — M54.50 CHRONIC LOW BACK PAIN, UNSPECIFIED BACK PAIN LATERALITY, UNSPECIFIED WHETHER SCIATICA PRESENT: ICD-10-CM

## 2021-11-17 DIAGNOSIS — E55.9 VITAMIN D DEFICIENCY: ICD-10-CM

## 2021-11-17 DIAGNOSIS — F41.9 ANXIETY: ICD-10-CM

## 2021-11-17 DIAGNOSIS — K76.0 FATTY LIVER DISEASE, NONALCOHOLIC: ICD-10-CM

## 2021-11-17 DIAGNOSIS — E66.9 DIABETES MELLITUS TYPE 2 IN OBESE (HCC): ICD-10-CM

## 2021-11-17 DIAGNOSIS — G89.29 CHRONIC LOW BACK PAIN, UNSPECIFIED BACK PAIN LATERALITY, UNSPECIFIED WHETHER SCIATICA PRESENT: ICD-10-CM

## 2021-11-17 DIAGNOSIS — F32.A DEPRESSION, UNSPECIFIED DEPRESSION TYPE: ICD-10-CM

## 2021-11-17 DIAGNOSIS — Z99.89 OSA ON CPAP: ICD-10-CM

## 2021-11-17 DIAGNOSIS — E03.9 HYPOTHYROIDISM, UNSPECIFIED TYPE: ICD-10-CM

## 2021-11-17 DIAGNOSIS — G89.29 CHRONIC PAIN OF BOTH KNEES: ICD-10-CM

## 2021-11-17 DIAGNOSIS — I10 HYPERTENSION, UNSPECIFIED TYPE: ICD-10-CM

## 2021-11-17 DIAGNOSIS — E11.69 DIABETES MELLITUS TYPE 2 IN OBESE (HCC): ICD-10-CM

## 2021-11-17 DIAGNOSIS — E66.01 MORBID OBESITY WITH BMI OF 45.0-49.9, ADULT (HCC): Primary | ICD-10-CM

## 2021-11-17 DIAGNOSIS — M25.562 CHRONIC PAIN OF BOTH KNEES: ICD-10-CM

## 2021-11-17 DIAGNOSIS — G47.33 OSA ON CPAP: ICD-10-CM

## 2021-11-17 PROCEDURE — 99213 OFFICE O/P EST LOW 20 MIN: CPT | Performed by: PHYSICIAN ASSISTANT

## 2021-11-17 NOTE — PROGRESS NOTES
[facility]  [address]  [place], [address] Located within [facility]  [phone]      Visit Date:  11/17/2021  Weight Management Pre-Op Follow-up    HPI:    Medically Supervised follow-up- Month #4 of 3/6- desires RYGB    Aditya Scott is here today for continued supervised weight management of morbid obesity. Weight today 297#. Weight stable since last month. Down 20# since starting with weight management. BMI 45. Reports that she has had a stressful month. Several personal issues over the month. Has been fairly consistent with nutrition over the month. A few bad days- but overall has done well. Has not been tracking food intake. Has been eating 2-3 meals daily. Feels that she is doing better with portion control. Has been able to decrease portions over the month. Eating more vegetables. Has not been consistent with water intake over the month. Reports that she has been very busy this month with appointments and has found that if she does not bring her water with her she is not getting it in. Has drank 2 cans of pop over the month. Advised to stop. Drinking 1 cup of coffee daily. Comorbid conditions include sleep apnea tx with CPAP, depression, anxiety, PCOS, fatty liver, GERD, IBS, hypothyroid, hypertension, diabetes mellitus, hyperlipidemia, chronic back and knee pain. Continues to monitor blood sugars daily ad running . LOMN received. Completed support groups x 2. EKG completed and reviewed. EGD with dilatation completed. Gastritis. Started on Omeprazole x 30 days. H. Pylori (-). Continues CPAP 4-6 hours per night- Pulmonary clearance needed prior to surgery. Completed CXR/ PFT- awaiting results. Methacholine challenge scheduled for 12/7/21. Awaiting pulmonary clearance- apt scheduled 1/18/22. Psychological clearance with Dr. Jassi Doyle completed and reviewed. Mammogram and Dexa scan completed- no concerning findings. Continues OTC Vit D for an initial level of 26.   Will give order to repeat level. If she does not lose enough weight with medical management she would like to proceed with RYGB. Physical Activity: chair exercises/ walking dog 4-5 times per week. Current BMI: Body mass index is 45.16 kg/m².   Current Weight:   Wt Readings from Last 3 Encounters:   21 297 lb (134.7 kg)   10/25/21 297 lb 6.4 oz (134.9 kg)   21 (!) 304 lb (137.9 kg)     Initial Body Weight:317    Past Medical History:  Past Medical History:   Diagnosis Date    Depression     GERD (gastroesophageal reflux disease)     Hypertension     Hypothyroidism     Irritable bowel syndrome     Osteoarthritis     Type II or unspecified type diabetes mellitus without mention of complication, not stated as uncontrolled        Past Surgical History:  Past Surgical History:   Procedure Laterality Date    CHOLECYSTECTOMY      COLONOSCOPY  longer than 3 yr ago    [facility]    KNEE ARTHROSCOPY Left     x2    OTHER SURGICAL HISTORY      knee ablation     TUBAL LIGATION         Past Social History:  Social History     Socioeconomic History    Marital status: Single     Spouse name: Not on file    Number of children: Not on file    Years of education: Not on file    Highest education level: Not on file   Occupational History    Not on file   Tobacco Use    Smoking status: Former Smoker     Packs/day: 2.00     Types: Cigarettes     Quit date: 4/15/1991     Years since quittin.6    Smokeless tobacco: Never Used   Substance and Sexual Activity    Alcohol use: Not Currently    Drug use: No    Sexual activity: Not on file   Other Topics Concern    Not on file   Social History Narrative    Not on file     Social Determinants of Health     Financial Resource Strain:     Difficulty of Paying Living Expenses: Not on file   Food Insecurity:     Worried About Running Out of Food in the Last Year: Not on file    Claudia of Food in the Last Year: Not on file Transportation Needs:     Lack of Transportation (Medical): Not on file    Lack of Transportation (Non-Medical): Not on file   Physical Activity:     Days of Exercise per Week: Not on file    Minutes of Exercise per Session: Not on file   Stress:     Feeling of Stress : Not on file   Social Connections:     Frequency of Communication with Friends and Family: Not on file    Frequency of Social Gatherings with Friends and Family: Not on file    Attends Buddhism Services: Not on file    Active Member of [employer] or Organizations: Not on file    Attends Club or Organization Meetings: Not on file    Marital Status: Not on file   Intimate Partner Violence:     Fear of Current or Ex-Partner: Not on file    Emotionally Abused: Not on file    Physically Abused: Not on file    Sexually Abused: Not on file   Housing Stability:     Unable to Pay for Housing in the Last Year: Not on file    Number of [place] in the Last Year: Not on file    Unstable Housing in the Last Year: Not on file        Medications:   Current Outpatient Medications   Medication Sig Dispense Refill    SITagliptin (JANUVIA) 100 MG tablet Take 100 mg by mouth daily      losartan (COZAAR) 100 MG tablet Take 100 mg by mouth daily      Daily Multiple Vitamins TABS Take by mouth      Insulin Glargine (TOUJEO SOLOSTAR SC) Inject 160 Units into the skin daily      gabapentin (NEURONTIN) 600 MG tablet Take 600 mg by mouth 3 times daily.        Cholecalciferol (VITAMIN D) 125 MCG (5000 UT) CAPS Take 4,000 Units by mouth daily       atorvastatin (LIPITOR) 40 MG tablet Take 40 mg by mouth daily      hydroCHLOROthiazide (HYDRODIURIL) 50 MG tablet       cloNIDine (CATAPRES) 0.1 MG tablet Take 0.1 mg by mouth 2 times daily      DICLOFENAC PO Take 50 mg by mouth 2 times daily       Levothyroxine Sodium 137 MCG CAPS Take 137 mcg by mouth Daily       Citalopram Hydrobromide (CELEXA PO) Take 20 mg by mouth daily       CarBAMazepine (TEGRETOL PO) Take 200 mg by mouth 2 times daily       AMLODIPINE BESYLATE PO Take  by mouth daily.  aspirin 81 MG tablet Take 81 mg by mouth daily. No current facility-administered medications for this visit. Allergies: Allergies   Allergen Reactions    Ampicillin Hives    Motrin [Ibuprofen] Hives       Subjective:    Review of Systems:  Constitutional: Denies any fever, chills,(+) fatigue. Wound: Denies any rash, skin color changes or wound problems. Resp: Denies any cough, (+)shortness of breath. CV: Denies any chest pain, orthopnea or syncope. MS: Denies myalgias,(+) arthralgias- back/knees  GI: (+) intermittent nausea with medications. Denies  vomiting, (+)diarrhea/ constipation, melena, hematochezia. : Denies any hematuria, hesitancy or dysuria. NEURO: Denies seizures, (+) headache. Objective:    /64 (Site: Right Upper Arm, Position: Sitting, Cuff Size: Large Adult)   Pulse 72   Temp 97.5 °F (36.4 °C) (Infrared)   Ht 5' 8\" (1.727 m)   Wt 297 lb (134.7 kg)   BMI 45.16 kg/m²   Physical Examination:   Constitutional: Alert and oriented to person, place and time. Well-developed, well- nourished. Head: Normocephalic and atraumatic  Neck: Supple. Eyes: EOMI b/l. Conjunctivae normal.  No scleral icterus. Respiratory: Effort normal. No respiratory distress. Abd: Benign  Ext:  Movement x 4. No edema  Skin; Warm and dry, no visible rashes, lesions or ulcers.    Neuro: Cranial Nerves Grossly Intact; nml coordination        CBC   Lab Results   Component Value Date    WBC 7.7 09/10/2020    RBC 4.41 09/10/2020    HGB 13.1 09/10/2020    HCT 41.4 09/10/2020    MCV 93.9 09/10/2020    MCH 29.7 09/10/2020    MCHC 31.6 09/10/2020    RDW 13.8 09/10/2020     09/10/2020    MPV 11.8 09/10/2020    RBCMORP NOT REPORTED 09/10/2020    MONOPCT 7 09/10/2020    LYMPHSABS 2.42 09/10/2020    MONOSABS 0.50 09/10/2020    EOSABS 0.67 09/10/2020    BASOSABS 0.06 09/10/2020        BMP/CMP Lab Results   Component Value Date    GLUCOSE 142 09/10/2020    CREATININE 0.54 09/10/2020    BUN 9 09/10/2020     09/10/2020    K 4.2 09/10/2020     09/10/2020    CO2 20 09/10/2020    CALCIUM 10.2 09/10/2020    AST 50 09/10/2020    ALKPHOS 73 09/10/2020    PROT 7.5 09/10/2020    LABALBU 4.3 09/10/2020    BILITOT 0.39 09/10/2020    ALT 48 09/10/2020        PREALBUMIN   No results found for: PREALBUMIN     VITAMIN B12   No results found for: [id number]     VITAMIN D   No results found for: VITD25     PTH  No results found for: IPTH    VITAMIN B1/ THIAMINE   No results found for: [id number]     LIPID SCREEN (FASTING)   Lab Results   Component Value Date    HDL 36 09/10/2020   ,     HGA1C (T2DM ONLY)   No results found for: LABA1C, AVGG     TSH   Lab Results   Component Value Date    TSH 2.31 09/10/2020        IRON   No results found for: IRON     TIBC  No results found for: TIBC    FERRITIN  No results found for: FERRITIN    VITAMIN A  No results found for: RETINOL    NICOTINE  No results found for: NMET    UDS  No results found for: UDP    PSA  No results found for: LABPSA    GFR  Lab Results   Component Value Date    LABGLOM >60 09/10/2020       DEXA  No results found for this or any previous visit. Assessment:       Diagnosis Orders   1. BMI 45.0-49.9, adult [facility]     2. Depression, unspecified depression type     3. Anxiety     4. Fatty liver disease, nonalcoholic     5. Hypothyroidism, unspecified type     6. Hypertension, unspecified type     7. Diabetes mellitus type 2 in obese ([facility]     8. Vitamin D deficiency  Vitamin D 25 Hydroxy    PTH, Intact   9. DIANNE on CPAP     10. Chronic low back pain, unspecified back pain laterality, unspecified whether sciatica present     11. Chronic pain of both knees         Plan:    · Behavior modification discussed in detail in regards to dietary habits. · Nutritional education occurred during visit.  Continue following recommendations  per dietitian. · Improvement in fitness/exercise discussed with patient and the need for this  with/without surgery. · Pulmonary Clearance needed prior to surgery- Awaiting results of PFT. Methacholine challenge scheduled for 12/7/21. Pulmonary clearance scheduled 1/18/22. · Psychology evaluation with Dr. Marcelene Libman completed and reviewed. · EGD Completed and reviewed. H. Pylori (-)  · Encouraged to attend support groups. Completed x 2  · Goal to lose 3-5% TBW prior to surgery. Currently down 20#  · Seca scale completed and reviewed  · Initial labs completed and reviewed  · Vit D 26/ Pth 73- continue OTC Vit D3 5,0000IU daily. Order given to repeat levels. · Slightly elevated liver enzymes- following fatty liver with PCP  · EKG completed and reviewed. NSR/ Nml EKG  · Advised not to get pregnant for 18-24 months post bariatric surgery as this can increase risk of malnourishment potentially leading to low birth weight or malformation. (tubal)  · No lifting/pushing/pulling over 20# for 4 weeks post-op  · No NSAIDS 10 days prior to bariatric surgery. Avoid NSAID use post-op  · Discussed Lovenox post-op bariatric surgery  · LOMN received  ·  Mammogram and Dexa scan completed-no concerning findings. · Avoid pop. · Increase water to 64oz daily. Return in about 1 month (around 12/17/2021) for Follow up. I spent over 20 minutes with the patient, with greater that 50% of that time spent on education, counseling and coordination of care.      Electronically signed by BETSY Mccartney on 11/17/2021 at 2:04 PM

## 2021-11-17 NOTE — PATIENT INSTRUCTIONS
· Behavior modification discussed in detail in regards to dietary habits. · Nutritional education occurred during visit. Continue following recommendations  per dietitian. · Improvement in fitness/exercise discussed with patient and the need for this  with/without surgery. · Pulmonary Clearance needed prior to surgery- Awaiting results of PFT. Methacholine challenge scheduled for 12/7/21. Pulmonary clearance scheduled in January, . · Psychology evaluation with Dr. Marcelene Libman completed and reviewed. · EGD Completed and reviewed. H. Pylori (-)  · Encouraged to attend support groups. Completed x 2  · Goal to lose 3-5% TBW prior to surgery. Currently down 20#  · Seca scale completed and reviewed  · Initial labs completed and reviewed  · Vit D 26/ Pth 73- continue OTC Vit D3 5,0000IU daily. Order given to repeat levels. · Slightly elevated liver enzymes- following fatty liver with PCP  · EKG completed and reviewed. NSR/ Nml EKG  · Advised not to get pregnant for 18-24 months post bariatric surgery as this can increase risk of malnourishment potentially leading to low birth weight or malformation. (tubal)  · No lifting/pushing/pulling over 20# for 4 weeks post-op  · No NSAIDS 10 days prior to bariatric surgery. Avoid NSAID use post-op  · Discussed Lovenox post-op bariatric surgery  · LOMN received  ·  Mammogram and Dexa scan completed-no concerning findings.

## 2021-11-17 NOTE — PATIENT INSTRUCTIONS
Goals: 1. Nutrition Goal:  Aim for regular meal times - remember order you want to eat- choose lean protein food first, followed by vegetables and fruits and then starch food last if still hungry. 2  Exercise Goal:  Continue chair exercises 2-3 times a week and continue walking dog 2-3 days a week. Use the [employer] when you can! 3. Water Goal: Makes sure every day have at least 64 oz of water , no pop at all!

## 2021-12-14 NOTE — PROGRESS NOTES
Assessment: Patient is a 61 y.o. female seen for  Month five  follow up MNT visit for pre op bariatric desires bypass    Vitals from current and previous visits:    Vitals 78/29/2990   SYSTOLIC 498   DIASTOLIC 60   Site Right Lower Arm   Position Sitting   Cuff Size Large Adult   Pulse 68   Temp 97.5   Resp    Weight 291 lb   Height 5' 8\"   Body mass index 44.24 kg/m2     Initial weight at start of Weight Management Program was: 317 lbs     Maye lost 6 lbs over one month  -Weight goal: lose weight.     -Nutritionally relevant labs: Initial labs scanned into media section- Vit D-26.9, B1-161, elevated LFTs  Lab Results   Component Value Date/Time    GLUCOSE 142 (H) 09/10/2020 10:55 AM    HDL 36 (L) 09/10/2020 10:55 AM     Dr Vel Santos Clearance obtained  States dx'd with fatty Liver in past.- [facility] follow [facility] . EGD already done (-) H Pylori  - Is patient taking daily Multivitamin:  MVI daily pill form- Centrum Silver,   Taking VitD 3 5,000IU's daily- repeat Vitamin D and PTH WNL- vitamin D now 38.4  Taking Metamucil on regular basis for bowels  Obtained Pulmonary clearance    Pt and boyfriend in house. Does not work. Goes to food pantry once a month-   Pt goes to bed around ~ midnite (lays in bed starting at 7p) and is up by 9am    -Food Recall:     Breakfast: 9am-  Having yogurt or oatmeal or may have eggs and leos and keto toast, Protein shake premier cafe latte  Lunch: states lunch time noon ~breakfast food items for lunch or a sandwich  Dinner:  Between 5-6pm- hamburger; chicken; steak; salad with olive garden Last dressing; green beans; Snacks- occasionally eats strawberriesin the afternoon around 2: or at  night   Main Beverages: Pt stated she drank more coffee this week due to travel,   Doing good with water - getting 64 oz a day,  Had 1 pop over the weekend     -Impression of Dietary Intake: l- Pt  is working towards avoiding high fat/high sugar foods.   Pt  is working towards including protein at meals and snacks. Exercise:    -Physical Activity is: Completed water therapy for exercise- two days a week . Walking dog around block ~ 2-3 times a week on other days chair exercises ~ 2-3 days a week. Has not been getting to [facility]. Nutrition Diagnosis: Overweight/Obesity related to currently undergoing MNT as evidenced by BMI of 44.25kgm2    Intervention:   Healthy behaviors: Adequate water intake. Eating at least 3 meals a day. Goals reviewed with pt  Patient Instructions   Goals:  1. Nutrition Goal:  Aim for regular meal times - remember order you want to eat- choose lean protein food first, followed by vegetables and fruits and then starch food last if still hungry. 2  Exercise Goal:  Continue chair exercises 2-3 times a week and continue walking dog 2-3 days a week.  Use the [facility] when you can! 3. Water Goal: Makes sure every day have at least 64 oz of water , no pop at all!          -Followup visit: 4 weeks with dietitian and Dr Honey Lacey. Pt advised to speak with PCP regarding insulin/DM adjustments when starts liver reduction diet pre surgery. Will review out of pocket cost for bariatric vitamins and protein powder at next office visit. Note was created by Mervin Saez, dietetic intern.   Note reviewed by Alberto Waters RD, LD,    Be Fabian RD, GIOVANNI, RD, LD  Dietitian- Weight Management [address] [place] [address]

## 2021-12-15 ENCOUNTER — OFFICE VISIT (OUTPATIENT)
Dept: BARIATRICS/WEIGHT MGMT | Age: 56
End: 2021-12-15

## 2021-12-15 ENCOUNTER — OFFICE VISIT (OUTPATIENT)
Dept: BARIATRICS/WEIGHT MGMT | Age: 56
End: 2021-12-15
Payer: MEDICARE

## 2021-12-15 VITALS
HEART RATE: 68 BPM | SYSTOLIC BLOOD PRESSURE: 110 MMHG | DIASTOLIC BLOOD PRESSURE: 60 MMHG | WEIGHT: 291 LBS | BODY MASS INDEX: 44.1 KG/M2 | TEMPERATURE: 97.5 F | HEIGHT: 68 IN

## 2021-12-15 DIAGNOSIS — M25.561 CHRONIC PAIN OF BOTH KNEES: ICD-10-CM

## 2021-12-15 DIAGNOSIS — E03.9 HYPOTHYROIDISM, UNSPECIFIED TYPE: ICD-10-CM

## 2021-12-15 DIAGNOSIS — G89.29 CHRONIC LOW BACK PAIN, UNSPECIFIED BACK PAIN LATERALITY, UNSPECIFIED WHETHER SCIATICA PRESENT: ICD-10-CM

## 2021-12-15 DIAGNOSIS — I10 HYPERTENSION, UNSPECIFIED TYPE: ICD-10-CM

## 2021-12-15 DIAGNOSIS — G89.29 CHRONIC PAIN OF BOTH KNEES: ICD-10-CM

## 2021-12-15 DIAGNOSIS — G47.33 OSA ON CPAP: ICD-10-CM

## 2021-12-15 DIAGNOSIS — E66.9 DIABETES MELLITUS TYPE 2 IN OBESE (HCC): ICD-10-CM

## 2021-12-15 DIAGNOSIS — F32.A DEPRESSION, UNSPECIFIED DEPRESSION TYPE: ICD-10-CM

## 2021-12-15 DIAGNOSIS — M25.562 CHRONIC PAIN OF BOTH KNEES: ICD-10-CM

## 2021-12-15 DIAGNOSIS — M54.50 CHRONIC LOW BACK PAIN, UNSPECIFIED BACK PAIN LATERALITY, UNSPECIFIED WHETHER SCIATICA PRESENT: ICD-10-CM

## 2021-12-15 DIAGNOSIS — E11.69 DIABETES MELLITUS TYPE 2 IN OBESE (HCC): ICD-10-CM

## 2021-12-15 DIAGNOSIS — Z99.89 OSA ON CPAP: ICD-10-CM

## 2021-12-15 DIAGNOSIS — K76.0 FATTY LIVER DISEASE, NONALCOHOLIC: ICD-10-CM

## 2021-12-15 DIAGNOSIS — F41.9 ANXIETY: ICD-10-CM

## 2021-12-15 DIAGNOSIS — E66.01 MORBID OBESITY WITH BMI OF 40.0-44.9, ADULT (HCC): Primary | ICD-10-CM

## 2021-12-15 PROCEDURE — 99213 OFFICE O/P EST LOW 20 MIN: CPT | Performed by: PHYSICIAN ASSISTANT

## 2021-12-15 RX ORDER — BIOTIN 10000 MCG
2 CAPSULE ORAL DAILY
COMMUNITY
End: 2022-01-21 | Stop reason: SINTOL

## 2021-12-15 RX ORDER — MELATONIN 10 MG/ML
15 DROPS ORAL DAILY
COMMUNITY
End: 2022-03-22 | Stop reason: ALTCHOICE

## 2021-12-15 RX ORDER — HYDROCORTISONE 25 MG/G
CREAM TOPICAL 2 TIMES DAILY PRN
COMMUNITY
Start: 2021-12-01

## 2021-12-15 SDOH — ECONOMIC STABILITY: TRANSPORTATION INSECURITY
IN THE PAST 12 MONTHS, HAS THE LACK OF TRANSPORTATION KEPT YOU FROM MEDICAL APPOINTMENTS OR FROM GETTING MEDICATIONS?: NO

## 2021-12-15 SDOH — ECONOMIC STABILITY: INCOME INSECURITY: IN THE LAST 12 MONTHS, WAS THERE A TIME WHEN YOU WERE NOT ABLE TO PAY THE MORTGAGE OR RENT ON TIME?: NO

## 2021-12-15 SDOH — ECONOMIC STABILITY: TRANSPORTATION INSECURITY
IN THE PAST 12 MONTHS, HAS LACK OF TRANSPORTATION KEPT YOU FROM MEETINGS, WORK, OR FROM GETTING THINGS NEEDED FOR DAILY LIVING?: NO

## 2021-12-15 SDOH — ECONOMIC STABILITY: HOUSING INSECURITY: IN THE LAST 12 MONTHS, HOW MANY PLACES HAVE YOU LIVED?: 1

## 2021-12-15 SDOH — ECONOMIC STABILITY: FOOD INSECURITY: WITHIN THE PAST 12 MONTHS, YOU WORRIED THAT YOUR FOOD WOULD RUN OUT BEFORE YOU GOT MONEY TO BUY MORE.: SOMETIMES TRUE

## 2021-12-15 SDOH — ECONOMIC STABILITY: HOUSING INSECURITY
IN THE LAST 12 MONTHS, WAS THERE A TIME WHEN YOU DID NOT HAVE A STEADY PLACE TO SLEEP OR SLEPT IN A SHELTER (INCLUDING NOW)?: NO

## 2021-12-15 SDOH — ECONOMIC STABILITY: FOOD INSECURITY: WITHIN THE PAST 12 MONTHS, THE FOOD YOU BOUGHT JUST DIDN'T LAST AND YOU DIDN'T HAVE MONEY TO GET MORE.: SOMETIMES TRUE

## 2021-12-15 SDOH — HEALTH STABILITY: PHYSICAL HEALTH: ON AVERAGE, HOW MANY DAYS PER WEEK DO YOU ENGAGE IN MODERATE TO STRENUOUS EXERCISE (LIKE A BRISK WALK)?: 4 DAYS

## 2021-12-15 SDOH — HEALTH STABILITY: PHYSICAL HEALTH: ON AVERAGE, HOW MANY MINUTES DO YOU ENGAGE IN EXERCISE AT THIS LEVEL?: 20 MIN

## 2021-12-15 ASSESSMENT — SOCIAL DETERMINANTS OF HEALTH (SDOH)
IN A TYPICAL WEEK, HOW MANY TIMES DO YOU TALK ON THE PHONE WITH FAMILY, FRIENDS, OR NEIGHBORS?: MORE THAN THREE TIMES A WEEK
HOW OFTEN DO YOU ATTENT MEETINGS OF THE CLUB OR ORGANIZATION YOU BELONG TO?: MORE THAN 4 TIMES PER YEAR
HOW HARD IS IT FOR YOU TO PAY FOR THE VERY BASICS LIKE FOOD, HOUSING, MEDICAL CARE, AND HEATING?: SOMEWHAT HARD
WITHIN THE LAST YEAR, HAVE TO BEEN RAPED OR FORCED TO HAVE ANY KIND OF SEXUAL ACTIVITY BY YOUR PARTNER OR EX-PARTNER?: NO
HOW OFTEN DO YOU ATTEND CHURCH OR RELIGIOUS SERVICES?: MORE THAN 4 TIMES PER YEAR
ARE YOU MARRIED, WIDOWED, DIVORCED, SEPARATED, NEVER MARRIED, OR LIVING WITH A PARTNER?: LIVING WITH PARTNER
WITHIN THE LAST YEAR, HAVE YOU BEEN AFRAID OF YOUR PARTNER OR EX-PARTNER?: NO
WITHIN THE LAST YEAR, HAVE YOU BEEN HUMILIATED OR EMOTIONALLY ABUSED IN OTHER WAYS BY YOUR PARTNER OR EX-PARTNER?: NO
HOW OFTEN DO YOU GET TOGETHER WITH FRIENDS OR RELATIVES?: TWICE A WEEK
WITHIN THE LAST YEAR, HAVE YOU BEEN KICKED, HIT, SLAPPED, OR OTHERWISE PHYSICALLY HURT BY YOUR PARTNER OR EX-PARTNER?: NO
DO YOU BELONG TO ANY CLUBS OR ORGANIZATIONS SUCH AS CHURCH GROUPS UNIONS, FRATERNAL OR ATHLETIC GROUPS, OR SCHOOL GROUPS?: YES

## 2021-12-15 ASSESSMENT — PATIENT HEALTH QUESTIONNAIRE - PHQ9
SUM OF ALL RESPONSES TO PHQ9 QUESTIONS 1 & 2: 2
2. FEELING DOWN, DEPRESSED OR HOPELESS: SEVERAL DAYS
2. FEELING DOWN, DEPRESSED OR HOPELESS: SEVERAL DAYS
DEPRESSION UNABLE TO ASSESS: YES
1. LITTLE INTEREST OR PLEASURE IN DOING THINGS: SEVERAL DAYS
SUM OF ALL RESPONSES TO PHQ9 QUESTIONS 1 & 2: 2
1. LITTLE INTEREST OR PLEASURE IN DOING THINGS: SEVERAL DAYS

## 2021-12-15 ASSESSMENT — LIFESTYLE VARIABLES
HOW MANY STANDARD DRINKS CONTAINING ALCOHOL DO YOU HAVE ON A TYPICAL DAY: 1 OR 2
HOW OFTEN DO YOU HAVE A DRINK CONTAINING ALCOHOL: NEVER

## 2021-12-15 NOTE — PATIENT INSTRUCTIONS
Goals: 1. Nutrition Goal:  Aim for regular meal times - remember order you want to eat- choose lean protein food first, followed by vegetables and fruits and then starch food last if still hungry. 2  Exercise Goal:  Continue chair exercises 2-3 times a week and continue walking dog 2-3 days a week.  Use the [facility] when you can! 3. Water Goal: Makes sure every day have at least 64 oz of water , no pop at all!

## 2021-12-15 NOTE — PATIENT INSTRUCTIONS
· Behavior modification discussed in detail in regards to dietary habits. · Nutritional education occurred during visit. Continue following recommendations  per dietitian. · Improvement in fitness/exercise discussed with patient and the need for this  with/without surgery. · Pulmonary Clearance received ( in media)  · Psychology evaluation with Dr. Jennifer Gentile completed and reviewed. · EGD Completed and reviewed. H. Pylori (-)  · Encouraged to attend support groups. Completed x 2  · Goal to lose 3-5% TBW prior to surgery. Currently down 26#  · Seca scale completed and reviewed  · Initial labs completed and reviewed  · Repeat Vit D 38/ Pth 59- both within normal range  · Slightly elevated liver enzymes- following fatty liver with PCP  · EKG completed and reviewed. NSR/ Nml EKG  · Advised not to get pregnant for 18-24 months post bariatric surgery as this can increase risk of malnourishment potentially leading to low birth weight or malformation. (tubal)  · No lifting/pushing/pulling over 20# for 4 weeks post-op  · No NSAIDS 10 days prior to bariatric surgery. Avoid NSAID use post-op  · Discussed Lovenox post-op bariatric surgery  · LOMN received  ·  Mammogram and Dexa scan completed-no concerning findings. · Advised to make apt with PCP to discuss how to adjust DM medications/insulin for pre-op diet and post-op.

## 2021-12-15 NOTE — PROGRESS NOTES
[address] [facility]  [address]  [address] [facility], [address] [facility]  [phone]      Visit Date:  12/15/2021  Weight Management Pre-Op Follow-up    HPI:    Medically Supervised follow-up- Month #5 of 3/6- desires RYGB    Estela Washington is here today for continued supervised weight management of morbid obesity. Weight today 291#. Down 6# since last month. Down 26# since starting with weight management. BMI 44. Overall, has done well with nutrition over the month. Off track a few days as she attended a  out of state. Has consistently been making good choices, most of the time. Has been eating 3 meals daily. Occasionally supplementing a meal with a protein shakes. Admits that she could still do better with portion control. Has done well with water intake over the month- minimum of 64oz daily. Continues to drink 1 cup of coffee daily. Rarely drinking pop. Comorbid conditions include sleep apnea tx with CPAP, depression, anxiety, PCOS, fatty liver, GERD, IBS, hypothyroid, hypertension, diabetes mellitus, hyperlipidemia, chronic back and knee pain. Continues to monitor blood sugars and are running < 100. No hypoglycemia. Continues to use 160 units of insulin daily. Continues Metformin and Januvia. Plans to discuss how to adjust medications with PCP for pre-op diet and post-op. LOMN received. Completed support groups x 2. EKG completed and reviewed. EGD with dilatation completed. Gastritis. Completed 30 days of Omeprazole. No heartburn. H. Pylori (-). Continues CPAP 6-8 hours per night. Completed CXR/ PFT/ 6 minute walk test. Pulmonary clearance received. Psychological clearance with Dr. Vel Santos completed and reviewed. Mammogram and Dexa scan completed- no concerning findings. Repeat Vit D within normal range at 38. If she does not lose enough weight with medical management she would like to proceed with RYGB. Physical Activity: walking/ chair exercises 4-5 times weekly. Current BMI: Body mass index is 44.25 kg/m². Current Weight:   Wt Readings from Last 3 Encounters:   12/15/21 291 lb (132 kg)   21 297 lb (134.7 kg)   10/25/21 297 lb 6.4 oz (134.9 kg)     Initial Body Weight:317    Past Medical History:  Past Medical History:   Diagnosis Date    Depression     GERD (gastroesophageal reflux disease)     Hypertension     Hypothyroidism     Irritable bowel syndrome     Osteoarthritis     Type II or unspecified type diabetes mellitus without mention of complication, not stated as uncontrolled        Past Surgical History:  Past Surgical History:   Procedure Laterality Date    CHOLECYSTECTOMY      COLONOSCOPY  longer than 3 yr ago    [facility]    KNEE ARTHROSCOPY Left     x2    OTHER SURGICAL HISTORY      knee ablation     TUBAL LIGATION         Past Social History:  Social History     Socioeconomic History    Marital status: Single     Spouse name: Not on file    Number of children: Not on file    Years of education: Not on file    Highest education level: Not on file   Occupational History    Not on file   Tobacco Use    Smoking status: Former Smoker     Packs/day: 2.00     Types: Cigarettes     Quit date: 4/15/1991     Years since quittin.6    Smokeless tobacco: Never Used   Substance and Sexual Activity    Alcohol use: Not Currently    Drug use: No    Sexual activity: Not on file   Other Topics Concern    Not on file   Social History Narrative    Not on file     Social Determinants of Health     Financial Resource Strain: Medium Risk    Difficulty of Paying Living Expenses: Somewhat hard   Food Insecurity: Food Insecurity Present    Worried About Running Out of Food in the Last Year: Sometimes true    Claudia of Food in the Last Year: Sometimes true   Transportation Needs: No Transportation Needs    Lack of Transportation (Medical): No    Lack of Transportation (Non-Medical):  No   Physical Activity: Insufficiently Active  Days of Exercise per Week: 4 days    Minutes of Exercise per Session: 20 min   Stress: Stress Concern Present    Feeling of Stress : To some extent   Social Connections: Socially Integrated    Frequency of Communication with Friends and Family: More than three times a week    Frequency of Social Gatherings with Friends and Family: Twice a week    Attends Evangelical Services: More than 4 times per year    Active Member of [address] [place] [address] or Organizations: Yes    Attends Club or Organization Meetings: More than 4 times per year    Marital Status: Living with partner   Intimate Partner Violence: Not At Risk    Fear of Current or Ex-Partner: No    Emotionally Abused: No    Physically Abused: No    Sexually Abused: No   Housing Stability: Low Risk     Unable to Pay for Housing in the Last Year: No    Number of [place] in the Last Year: 1    Unstable Housing in the Last Year: No        Medications:   Current Outpatient Medications   Medication Sig Dispense Refill    Biotin 10 MG CAPS Take 2 tablets by mouth daily      ELDERBERRY PO Take 2 tablets by mouth daily      medium chain triglycerides (MCT OIL) oil Take 15 mLs by mouth daily      SITagliptin (JANUVIA) 100 MG tablet Take 100 mg by mouth daily      losartan (COZAAR) 100 MG tablet Take 100 mg by mouth daily      Daily Multiple Vitamins TABS Take by mouth      Insulin Glargine (TOUJEO SOLOSTAR SC) Inject 160 Units into the skin daily      gabapentin (NEURONTIN) 600 MG tablet Take 600 mg by mouth 3 times daily.        Cholecalciferol (VITAMIN D) 125 MCG (5000 UT) CAPS Take 5,000 Units by mouth daily       atorvastatin (LIPITOR) 40 MG tablet Take 40 mg by mouth daily      hydroCHLOROthiazide (HYDRODIURIL) 50 MG tablet       cloNIDine (CATAPRES) 0.1 MG tablet Take 0.1 mg by mouth 2 times daily      DICLOFENAC PO Take 50 mg by mouth 2 times daily       Levothyroxine Sodium 137 MCG CAPS Take 137 mcg by mouth Daily       Citalopram Hydrobromide (CELEXA PO) Take 20 mg by mouth daily       CarBAMazepine (TEGRETOL PO) Take 200 mg by mouth 2 times daily       AMLODIPINE BESYLATE PO Take  by mouth daily.  aspirin 81 MG tablet Take 81 mg by mouth daily.  metFORMIN (GLUCOPHAGE) 1000 MG tablet TAKE 1 TABLET BY MOUTH TWICE DAILY WITH MORNING AND EVENING MEALS      hydrocortisone (ANUSOL-HC) 2.5 % CREA rectal cream        No current facility-administered medications for this visit. Allergies: Allergies   Allergen Reactions    Ampicillin Hives    Motrin [Ibuprofen] Hives       Subjective:    Review of Systems:  Constitutional: Denies any fever, chills,(+) fatigue. Wound: Denies any rash, skin color changes or wound problems. Resp: Denies any cough, (+)shortness of breath. CV: Denies any chest pain, orthopnea or syncope. MS: Denies myalgias,(+) arthralgias- back/knees  GI: (+) intermittent nausea with medications. Denies  vomiting, (+)diarrhea/ constipation, melena, hematochezia. : Denies any hematuria, hesitancy or dysuria. NEURO: Denies seizures, (+) headache. Objective:    /60 (Site: Right Lower Arm, Position: Sitting, Cuff Size: Large Adult)   Pulse 68   Temp 97.5 °F (36.4 °C) (Infrared)   Ht 5' 8\" (1.727 m)   Wt 291 lb (132 kg)   BMI 44.25 kg/m²   Physical Examination:   Constitutional: Alert and oriented to person, place and time. Well-developed, well- nourished. Head: Normocephalic and atraumatic  Neck: Supple. Eyes: EOMI b/l. Conjunctivae normal.  No scleral icterus. Respiratory: Effort normal. No respiratory distress. Abd: Benign  Ext:  Movement x 4. No edema  Skin; Warm and dry, no visible rashes, lesions or ulcers.    Neuro: Cranial Nerves Grossly Intact; nml coordination      CBC   Lab Results   Component Value Date    WBC 7.7 09/10/2020    RBC 4.41 09/10/2020    HGB 13.1 09/10/2020    HCT 41.4 09/10/2020    MCV 93.9 09/10/2020    MCH 29.7 09/10/2020    MCHC 31.6 09/10/2020    RDW 13.8 09/10/2020    PLT 311 09/10/2020    MPV 11.8 09/10/2020    RBCMORP NOT REPORTED 09/10/2020    MONOPCT 7 09/10/2020    LYMPHSABS 2.42 09/10/2020    MONOSABS 0.50 09/10/2020    EOSABS 0.67 09/10/2020    BASOSABS 0.06 09/10/2020        BMP/CMP   Lab Results   Component Value Date    GLUCOSE 142 09/10/2020    CREATININE 0.54 09/10/2020    BUN 9 09/10/2020     09/10/2020    K 4.2 09/10/2020     09/10/2020    CO2 20 09/10/2020    CALCIUM 10.2 09/10/2020    AST 50 09/10/2020    ALKPHOS 73 09/10/2020    PROT 7.5 09/10/2020    LABALBU 4.3 09/10/2020    BILITOT 0.39 09/10/2020    ALT 48 09/10/2020        PREALBUMIN   No results found for: PREALBUMIN     VITAMIN B12   No results found for: [id number]     VITAMIN D   No results found for: VITD25     PTH  No results found for: IPTH    VITAMIN B1/ THIAMINE   No results found for: [id number]     LIPID SCREEN (FASTING)   Lab Results   Component Value Date    HDL 36 09/10/2020   ,     HGA1C (T2DM ONLY)   No results found for: LABA1C, AVGG     TSH   Lab Results   Component Value Date    TSH 2.31 09/10/2020        IRON   No results found for: IRON     TIBC  No results found for: TIBC    FERRITIN  No results found for: FERRITIN    VITAMIN A  No results found for: RETINOL    NICOTINE  No results found for: NMET    UDS  No results found for: UDP    PSA  No results found for: LABPSA    GFR  Lab Results   Component Value Date    LABGLOM >60 09/10/2020       DEXA  No results found for this or any previous visit. Assessment:       Diagnosis Orders   1. BMI 40.0-44.9, adult ([facility] [address]     2. Depression, unspecified depression type     3. Anxiety     4. Fatty liver disease, nonalcoholic     5. Hypothyroidism, unspecified type     6. Hypertension, unspecified type     7. Diabetes mellitus type 2 in [facility] ([facility]     8. DIANNE on CPAP     9. Chronic low back pain, unspecified back pain laterality, unspecified whether sciatica present     10.  Chronic pain of both knees         Plan:    · Behavior

## 2022-01-19 NOTE — PROGRESS NOTES
Assessment: Patient is a 61 y.o. female seen for  Month six  follow up MNT visit for pre op bariatric desires bypass    Vitals from current and previous visits:      Vitals 3/97/0176   SYSTOLIC    DIASTOLIC    Site    Position    Cuff Size    Pulse    Temp    Resp    Weight 285 lb 6.4 oz   Height 5' 8\"   Body mass index 43.39 kg/m2   Initial weight at start of Weight Management Program was: 317 lbs     Maye lost 6 lbs over one month. Total weight loss is 32 lbs  -Weight goal: lose weight.     -Nutritionally relevant labs: Initial labs scanned into media section- Vit D-26.9, B1-161, elevated LFTs  Lab Results   Component Value Date/Time    GLUCOSE 142 (H) 09/10/2020 10:55 AM    HDL 36 (L) 09/10/2020 10:55 AM     Dr Mendosa Fly Clearance obtained  States dx'd with fatty Liver in past.- [facility] follow [facility] . EGD already done (-) H Pylori  - Is patient taking daily Multivitamin:  MVI daily pill form- Centrum Silver,   Taking VitD 3 5,000IU's daily- repeat Vitamin D and PTH WNL- vitamin D now 38.4. Taking Elderberry  Taking Metamucil on regular basis for bowels and a stool softener  Patient has discussed low carbohydrate pre op diet with PCP d/t will need insulin adjustments to prevent low blood sugars when this is started    Obtained Pulmonary clearance- this week states was put on inhalers from Pulmonary doctor for asthma. Has appointment with allergist    Pt and boyfriend in house. Does not work.   Goes to food pantry once a month-   Pt goes to bed around ~ midnite (lays in bed starting at 7p) and is up by 9am  -Food Recall:     States has been recognizing bad behaviors more often now- more mindful and working on slowing down  Breakfast: 9am-  Today had an oatmeal or may have a protein shake, or eggs  Lunch: ~ 1pm- yesterday had eggs and leos at home  Dinner:  David Whiting was later last nite for patient ~7pm- chicken, beef, or fish, and vebetables  Snacks- will have a snack in afternoon and in evening eating Keto Ice cream in evening- small portion   Reviewed with patient limiting Keto ice cream with higher fat/saturated fat content  Main Beverages:    Doing good with water - getting 64 oz a day- drinking two 32 oz per day, had one pop this month- advised pt to stay away from this , One cup coffee in morning    -Impression of Dietary Intake: - Pt  is working towards avoiding high fat/high sugar foods. Pt  is working towards  including protein at meals and snacks. Exercise:    -Physical Activity is: Completed water therapy for exercise- two days a week . Started going to [facility] to walk with a friend once a week, sometimes twice a week this month. chair exercises ~ 2-3 days a week. Nutrition Diagnosis: Overweight/Obesity related to currently undergoing MNT as evidenced by BMI of 43.3    Intervention:  Pt has completed six months of medically supervised weight loss. Pt ready from nutrition standpoint to proceed with bariatric surgery. Reviewed out of pocket cost for bariatric vitamins and protein powder. Samples given. Patient Instructions   Goals:  1. Remember you will need to separate you liquids from solids after surgery. No liquids 30 minutes before your meals and no liquids 30 minutes after your meals. 2. You will need to take your time with meals after surgery and begin practicing this now - chew foods really well and take 20-30 minutes at each meal.  3.  Aim for consistent and set meal times- choose lean protein foods first, followed by vegetables and fruit, then starch food last if still hungry. Consistency is key following bariatric surgery. 4.  Continue regular physical activity- recommend stay as active as you can leading up to surgery and after surgery this will remain important for your weight loss efforts         -Followup visit: Pt scheduled a six week f/u visit with RD at patient request if there continues to be a delay in surgery date d/t inpatient high census.       Juice Argueta, RD, GIOVANNI,   Dietitian- Weight Management [address]

## 2022-01-20 DIAGNOSIS — Z01.818 PREOP EXAMINATION: Primary | ICD-10-CM

## 2022-01-21 ENCOUNTER — OFFICE VISIT (OUTPATIENT)
Dept: BARIATRICS/WEIGHT MGMT | Age: 57
End: 2022-01-21
Payer: MEDICARE

## 2022-01-21 ENCOUNTER — OFFICE VISIT (OUTPATIENT)
Dept: BARIATRICS/WEIGHT MGMT | Age: 57
End: 2022-01-21

## 2022-01-21 VITALS
WEIGHT: 285.4 LBS | SYSTOLIC BLOOD PRESSURE: 114 MMHG | HEIGHT: 68 IN | BODY MASS INDEX: 43.26 KG/M2 | DIASTOLIC BLOOD PRESSURE: 70 MMHG | HEART RATE: 64 BPM | TEMPERATURE: 97.3 F

## 2022-01-21 VITALS — WEIGHT: 285.4 LBS | BODY MASS INDEX: 43.26 KG/M2 | HEIGHT: 68 IN

## 2022-01-21 DIAGNOSIS — M54.50 CHRONIC LOW BACK PAIN, UNSPECIFIED BACK PAIN LATERALITY, UNSPECIFIED WHETHER SCIATICA PRESENT: ICD-10-CM

## 2022-01-21 DIAGNOSIS — E03.9 HYPOTHYROIDISM, UNSPECIFIED TYPE: ICD-10-CM

## 2022-01-21 DIAGNOSIS — M25.561 CHRONIC PAIN OF BOTH KNEES: ICD-10-CM

## 2022-01-21 DIAGNOSIS — E66.01 MORBID OBESITY WITH BMI OF 40.0-44.9, ADULT (HCC): Primary | ICD-10-CM

## 2022-01-21 DIAGNOSIS — F41.9 ANXIETY: ICD-10-CM

## 2022-01-21 DIAGNOSIS — E78.00 HYPERCHOLESTEREMIA: ICD-10-CM

## 2022-01-21 DIAGNOSIS — G89.29 CHRONIC LOW BACK PAIN, UNSPECIFIED BACK PAIN LATERALITY, UNSPECIFIED WHETHER SCIATICA PRESENT: ICD-10-CM

## 2022-01-21 DIAGNOSIS — E66.9 DIABETES MELLITUS TYPE 2 IN OBESE (HCC): ICD-10-CM

## 2022-01-21 DIAGNOSIS — K21.9 GASTROESOPHAGEAL REFLUX DISEASE, UNSPECIFIED WHETHER ESOPHAGITIS PRESENT: ICD-10-CM

## 2022-01-21 DIAGNOSIS — K76.0 FATTY LIVER DISEASE, NONALCOHOLIC: ICD-10-CM

## 2022-01-21 DIAGNOSIS — E11.69 DIABETES MELLITUS TYPE 2 IN OBESE (HCC): ICD-10-CM

## 2022-01-21 DIAGNOSIS — K58.9 IRRITABLE BOWEL SYNDROME, UNSPECIFIED TYPE: ICD-10-CM

## 2022-01-21 DIAGNOSIS — M25.562 CHRONIC PAIN OF BOTH KNEES: ICD-10-CM

## 2022-01-21 DIAGNOSIS — E55.9 VITAMIN D DEFICIENCY: ICD-10-CM

## 2022-01-21 DIAGNOSIS — I10 HYPERTENSION, UNSPECIFIED TYPE: ICD-10-CM

## 2022-01-21 DIAGNOSIS — G47.33 OSA ON CPAP: ICD-10-CM

## 2022-01-21 DIAGNOSIS — Z99.89 OSA ON CPAP: ICD-10-CM

## 2022-01-21 DIAGNOSIS — E28.2 POLYCYSTIC OVARIAN DISEASE: ICD-10-CM

## 2022-01-21 DIAGNOSIS — G89.29 CHRONIC PAIN OF BOTH KNEES: ICD-10-CM

## 2022-01-21 DIAGNOSIS — F32.A DEPRESSION, UNSPECIFIED DEPRESSION TYPE: ICD-10-CM

## 2022-01-21 PROCEDURE — 99213 OFFICE O/P EST LOW 20 MIN: CPT | Performed by: SURGERY

## 2022-01-21 RX ORDER — FLUTICASONE FUROATE AND VILANTEROL TRIFENATATE 100; 25 UG/1; UG/1
1 POWDER RESPIRATORY (INHALATION) DAILY PRN
COMMUNITY
End: 2022-04-12

## 2022-01-22 ASSESSMENT — ENCOUNTER SYMPTOMS
ABDOMINAL DISTENTION: 0
SHORTNESS OF BREATH: 0
SORE THROAT: 0
ANAL BLEEDING: 0
EYE DISCHARGE: 0
ABDOMINAL PAIN: 0
SINUS PRESSURE: 0
RECTAL PAIN: 0
NAUSEA: 0
PHOTOPHOBIA: 0
ALLERGIC/IMMUNOLOGIC NEGATIVE: 1
EYE PAIN: 0
VOMITING: 0
EYE ITCHING: 0
CONSTIPATION: 0
CHOKING: 0
BACK PAIN: 1
FACIAL SWELLING: 0
STRIDOR: 0
COUGH: 0
DIARRHEA: 0
BLOOD IN STOOL: 0
EYE REDNESS: 0
CHEST TIGHTNESS: 0
COLOR CHANGE: 0
WHEEZING: 0
RHINORRHEA: 0
APNEA: 0
TROUBLE SWALLOWING: 0
VOICE CHANGE: 0

## 2022-01-22 NOTE — PROGRESS NOTES
Shauna De La Paz (:  1965)     ASSESSMENT:  1.  Morbid obesity (BMI 43)  2. Sleep apnea  3. Hypercholesterolemia  4. Depression  5. Anxiety  6. Polycystic ovarian disease  7. Fatty liver disease  8. Chronic lower back pain  9. Chronic left knee pain  10. GERD  11. Irritable bowel syndrome  12. Hypothyroidism  13. Hypertension  14. Vitamin D deficiency  15. Diabetes mellitus    PLAN:  1. Discussion again about the pros and cons of weight loss surgery. The risks benefits and alternatives to laparoscopic adjustable band, gastric sleeve and gastric Monet-en-Y bypass were discussed in detail. The pros and cons of robotic assisted, laparoscopic and open techniques were discussed. 2.  Behavior modification discussed again in regards to dietary habits. 3.  Nutritional education occurred during visit. Followup with dietitian for further evaluation. Continue to follow recommendations as directed. 4.  Options for medical management of morbid obesity discussed. 5.  Improvement in fitness/exercise discussed with patient and the need for this with/without surgery. 6.  Medical necessity letter from PCP. 7.  Follow-up in one month at weight management program at [address] [place] [address]. 8.  Signs and symptoms reviewed with patient that would be concerning and need her to return to office for re-evaluation. Patient states she will call if she has questions or concerns. 9. Multivitamin  10. Psychology evaluation completed. Follow-up as needed. 11. EGD completed. No significant hiatal hernia. H. pylori negative. Following up with GI as directed. 12.  Encouraged support groups  13. Send LOMN    Patient states that  she has not been able to lose enough adequate excess body weight with medical management only and would like to proceed with a robotic sleeve gastrectomy for further weight loss. More than 30 minutes spent with patient today.   Greater than 50% of the time was involved counseling, educaton and coordinating care. SUBJECTIVE/OBJECTIVE:    Chief Complaint   Patient presents with    Weight Loss     month 6 of 6 - bypass     HPI  Sally Rausch is a 62-year-old female who presents for follow-up at the weight management program secondary to her morbid obesity. BMI 43. Current weight 285 pounds. She has multiple significant comorbid conditions. She has not been able to lose enough adequate excess body weight with medical management only and is wishing to proceed with surgical intervention. She originally wanted to proceed with a gastric bypass but now she thinks a sleeve gastrectomy is her best option/choice. She has completed psychology evaluation. Wearing her CPAP machine. Following with the pulmonary service. Following with dietitian. Continuing to work on portion control and food selection. Completed upper endoscopy. H. pylori negative. No significant hiatal hernia. Completed 30 days of omeprazole secondary to some gastritis. Denies current chest or abdominal pain. No hematochezia or melena. No new urinary complaints. Lower back pain and lower extremity joint aching because of the excess body weight. Has been trying to do chair exercises and walking. Taking multivitamin. Taking vitamin D. She states she is excited to proceed with surgical intervention so as to continue to work towards her weight loss goals. Review of Systems   Constitutional: Positive for fatigue. Negative for activity change, appetite change, chills, diaphoresis, fever and unexpected weight change. HENT: Negative for congestion, dental problem, drooling, ear discharge, ear pain, facial swelling, hearing loss, mouth sores, nosebleeds, postnasal drip, rhinorrhea, sinus pressure, sneezing, sore throat, tinnitus, trouble swallowing and voice change. Eyes: Negative for photophobia, pain, discharge, redness, itching and visual disturbance.    Respiratory: Negative for apnea, cough, choking, chest tightness, shortness of breath, wheezing and stridor. Cardiovascular: Negative for chest pain, palpitations and leg swelling. Gastrointestinal: Negative for abdominal distention, abdominal pain, anal bleeding, blood in stool, constipation, diarrhea, nausea, rectal pain and vomiting. Endocrine: Negative. Genitourinary: Negative for decreased urine volume, difficulty urinating, dyspareunia, dysuria, enuresis, flank pain, frequency, genital sores, hematuria, menstrual problem, pelvic pain, urgency, vaginal bleeding, vaginal discharge and vaginal pain. Musculoskeletal: Positive for back pain. Negative for arthralgias, gait problem, joint swelling, myalgias, neck pain and neck stiffness. Skin: Negative for color change, pallor, rash and wound. Allergic/Immunologic: Negative. Neurological: Negative for dizziness, tremors, seizures, syncope, facial asymmetry, speech difficulty, weakness, light-headedness, numbness and headaches. Hematological: Negative for adenopathy. Does not bruise/bleed easily. Psychiatric/Behavioral: Negative for agitation, behavioral problems, confusion, decreased concentration, dysphoric mood, hallucinations, self-injury, sleep disturbance and suicidal ideas. The patient is nervous/anxious. The patient is not hyperactive.         Past Medical History:   Diagnosis Date    Depression     GERD (gastroesophageal reflux disease)     Hypertension     Hypothyroidism     Irritable bowel syndrome     Osteoarthritis     Type II or unspecified type diabetes mellitus without mention of complication, not stated as uncontrolled        Past Surgical History:   Procedure Laterality Date    CHOLECYSTECTOMY      COLONOSCOPY  longer than 3 yr ago    [facility]    KNEE ARTHROSCOPY Left     x2    OTHER SURGICAL HISTORY      knee ablation     TUBAL LIGATION         Current Outpatient Medications   Medication Sig Dispense Refill    fluticasone-vilanterol (BREO ELLIPTA) 100-25 MCG/INH AEPB inhaler Inhale into the lungs daily      ALBUTEROL SULFATE HFA IN Inhale into the lungs      ELDERBERRY PO Take 2 tablets by mouth daily      medium chain triglycerides (MCT OIL) oil Take 15 mLs by mouth daily      metFORMIN (GLUCOPHAGE) 1000 MG tablet TAKE 1 TABLET BY MOUTH TWICE DAILY WITH MORNING AND EVENING MEALS      SITagliptin (JANUVIA) 100 MG tablet Take 100 mg by mouth daily      losartan (COZAAR) 100 MG tablet Take 100 mg by mouth daily      Daily Multiple Vitamins TABS Take by mouth      Insulin Glargine (TOUJEO SOLOSTAR SC) Inject 130 Units into the skin daily       gabapentin (NEURONTIN) 600 MG tablet Take 600 mg by mouth 3 times daily.  Cholecalciferol (VITAMIN D) 125 MCG (5000 UT) CAPS Take 5,000 Units by mouth daily       atorvastatin (LIPITOR) 40 MG tablet Take 40 mg by mouth daily      hydroCHLOROthiazide (HYDRODIURIL) 50 MG tablet       cloNIDine (CATAPRES) 0.1 MG tablet Take 0.1 mg by mouth 2 times daily      Levothyroxine Sodium 137 MCG CAPS Take 137 mcg by mouth Daily       Citalopram Hydrobromide (CELEXA PO) Take 20 mg by mouth daily       CarBAMazepine (TEGRETOL PO) Take 200 mg by mouth 2 times daily       AMLODIPINE BESYLATE PO Take  by mouth daily.  aspirin 81 MG tablet Take 81 mg by mouth daily.  hydrocortisone (ANUSOL-HC) 2.5 % CREA rectal cream       DICLOFENAC PO Take 50 mg by mouth 2 times daily  (Patient not taking: Reported on 1/21/2022)       No current facility-administered medications for this visit.        Allergies   Allergen Reactions    Ampicillin Hives    Motrin [Ibuprofen] Hives       Family History   Problem Relation Age of Onset    Cancer Mother     High Blood Pressure Father     Breast Cancer Paternal Aunt     Breast Cancer Paternal Cousin         bilateral    Breast Cancer Maternal Aunt     Breast Cancer Maternal Aunt     Breast Cancer Maternal Cousin         bilateral    Breast Cancer Maternal Cousin     Prostate Cancer Maternal Uncle     Prostate Cancer Paternal Uncle        Social History     Socioeconomic History    Marital status: Single     Spouse name: Not on file    Number of children: Not on file    Years of education: Not on file    Highest education level: Not on file   Occupational History    Not on file   Tobacco Use    Smoking status: Former Smoker     Packs/day: 2.00     Types: Cigarettes     Quit date: 4/15/1991     Years since quittin.7    Smokeless tobacco: Never Used   Substance and Sexual Activity    Alcohol use: Not Currently    Drug use: No    Sexual activity: Not on file   Other Topics Concern    Not on file   Social History Narrative    Not on file     Social Determinants of Health     Financial Resource Strain: Medium Risk    Difficulty of Paying Living Expenses: Somewhat hard   Food Insecurity: Food Insecurity Present    Worried About Running Out of Food in the Last Year: Sometimes true    Claudia of Food in the Last Year: Sometimes true   Transportation Needs: No Transportation Needs    Lack of Transportation (Medical): No    Lack of Transportation (Non-Medical): No   Physical Activity: Insufficiently Active    Days of Exercise per Week: 4 days    Minutes of Exercise per Session: 20 min   Stress: Stress Concern Present    Feeling of Stress : To some extent   Social Connections: Socially Integrated    Frequency of Communication with Friends and Family: More than three times a week    Frequency of Social Gatherings with Friends and Family: Twice a week    Attends Sabianist Services: More than 4 times per year    Active Member of [address] [place] [address] or Organizations:  Yes    Attends Club or Organization Meetings: More than 4 times per year    Marital Status: Living with partner   Intimate Partner Violence: Not At Risk    Fear of Current or Ex-Partner: No    Emotionally Abused: No    Physically Abused: No    Sexually Abused: No   Housing Stability: Low Risk     Unable to Pay for Housing in the Last Year: No    Number of Places Lived in the Last Year: 1    Unstable Housing in the Last Year: No     Vitals:    01/21/22 1003   BP: 114/70   Site: Right Upper Arm   Position: Sitting   Cuff Size: Large Adult   Pulse: 64   Temp: 97.3 °F (36.3 °C)   TempSrc: Infrared   Weight: 285 lb 6.4 oz (129.5 kg)   Height: 5' 8\" (1.727 m)     Body mass index is 43.39 kg/m². Wt Readings from Last 3 Encounters:   01/21/22 285 lb 6.4 oz (129.5 kg)   01/21/22 285 lb 6.4 oz (129.5 kg)   12/15/21 291 lb (132 kg)     Physical Exam  Vitals reviewed. Constitutional:       General: She is not in acute distress. Appearance: She is well-developed. She is not diaphoretic. HENT:      Head: Normocephalic and atraumatic. Right Ear: External ear normal.      Left Ear: External ear normal.      Nose: Nose normal.   Eyes:      General: No scleral icterus. Right eye: No discharge. Left eye: No discharge. Conjunctiva/sclera: Conjunctivae normal.   Cardiovascular:      Rate and Rhythm: Normal rate and regular rhythm. Heart sounds: Normal heart sounds. Pulmonary:      Effort: Pulmonary effort is normal. No respiratory distress. Breath sounds: Normal breath sounds. No wheezing or rales. Chest:      Chest wall: No tenderness. Abdominal:      General: Bowel sounds are normal. There is no distension. Palpations: Abdomen is soft. There is no mass. Tenderness: There is no abdominal tenderness. There is no guarding or rebound. Musculoskeletal:         General: No tenderness. Normal range of motion. Cervical back: Normal range of motion and neck supple. Skin:     General: Skin is warm and dry. Coloration: Skin is not pale. Findings: No erythema or rash. Neurological:      Mental Status: She is alert and oriented to person, place, and time. Cranial Nerves: No cranial nerve deficit.    Psychiatric:         Behavior: Behavior normal.         Thought Content:  Thought content normal.         Judgment: Judgment normal.       CBC   Lab Results   Component Value Date    WBC 7.7 09/10/2020    RBC 4.41 09/10/2020    HGB 13.1 09/10/2020    HCT 41.4 09/10/2020    MCV 93.9 09/10/2020    MCH 29.7 09/10/2020    MCHC 31.6 09/10/2020    RDW 13.8 09/10/2020     09/10/2020    MPV 11.8 09/10/2020    RBCMORP NOT REPORTED 09/10/2020    MONOPCT 7 09/10/2020    LYMPHSABS 2.42 09/10/2020    MONOSABS 0.50 09/10/2020    EOSABS 0.67 09/10/2020    BASOSABS 0.06 09/10/2020      BMP/CMP   Lab Results   Component Value Date    GLUCOSE 142 09/10/2020    CREATININE 0.54 09/10/2020    BUN 9 09/10/2020     09/10/2020    K 4.2 09/10/2020     09/10/2020    CO2 20 09/10/2020    CALCIUM 10.2 09/10/2020    AST 50 09/10/2020    ALKPHOS 73 09/10/2020    PROT 7.5 09/10/2020    LABALBU 4.3 09/10/2020    BILITOT 0.39 09/10/2020    ALT 48 09/10/2020      PREALBUMIN   No results found for: PREALBUMIN   VITAMIN B12   No results found for: FYFPLXNO89   VITAMIN D   No results found for: VITD25   PTH  No results found for: IPTH  VITAMIN B1/ THIAMINE   No results found for: [id number]   LIPID SCREEN (FASTING)   Lab Results   Component Value Date    HDL 36 09/10/2020   ,   HGA1C (T2DM ONLY)   No results found for: LABA1C, AVGG   TSH   Lab Results   Component Value Date    TSH 2.31 09/10/2020      IRON   No results found for: IRON   TIBC  No results found for: TIBC  FERRITIN  No results found for: FERRITIN  VITAMIN A  No results found for: RETINOL  NICOTINE  No results found for: NMET  UDS  No results found for: UDP  PSA  No results found for: LABPSA  GFR  Lab Results   Component Value Date    LABGLOM >60 09/10/2020     DEXA  Results for orders placed during the hospital encounter of 08/09/21    DEXA BONE DENSITY AXIAL SKELETON    Narrative  EXAM: DEXA BONE DENSITY AXIAL SKELETON    Bone Mineral Density    HISTORY: 61 years, Female, Pre-op testing, Post-menopausal, Morbid obesity with BMI of 45.0-49.9, adult ([facility] [address] Morbid obesity with BMI of 45.0-49.9, adult ([facility] [address] Malnutrition screen. COMPARISON: There are no prior exams for comparison. FINDINGS:  Bone densitometry has been performed using a Hologic bone densitometer. This evaluation includes the lumbar spine and both hips. Current exam:    Lumbar Spine: L1-L4  Bone mineral density (gm/cm2): 1.094, T-score: 0.4, Z-score: 1.6, This qualifies as normal bone mineral density. Femoral Neck Left:  Bone mineral density (gm/cm2): 0.844, T-score: -0.0, Z-score: 1.1, This qualifies as normal bone mineral density. Femoral Neck Right:  Bone mineral density (gm/cm2): 0.859, T-score: 0.1, Z-score: 1.2, This qualifies as normal bone mineral density. Total Hip Left:  Bone mineral density (gm/cm2): 0.993, T-score: 0.4, Z-score: 1.2, This qualifies as normal bone mineral density. Total Hip Right:  Bone mineral density (gm/cm2): 0.946, T-score: 0.0, Z-score: 0.8, This qualifies as normal bone mineral density. The patient does not qualify for a FRAX assessment because there is a T score at or above -1.0. Impression  This patient has normal bone mineral density using the World Health Organization criteria. The patient is at a normal risk for fracture. Recommendations:  Therapy recommendations need to be tailored to each individual patient. Using the [facility] FRAX absolute fracture algorithm, the [address] [place] [address] recommends beginning pharmacological therapy in postmenopausal women  and men over the age of 48 with a 8 year probability of a hip fracture of >3% OR with the 10 year probability of a major osteoporotic fracture of >20%. Please reconsider testing based on risk factors. Currently, Medicare will only reimburse for a central DEXA examination every two years, unless the patient is on chronic glucocorticoid therapy. **This report has been created using voice recognition software.  It may contain minor errors which are inherent in voice recognition technology. **    Final report electronically signed by Dr. Rodolfo Godinez on 8/9/2021 3:33 PM    Patient Active Problem List   Diagnosis    Body mass index 45.0-49.9, adult ([facility] [address]    Depression    Gastroesophageal reflux disease    Hyperlipidemia    Anxiety    Fatty liver disease, nonalcoholic    Hypothyroidism    Hypertension    Diabetes mellitus type 2 in obese ([facility] [address]    Vitamin D deficiency    DIANNE on CPAP                               An electronic signature was used to authenticate this note.     --Floyd Farias MD

## 2022-02-28 ENCOUNTER — OFFICE VISIT (OUTPATIENT)
Dept: BARIATRICS/WEIGHT MGMT | Age: 57
End: 2022-02-28

## 2022-02-28 VITALS — BODY MASS INDEX: 42.13 KG/M2 | WEIGHT: 278 LBS | HEIGHT: 68 IN

## 2022-02-28 DIAGNOSIS — E66.01 MORBID OBESITY WITH BMI OF 40.0-44.9, ADULT (HCC): Primary | ICD-10-CM

## 2022-02-28 DIAGNOSIS — Z01.818 PREOP EXAMINATION: ICD-10-CM

## 2022-02-28 NOTE — PROGRESS NOTES
Tyler Lemus was seen today for pre-operative teaching class. She   was educated today on surgery procedure, possible complications, Lovenox self administration (teaching guide given to patient as well as Lovenox DVD viewed today), use of incentive spirometer for 2 weeks prior to surgery date and instructed to remain NPO after midnight the night before surgery, or risk cancellation of surgical procedure. Importance of recognizing signs and symptoms of wound infection were discussed as well as when to contact the physician. We discussed the flow of events on the day of surgery from admit to SDS, OR, PACU, and 7K admit. I reinforced the need to walk post operative on 7K and she voiced understanding of this. Pre-operative measurements were taken and scanned into chart as well as measurement for PO day 1 gastrograffin swallow test. SECA scale completed today. Educated on post-op pain medication and how to dispose of any unused pain medications.

## 2022-02-28 NOTE — PROGRESS NOTES
Tonny Frankel lost 39 lbs  since joining Weight Management Program.  After nutrition evaluation and education, patient is considered to be a good surgical candidate from a MNT perspective. Patient's body composition for pre-op teaching class  was measured using the [facility] Scale. Results were saved and reviewed with the patient. Patient was educated on 2- week pre-operative nutrition protocol and post test completed. Pre-operative and post-operative diet guidelines were discussed and written information was provided. Nectar protein supplements were purchased. Vitamin regimen with Bariatric Advantage and/or Celebrate vitamins was explained, and patient purchased a 90 day supply at this visit. Importance of vitamin compliance was discussed and potential of vitamin deficiencies was reviewed. Encouraged continued physical activity. Patient signed agreement stating they are committed to lifelong follow up, smoking and alcohol cessation, vitamin compliance, and 2 week pre-operative dietary protocol.

## 2022-03-03 DIAGNOSIS — E66.01 MORBID OBESITY WITH BMI OF 40.0-44.9, ADULT (HCC): Primary | ICD-10-CM

## 2022-03-03 RX ORDER — METOCLOPRAMIDE 10 MG/1
10 TABLET ORAL EVERY 6 HOURS PRN
Qty: 30 TABLET | Refills: 0 | Status: SHIPPED | OUTPATIENT
Start: 2022-03-28 | End: 2022-04-12

## 2022-03-03 RX ORDER — ASPIRIN 81 MG
100 TABLET, DELAYED RELEASE (ENTERIC COATED) ORAL 2 TIMES DAILY
Qty: 30 TABLET | Refills: 1 | Status: SHIPPED | OUTPATIENT
Start: 2022-03-28 | End: 2022-07-11 | Stop reason: ALTCHOICE

## 2022-03-03 RX ORDER — OMEPRAZOLE 40 MG/1
40 CAPSULE, DELAYED RELEASE ORAL
Qty: 30 CAPSULE | Refills: 2 | Status: SHIPPED | OUTPATIENT
Start: 2022-03-28 | End: 2022-07-11 | Stop reason: SDUPTHER

## 2022-03-03 RX ORDER — ONDANSETRON 4 MG/1
4 TABLET, FILM COATED ORAL EVERY 4 HOURS PRN
Qty: 30 TABLET | Refills: 0 | Status: SHIPPED | OUTPATIENT
Start: 2022-03-28 | End: 2022-04-11

## 2022-03-21 NOTE — PROGRESS NOTES
PAT call attempted, patient unavailable, left message to please call us back at your earliest convenience; [phone]

## 2022-03-22 ENCOUNTER — TELEPHONE (OUTPATIENT)
Dept: BARIATRICS/WEIGHT MGMT | Age: 57
End: 2022-03-22

## 2022-03-22 RX ORDER — OMEGA-3S/DHA/EPA/FISH OIL/D3 300MG-1000
1 CAPSULE ORAL DAILY
COMMUNITY
Start: 2022-03-01

## 2022-03-22 NOTE — PROGRESS NOTES
Follow all instructions given by your physician    NPO after midnight   Sips of water am of surgery with allowed medications  Bring insurance info and 's license  Wear comfortable clean clothing  No jewelry or contact lenses to be worn day of surgery  Case for glasses. Shower night before and morning of surgery with a liquid antibacterial soap, dry with fresh clean towel; no lotions, creams or powder. Clean sheets and pillow case on bed night before surgery  Bring medications in original bottles  Bring CPAP/BIPAP machine if you have one ( you may be charged if one is needed in recovery room )   needed at discharge and someone over 18 to stay with you for 24 hours overnight (surgery may be cancelled if you don't have this)  Report to [facility] on 2nd floor  If you would become ill prior to surgery, please call the surgeon  May have a visitor with you, we request that you limit to 2 visitors in pre-op area  Please bring and wear mask  Call [phone] for any questions  Covid questionnaire Complete; Patient negative for symptoms or exposure. See documentation.

## 2022-03-22 NOTE — TELEPHONE ENCOUNTER
----- Message from Harjinder Sorensen RN sent at 3/21/2022  4:13 PM EDT -----  Will you tammy hua call her PCP office ?-we fax'd a clearance form on 3/1 and have never got that back . Surgery is next Monday 3/28.     Thanks  Luisa Guzman

## 2022-03-23 ENCOUNTER — PREP FOR PROCEDURE (OUTPATIENT)
Dept: BARIATRICS/WEIGHT MGMT | Age: 57
End: 2022-03-23

## 2022-03-23 RX ORDER — SODIUM CHLORIDE 0.9 % (FLUSH) 0.9 %
5-40 SYRINGE (ML) INJECTION EVERY 12 HOURS SCHEDULED
Status: CANCELLED | OUTPATIENT
Start: 2022-03-28

## 2022-03-23 RX ORDER — SCOLOPAMINE TRANSDERMAL SYSTEM 1 MG/1
1 PATCH, EXTENDED RELEASE TRANSDERMAL
Status: CANCELLED | OUTPATIENT
Start: 2022-03-28 | End: 2022-03-31

## 2022-03-23 RX ORDER — ONDANSETRON 2 MG/ML
4 INJECTION INTRAMUSCULAR; INTRAVENOUS ONCE
Status: CANCELLED | OUTPATIENT
Start: 2022-03-28

## 2022-03-23 RX ORDER — SODIUM CHLORIDE 9 MG/ML
25 INJECTION, SOLUTION INTRAVENOUS PRN
Status: CANCELLED | OUTPATIENT
Start: 2022-03-28

## 2022-03-23 RX ORDER — CIPROFLOXACIN 2 MG/ML
400 INJECTION, SOLUTION INTRAVENOUS
Status: CANCELLED | OUTPATIENT
Start: 2022-03-28 | End: 2022-03-28

## 2022-03-23 RX ORDER — SODIUM CHLORIDE 0.9 % (FLUSH) 0.9 %
5-40 SYRINGE (ML) INJECTION PRN
Status: CANCELLED | OUTPATIENT
Start: 2022-03-28

## 2022-03-24 NOTE — H&P
counseling, educaton and coordinating care.     SUBJECTIVE/OBJECTIVE:          Chief Complaint   Patient presents with    Weight Loss       month 6 of 6 - bypass      HPI  Tata Grove is a 76-year-old female who presents for follow-up at the weight management program secondary to her morbid obesity. BMI 43. Current weight 285 pounds. She has multiple significant comorbid conditions. She has not been able to lose enough adequate excess body weight with medical management only and is wishing to proceed with surgical intervention. She originally wanted to proceed with a gastric bypass but now she thinks a sleeve gastrectomy is her best option/choice. She has completed psychology evaluation. Wearing her CPAP machine. Following with the pulmonary service. Following with dietitian. Continuing to work on portion control and food selection. Completed upper endoscopy. H. pylori negative. No significant hiatal hernia. Completed 30 days of omeprazole secondary to some gastritis. Denies current chest or abdominal pain. No hematochezia or melena. No new urinary complaints. Lower back pain and lower extremity joint aching because of the excess body weight. Has been trying to do chair exercises and walking. Taking multivitamin. Taking vitamin D. She states she is excited to proceed with surgical intervention so as to continue to work towards her weight loss goals.     Review of Systems   Constitutional: Positive for fatigue. Negative for activity change, appetite change, chills, diaphoresis, fever and unexpected weight change. HENT: Negative for congestion, dental problem, drooling, ear discharge, ear pain, facial swelling, hearing loss, mouth sores, nosebleeds, postnasal drip, rhinorrhea, sinus pressure, sneezing, sore throat, tinnitus, trouble swallowing and voice change. Eyes: Negative for photophobia, pain, discharge, redness, itching and visual disturbance.    Respiratory: Negative for apnea, cough, choking, chest tightness, shortness of breath, wheezing and stridor. Cardiovascular: Negative for chest pain, palpitations and leg swelling. Gastrointestinal: Negative for abdominal distention, abdominal pain, anal bleeding, blood in stool, constipation, diarrhea, nausea, rectal pain and vomiting. Endocrine: Negative. Genitourinary: Negative for decreased urine volume, difficulty urinating, dyspareunia, dysuria, enuresis, flank pain, frequency, genital sores, hematuria, menstrual problem, pelvic pain, urgency, vaginal bleeding, vaginal discharge and vaginal pain. Musculoskeletal: Positive for back pain. Negative for arthralgias, gait problem, joint swelling, myalgias, neck pain and neck stiffness. Skin: Negative for color change, pallor, rash and wound. Allergic/Immunologic: Negative. Neurological: Negative for dizziness, tremors, seizures, syncope, facial asymmetry, speech difficulty, weakness, light-headedness, numbness and headaches. Hematological: Negative for adenopathy. Does not bruise/bleed easily. Psychiatric/Behavioral: Negative for agitation, behavioral problems, confusion, decreased concentration, dysphoric mood, hallucinations, self-injury, sleep disturbance and suicidal ideas. The patient is nervous/anxious.  The patient is not hyperactive.          Past Medical History        Past Medical History:   Diagnosis Date    Depression      GERD (gastroesophageal reflux disease)      Hypertension      Hypothyroidism      Irritable bowel syndrome      Osteoarthritis      Type II or unspecified type diabetes mellitus without mention of complication, not stated as uncontrolled              Past Surgical History         Past Surgical History:   Procedure Laterality Date    CHOLECYSTECTOMY        COLONOSCOPY   longer than 3 yr ago     [facility]    KNEE ARTHROSCOPY Left       x2    OTHER SURGICAL HISTORY         knee ablation     TUBAL LIGATION           Current Facility-Administered Medications          Current Outpatient Medications   Medication Sig Dispense Refill    fluticasone-vilanterol (BREO ELLIPTA) 100-25 MCG/INH AEPB inhaler Inhale into the lungs daily        ALBUTEROL SULFATE HFA IN Inhale into the lungs        ELDERBERRY PO Take 2 tablets by mouth daily        medium chain triglycerides (MCT OIL) oil Take 15 mLs by mouth daily        metFORMIN (GLUCOPHAGE) 1000 MG tablet TAKE 1 TABLET BY MOUTH TWICE DAILY WITH MORNING AND EVENING MEALS        SITagliptin (JANUVIA) 100 MG tablet Take 100 mg by mouth daily        losartan (COZAAR) 100 MG tablet Take 100 mg by mouth daily        Daily Multiple Vitamins TABS Take by mouth        Insulin Glargine (TOUJEO SOLOSTAR SC) Inject 130 Units into the skin daily         gabapentin (NEURONTIN) 600 MG tablet Take 600 mg by mouth 3 times daily.         Cholecalciferol (VITAMIN D) 125 MCG (5000 UT) CAPS Take 5,000 Units by mouth daily         atorvastatin (LIPITOR) 40 MG tablet Take 40 mg by mouth daily        hydroCHLOROthiazide (HYDRODIURIL) 50 MG tablet          cloNIDine (CATAPRES) 0.1 MG tablet Take 0.1 mg by mouth 2 times daily        Levothyroxine Sodium 137 MCG CAPS Take 137 mcg by mouth Daily         Citalopram Hydrobromide (CELEXA PO) Take 20 mg by mouth daily         CarBAMazepine (TEGRETOL PO) Take 200 mg by mouth 2 times daily         AMLODIPINE BESYLATE PO Take  by mouth daily.        aspirin 81 MG tablet Take 81 mg by mouth daily.        hydrocortisone (ANUSOL-HC) 2.5 % CREA rectal cream          DICLOFENAC PO Take 50 mg by mouth 2 times daily  (Patient not taking: Reported on 1/21/2022)          No current facility-administered medications for this visit.                 Allergies   Allergen Reactions    Ampicillin Hives    Motrin [Ibuprofen] Hives         Family History         Family History   Problem Relation Age of Onset    Cancer Mother      High Blood Pressure Father      Breast Cancer Paternal Aunt      Breast Cancer Paternal Cousin           bilateral    Breast Cancer Maternal Aunt      Breast Cancer Maternal Aunt      Breast Cancer Maternal Cousin           bilateral    Breast Cancer Maternal Cousin      Prostate Cancer Maternal Uncle      Prostate Cancer Paternal Uncle              Social History               Socioeconomic History    Marital status: Single       Spouse name: Not on file    Number of children: Not on file    Years of education: Not on file    Highest education level: Not on file   Occupational History    Not on file   Tobacco Use    Smoking status: Former Smoker       Packs/day: 2.00       Types: Cigarettes       Quit date: 4/15/1991       Years since quittin.7    Smokeless tobacco: Never Used   Substance and Sexual Activity    Alcohol use: Not Currently    Drug use: No    Sexual activity: Not on file   Other Topics Concern    Not on file   Social History Narrative    Not on file      Social Determinants of Health          Financial Resource Strain: Medium Risk    Difficulty of Paying Living Expenses: Somewhat hard   Food Insecurity: Food Insecurity Present    Worried About Running Out of Food in the Last Year: Sometimes true    Claudia of Food in the Last Year: Sometimes true   Transportation Needs: No Transportation Needs    Lack of Transportation (Medical): No    Lack of Transportation (Non-Medical): No   Physical Activity: Insufficiently Active    Days of Exercise per Week: 4 days    Minutes of Exercise per Session: 20 min   Stress: Stress Concern Present    Feeling of Stress : To some extent   Social Connections: Socially Integrated    Frequency of Communication with Friends and Family: More than three times a week    Frequency of Social Gatherings with Friends and Family: Twice a week    Attends Anabaptist Services: More than 4 times per year    Active Member of [address] [place] [address] or Organizations:  Yes    Attends Club or Organization Meetings: More than 4 times per year    Marital Status: Living with partner   Intimate Partner Violence: Not At Risk    Fear of Current or Ex-Partner: No    Emotionally Abused: No    Physically Abused: No    Sexually Abused: No   Housing Stability: Low Risk     Unable to Pay for Housing in the Last Year: No    Number of Places Lived in the Last Year: 1    Unstable Housing in the Last Year: No         Vitals       Vitals:     01/21/22 1003   BP: 114/70   Site: Right Upper Arm   Position: Sitting   Cuff Size: Large Adult   Pulse: 64   Temp: 97.3 °F (36.3 °C)   TempSrc: Infrared   Weight: 285 lb 6.4 oz (129.5 kg)   Height: 5' 8\" (1.727 m)         Body mass index is 43.39 kg/m².         Wt Readings from Last 3 Encounters:   01/21/22 285 lb 6.4 oz (129.5 kg)   01/21/22 285 lb 6.4 oz (129.5 kg)   12/15/21 291 lb (132 kg)      Physical Exam  Vitals reviewed. Constitutional:       General: She is not in acute distress. Appearance: She is well-developed. She is not diaphoretic. HENT:      Head: Normocephalic and atraumatic. Right Ear: External ear normal.      Left Ear: External ear normal.      Nose: Nose normal.   Eyes:      General: No scleral icterus. Right eye: No discharge. Left eye: No discharge. Conjunctiva/sclera: Conjunctivae normal.   Cardiovascular:      Rate and Rhythm: Normal rate and regular rhythm. Heart sounds: Normal heart sounds. Pulmonary:      Effort: Pulmonary effort is normal. No respiratory distress. Breath sounds: Normal breath sounds. No wheezing or rales. Chest:      Chest wall: No tenderness. Abdominal:      General: Bowel sounds are normal. There is no distension. Palpations: Abdomen is soft. There is no mass. Tenderness: There is no abdominal tenderness. There is no guarding or rebound. Musculoskeletal:         General: No tenderness. Normal range of motion.       Cervical back: Normal range of motion and neck supple. Skin:     General: Skin is warm and dry. Coloration: Skin is not pale. Findings: No erythema or rash. Neurological:      Mental Status: She is alert and oriented to person, place, and time. Cranial Nerves: No cranial nerve deficit. Psychiatric:         Behavior: Behavior normal.         Thought Content:  Thought content normal.         Judgment: Judgment normal.         CBC         Lab Results   Component Value Date     WBC 7.7 09/10/2020     RBC 4.41 09/10/2020     HGB 13.1 09/10/2020     HCT 41.4 09/10/2020     MCV 93.9 09/10/2020     MCH 29.7 09/10/2020     MCHC 31.6 09/10/2020     RDW 13.8 09/10/2020      09/10/2020     MPV 11.8 09/10/2020     RBCMORP NOT REPORTED 09/10/2020     MONOPCT 7 09/10/2020     LYMPHSABS 2.42 09/10/2020     MONOSABS 0.50 09/10/2020     EOSABS 0.67 09/10/2020     BASOSABS 0.06 09/10/2020      BMP/CMP         Lab Results   Component Value Date     GLUCOSE 142 09/10/2020     CREATININE 0.54 09/10/2020     BUN 9 09/10/2020      09/10/2020     K 4.2 09/10/2020      09/10/2020     CO2 20 09/10/2020     CALCIUM 10.2 09/10/2020     AST 50 09/10/2020     ALKPHOS 73 09/10/2020     PROT 7.5 09/10/2020     LABALBU 4.3 09/10/2020     BILITOT 0.39 09/10/2020     ALT 48 09/10/2020      PREALBUMIN   No results found for: PREALBUMIN   VITAMIN B12   No results found for: XFBZSRXB12   VITAMIN D   No results found for: VITD25   PTH  No results found for: IPTH  VITAMIN B1/ THIAMINE   No results found for: [id number]   LIPID SCREEN (FASTING)         Lab Results   Component Value Date     HDL 36 09/10/2020   ,   HGA1C (T2DM ONLY)   No results found for: LABA1C, AVGG   TSH         Lab Results   Component Value Date     TSH 2.31 09/10/2020      IRON   No results found for: IRON   TIBC  No results found for: TIBC  FERRITIN  No results found for: FERRITIN  VITAMIN A  No results found for: RETINOL  NICOTINE  No results found for: NMET  UDS  No results found for: UDP  PSA  No results found for: LABPSA  GFR  Lab Results   Component Value Date     LABGLOM >60 09/10/2020      DEXA  Results for orders placed during the hospital encounter of 08/09/21     DEXA BONE DENSITY AXIAL SKELETON     Narrative  EXAM: DEXA BONE DENSITY AXIAL SKELETON     Bone Mineral Density     HISTORY: 61 years, Female, Pre-op testing, Post-menopausal, Morbid obesity with BMI of 45.0-49.9, adult ([facility] [address] Morbid obesity with BMI of 45.0-49.9, adult ([facility] [address] Malnutrition screen.     COMPARISON: There are no prior exams for comparison.     FINDINGS:  Bone densitometry has been performed using a Hologic bone densitometer. This evaluation includes the lumbar spine and both hips.     Current exam:     Lumbar Spine: L1-L4  Bone mineral density (gm/cm2): 1.094, T-score: 0.4, Z-score: 1.6, This qualifies as normal bone mineral density.     Femoral Neck Left:  Bone mineral density (gm/cm2): 0.844, T-score: -0.0, Z-score: 1.1, This qualifies as normal bone mineral density.     Femoral Neck Right:  Bone mineral density (gm/cm2): 0.859, T-score: 0.1, Z-score: 1.2, This qualifies as normal bone mineral density.     Total Hip Left:  Bone mineral density (gm/cm2): 0.993, T-score: 0.4, Z-score: 1.2, This qualifies as normal bone mineral density.     Total Hip Right:  Bone mineral density (gm/cm2): 0.946, T-score: 0.0, Z-score: 0.8, This qualifies as normal bone mineral density.     The patient does not qualify for a FRAX assessment because there is a T score at or above -1.0.     Impression  This patient has normal bone mineral density using the World Health Organization criteria. The patient is at a normal risk for fracture.     Recommendations:  Therapy recommendations need to be tailored to each individual patient.  Using the [facility]) FRAX absolute fracture algorithm, the National Osteoporosis Foundation recommends beginning pharmacological therapy in postmenopausal women  and men over the age of 48 with a 10 year probability of a hip fracture of >3% OR with the 10 year probability of a major osteoporotic fracture of >20%.    Please reconsider testing based on risk factors. Currently, Medicare will only reimburse for a central DEXA examination every two years, unless the patient is on chronic glucocorticoid therapy.        **This report has been created using voice recognition software. It may contain minor errors which are inherent in voice recognition technology. **     Final report electronically signed by Dr. Maria Silverman on 8/9/2021 3:33 PM         Patient Active Problem List   Diagnosis    Body mass index 45.0-49.9, adult ([facility] [address]    Depression    Gastroesophageal reflux disease    Hyperlipidemia    Anxiety    Fatty liver disease, nonalcoholic    Hypothyroidism    Hypertension    Diabetes mellitus type 2 in obese ([facility] [address]    Vitamin D deficiency    DIANNE on CPAP                                       An electronic signature was used to authenticate this note.     --Jackeline Long MD       ADDENDUM:  1. Schedule Maye for robotic sleeve gastrectomy. 2. She will undergo pre-operative clearance per anesthesia guidelines with risk factors listed under the past medical history diagnosis & problem list.  3. The risks, benefits and alternatives were discussed with Maye including non-operative management. The pros and cons of robotic, laparoscopic and open techniques were discussed. All questions answered. She understands and wishes to proceed with surgical intervention. 4. Restrictions discussed with [employer] and she expresses understanding. 5. She is advised to call back directly if there are further questions/concerns, or if her symptoms worsen prior to surgery.     Electronically signed by Jackeline Long MD on 3/24/22 at 6:39 AM EDT

## 2022-03-25 NOTE — PROGRESS NOTES
Called Dr. Genesis Gibbons office and spoke with [facility], requested copy of clearance

## 2022-03-28 ENCOUNTER — HOSPITAL ENCOUNTER (INPATIENT)
Age: 57
LOS: 2 days | Discharge: HOME HEALTH | DRG: 621 | End: 2022-03-30
Attending: SURGERY | Admitting: SURGERY
Payer: MEDICARE

## 2022-03-28 ENCOUNTER — ANESTHESIA EVENT (OUTPATIENT)
Dept: OPERATING ROOM | Age: 57
DRG: 621 | End: 2022-03-28
Payer: MEDICARE

## 2022-03-28 ENCOUNTER — ANESTHESIA (OUTPATIENT)
Dept: OPERATING ROOM | Age: 57
DRG: 621 | End: 2022-03-28
Payer: MEDICARE

## 2022-03-28 VITALS — SYSTOLIC BLOOD PRESSURE: 169 MMHG | DIASTOLIC BLOOD PRESSURE: 75 MMHG | OXYGEN SATURATION: 98 %

## 2022-03-28 PROBLEM — E66.9 OBESITY: Status: ACTIVE | Noted: 2022-03-28

## 2022-03-28 PROBLEM — E66.01 MORBID OBESITY (HCC): Status: ACTIVE | Noted: 2022-03-28

## 2022-03-28 LAB
EKG ATRIAL RATE: 60 BPM
EKG P AXIS: 59 DEGREES
EKG P-R INTERVAL: 204 MS
EKG Q-T INTERVAL: 412 MS
EKG QRS DURATION: 108 MS
EKG QTC CALCULATION (BAZETT): 412 MS
EKG R AXIS: 42 DEGREES
EKG T AXIS: 74 DEGREES
EKG VENTRICULAR RATE: 60 BPM
GLUCOSE BLD-MCNC: 113 MG/DL (ref 70–108)
GLUCOSE BLD-MCNC: 154 MG/DL (ref 70–108)
GLUCOSE BLD-MCNC: 164 MG/DL (ref 70–108)
GLUCOSE BLD-MCNC: 179 MG/DL (ref 70–108)
GLUCOSE BLD-MCNC: 191 MG/DL (ref 70–108)
HCT VFR BLD CALC: 38.7 % (ref 37–47)
HEMOGLOBIN: 12.5 GM/DL (ref 12–16)
POTASSIUM REFLEX MAGNESIUM: 3.8 MEQ/L (ref 3.5–5.2)

## 2022-03-28 PROCEDURE — 93005 ELECTROCARDIOGRAM TRACING: CPT | Performed by: SURGERY

## 2022-03-28 PROCEDURE — 36415 COLL VENOUS BLD VENIPUNCTURE: CPT

## 2022-03-28 PROCEDURE — 3700000000 HC ANESTHESIA ATTENDED CARE: Performed by: SURGERY

## 2022-03-28 PROCEDURE — 3600000009 HC SURGERY ROBOT BASE: Performed by: SURGERY

## 2022-03-28 PROCEDURE — 2500000003 HC RX 250 WO HCPCS

## 2022-03-28 PROCEDURE — 6360000002 HC RX W HCPCS: Performed by: SURGERY

## 2022-03-28 PROCEDURE — 82948 REAGENT STRIP/BLOOD GLUCOSE: CPT

## 2022-03-28 PROCEDURE — 2580000003 HC RX 258: Performed by: SURGERY

## 2022-03-28 PROCEDURE — 43775 LAP SLEEVE GASTRECTOMY: CPT | Performed by: SURGERY

## 2022-03-28 PROCEDURE — 93010 ELECTROCARDIOGRAM REPORT: CPT | Performed by: NUCLEAR MEDICINE

## 2022-03-28 PROCEDURE — 85014 HEMATOCRIT: CPT

## 2022-03-28 PROCEDURE — 6360000002 HC RX W HCPCS: Performed by: REGISTERED NURSE

## 2022-03-28 PROCEDURE — 1200000000 HC SEMI PRIVATE

## 2022-03-28 PROCEDURE — 6360000002 HC RX W HCPCS

## 2022-03-28 PROCEDURE — 7100000001 HC PACU RECOVERY - ADDTL 15 MIN: Performed by: SURGERY

## 2022-03-28 PROCEDURE — 2720000010 HC SURG SUPPLY STERILE: Performed by: SURGERY

## 2022-03-28 PROCEDURE — 3600000019 HC SURGERY ROBOT ADDTL 15MIN: Performed by: SURGERY

## 2022-03-28 PROCEDURE — 2500000003 HC RX 250 WO HCPCS: Performed by: REGISTERED NURSE

## 2022-03-28 PROCEDURE — 7100000000 HC PACU RECOVERY - FIRST 15 MIN: Performed by: SURGERY

## 2022-03-28 PROCEDURE — 88300 SURGICAL PATH GROSS: CPT

## 2022-03-28 PROCEDURE — 84132 ASSAY OF SERUM POTASSIUM: CPT

## 2022-03-28 PROCEDURE — 6370000000 HC RX 637 (ALT 250 FOR IP): Performed by: REGISTERED NURSE

## 2022-03-28 PROCEDURE — 6360000002 HC RX W HCPCS: Performed by: ANESTHESIOLOGY

## 2022-03-28 PROCEDURE — 2500000003 HC RX 250 WO HCPCS: Performed by: ANESTHESIOLOGY

## 2022-03-28 PROCEDURE — 0DB60Z3 EXCISION OF STOMACH, OPEN APPROACH, VERTICAL: ICD-10-PCS | Performed by: SURGERY

## 2022-03-28 PROCEDURE — 8E0W0CZ ROBOTIC ASSISTED PROCEDURE OF TRUNK REGION, OPEN APPROACH: ICD-10-PCS | Performed by: SURGERY

## 2022-03-28 PROCEDURE — 85018 HEMOGLOBIN: CPT

## 2022-03-28 PROCEDURE — 6370000000 HC RX 637 (ALT 250 FOR IP)

## 2022-03-28 PROCEDURE — 2709999900 HC NON-CHARGEABLE SUPPLY: Performed by: SURGERY

## 2022-03-28 PROCEDURE — 2500000003 HC RX 250 WO HCPCS: Performed by: SURGERY

## 2022-03-28 PROCEDURE — S2900 ROBOTIC SURGICAL SYSTEM: HCPCS | Performed by: SURGERY

## 2022-03-28 PROCEDURE — 3700000001 HC ADD 15 MINUTES (ANESTHESIA): Performed by: SURGERY

## 2022-03-28 RX ORDER — DEXTROSE MONOHYDRATE 50 MG/ML
100 INJECTION, SOLUTION INTRAVENOUS PRN
Status: DISCONTINUED | OUTPATIENT
Start: 2022-03-28 | End: 2022-03-30 | Stop reason: HOSPADM

## 2022-03-28 RX ORDER — SODIUM CHLORIDE 9 MG/ML
25 INJECTION, SOLUTION INTRAVENOUS PRN
Status: DISCONTINUED | OUTPATIENT
Start: 2022-03-28 | End: 2022-03-28 | Stop reason: HOSPADM

## 2022-03-28 RX ORDER — SCOLOPAMINE TRANSDERMAL SYSTEM 1 MG/1
1 PATCH, EXTENDED RELEASE TRANSDERMAL
Status: DISCONTINUED | OUTPATIENT
Start: 2022-03-28 | End: 2022-03-28

## 2022-03-28 RX ORDER — SODIUM CHLORIDE 0.9 % (FLUSH) 0.9 %
5-40 SYRINGE (ML) INJECTION EVERY 12 HOURS SCHEDULED
Status: DISCONTINUED | OUTPATIENT
Start: 2022-03-28 | End: 2022-03-28 | Stop reason: HOSPADM

## 2022-03-28 RX ORDER — SODIUM CHLORIDE 0.9 % (FLUSH) 0.9 %
5-40 SYRINGE (ML) INJECTION PRN
Status: DISCONTINUED | OUTPATIENT
Start: 2022-03-28 | End: 2022-03-28 | Stop reason: HOSPADM

## 2022-03-28 RX ORDER — MORPHINE SULFATE 4 MG/ML
4 INJECTION, SOLUTION INTRAMUSCULAR; INTRAVENOUS
Status: DISCONTINUED | OUTPATIENT
Start: 2022-03-28 | End: 2022-03-30 | Stop reason: HOSPADM

## 2022-03-28 RX ORDER — EPHEDRINE SULFATE/0.9% NACL/PF 50 MG/5 ML
SYRINGE (ML) INTRAVENOUS PRN
Status: DISCONTINUED | OUTPATIENT
Start: 2022-03-28 | End: 2022-03-28 | Stop reason: SDUPTHER

## 2022-03-28 RX ORDER — SODIUM CHLORIDE 9 MG/ML
INJECTION, SOLUTION INTRAVENOUS CONTINUOUS
Status: DISCONTINUED | OUTPATIENT
Start: 2022-03-28 | End: 2022-03-30 | Stop reason: HOSPADM

## 2022-03-28 RX ORDER — HYOSCYAMINE SULFATE 0.125 MG
125 TABLET,DISINTEGRATING ORAL EVERY 4 HOURS PRN
Status: DISCONTINUED | OUTPATIENT
Start: 2022-03-28 | End: 2022-03-30 | Stop reason: HOSPADM

## 2022-03-28 RX ORDER — FENTANYL CITRATE 50 UG/ML
50 INJECTION, SOLUTION INTRAMUSCULAR; INTRAVENOUS EVERY 5 MIN PRN
Status: COMPLETED | OUTPATIENT
Start: 2022-03-28 | End: 2022-03-28

## 2022-03-28 RX ORDER — SUCCINYLCHOLINE/SOD CL,ISO/PF 200MG/10ML
SYRINGE (ML) INTRAVENOUS PRN
Status: DISCONTINUED | OUTPATIENT
Start: 2022-03-28 | End: 2022-03-28 | Stop reason: SDUPTHER

## 2022-03-28 RX ORDER — LABETALOL 20 MG/4 ML (5 MG/ML) INTRAVENOUS SYRINGE
5 EVERY 10 MIN PRN
Status: DISCONTINUED | OUTPATIENT
Start: 2022-03-28 | End: 2022-03-28 | Stop reason: HOSPADM

## 2022-03-28 RX ORDER — PROPOFOL 10 MG/ML
INJECTION, EMULSION INTRAVENOUS PRN
Status: DISCONTINUED | OUTPATIENT
Start: 2022-03-28 | End: 2022-03-28 | Stop reason: SDUPTHER

## 2022-03-28 RX ORDER — KETOROLAC TROMETHAMINE 30 MG/ML
15 INJECTION, SOLUTION INTRAMUSCULAR; INTRAVENOUS EVERY 6 HOURS
Status: DISCONTINUED | OUTPATIENT
Start: 2022-03-28 | End: 2022-03-30 | Stop reason: HOSPADM

## 2022-03-28 RX ORDER — ONDANSETRON 2 MG/ML
INJECTION INTRAMUSCULAR; INTRAVENOUS PRN
Status: DISCONTINUED | OUTPATIENT
Start: 2022-03-28 | End: 2022-03-28 | Stop reason: SDUPTHER

## 2022-03-28 RX ORDER — GLYCOPYRROLATE 1 MG/5 ML
SYRINGE (ML) INTRAVENOUS PRN
Status: DISCONTINUED | OUTPATIENT
Start: 2022-03-28 | End: 2022-03-28 | Stop reason: SDUPTHER

## 2022-03-28 RX ORDER — SCOLOPAMINE TRANSDERMAL SYSTEM 1 MG/1
1 PATCH, EXTENDED RELEASE TRANSDERMAL
Status: DISCONTINUED | OUTPATIENT
Start: 2022-03-28 | End: 2022-03-30 | Stop reason: HOSPADM

## 2022-03-28 RX ORDER — BUPIVACAINE HYDROCHLORIDE AND EPINEPHRINE 5; 5 MG/ML; UG/ML
INJECTION, SOLUTION EPIDURAL; INTRACAUDAL; PERINEURAL PRN
Status: DISCONTINUED | OUTPATIENT
Start: 2022-03-28 | End: 2022-03-28 | Stop reason: ALTCHOICE

## 2022-03-28 RX ORDER — PROMETHAZINE HYDROCHLORIDE 25 MG/1
25 SUPPOSITORY RECTAL EVERY 6 HOURS PRN
Status: DISCONTINUED | OUTPATIENT
Start: 2022-03-28 | End: 2022-03-30 | Stop reason: HOSPADM

## 2022-03-28 RX ORDER — NICOTINE POLACRILEX 4 MG
15 LOZENGE BUCCAL PRN
Status: DISCONTINUED | OUTPATIENT
Start: 2022-03-28 | End: 2022-03-30 | Stop reason: HOSPADM

## 2022-03-28 RX ORDER — MIDAZOLAM HYDROCHLORIDE 1 MG/ML
INJECTION INTRAMUSCULAR; INTRAVENOUS PRN
Status: DISCONTINUED | OUTPATIENT
Start: 2022-03-28 | End: 2022-03-28 | Stop reason: SDUPTHER

## 2022-03-28 RX ORDER — LABETALOL 20 MG/4 ML (5 MG/ML) INTRAVENOUS SYRINGE
Status: COMPLETED
Start: 2022-03-28 | End: 2022-03-28

## 2022-03-28 RX ORDER — ONDANSETRON 2 MG/ML
4 INJECTION INTRAMUSCULAR; INTRAVENOUS EVERY 6 HOURS
Status: DISCONTINUED | OUTPATIENT
Start: 2022-03-28 | End: 2022-03-30 | Stop reason: HOSPADM

## 2022-03-28 RX ORDER — MEPERIDINE HYDROCHLORIDE 25 MG/ML
12.5 INJECTION INTRAMUSCULAR; INTRAVENOUS; SUBCUTANEOUS EVERY 5 MIN PRN
Status: DISCONTINUED | OUTPATIENT
Start: 2022-03-28 | End: 2022-03-28 | Stop reason: HOSPADM

## 2022-03-28 RX ORDER — DEXAMETHASONE SODIUM PHOSPHATE 10 MG/ML
INJECTION, EMULSION INTRAMUSCULAR; INTRAVENOUS PRN
Status: DISCONTINUED | OUTPATIENT
Start: 2022-03-28 | End: 2022-03-28 | Stop reason: SDUPTHER

## 2022-03-28 RX ORDER — FENTANYL CITRATE 50 UG/ML
INJECTION, SOLUTION INTRAMUSCULAR; INTRAVENOUS PRN
Status: DISCONTINUED | OUTPATIENT
Start: 2022-03-28 | End: 2022-03-28 | Stop reason: SDUPTHER

## 2022-03-28 RX ORDER — METOCLOPRAMIDE HYDROCHLORIDE 5 MG/ML
10 INJECTION INTRAMUSCULAR; INTRAVENOUS EVERY 6 HOURS PRN
Status: DISCONTINUED | OUTPATIENT
Start: 2022-03-28 | End: 2022-03-30 | Stop reason: HOSPADM

## 2022-03-28 RX ORDER — ONDANSETRON 2 MG/ML
4 INJECTION INTRAMUSCULAR; INTRAVENOUS ONCE
Status: COMPLETED | OUTPATIENT
Start: 2022-03-28 | End: 2022-03-28

## 2022-03-28 RX ORDER — SODIUM CHLORIDE 0.9 % (FLUSH) 0.9 %
10 SYRINGE (ML) INJECTION PRN
Status: DISCONTINUED | OUTPATIENT
Start: 2022-03-28 | End: 2022-03-30 | Stop reason: HOSPADM

## 2022-03-28 RX ORDER — SODIUM CHLORIDE 0.9 % (FLUSH) 0.9 %
10 SYRINGE (ML) INJECTION EVERY 12 HOURS SCHEDULED
Status: DISCONTINUED | OUTPATIENT
Start: 2022-03-28 | End: 2022-03-30 | Stop reason: HOSPADM

## 2022-03-28 RX ORDER — LIDOCAINE HYDROCHLORIDE 40 MG/ML
SOLUTION TOPICAL PRN
Status: DISCONTINUED | OUTPATIENT
Start: 2022-03-28 | End: 2022-03-28 | Stop reason: SDUPTHER

## 2022-03-28 RX ORDER — DEXTROSE MONOHYDRATE 25 G/50ML
12.5 INJECTION, SOLUTION INTRAVENOUS PRN
Status: DISCONTINUED | OUTPATIENT
Start: 2022-03-28 | End: 2022-03-28

## 2022-03-28 RX ORDER — SODIUM CHLORIDE 9 MG/ML
25 INJECTION, SOLUTION INTRAVENOUS PRN
Status: DISCONTINUED | OUTPATIENT
Start: 2022-03-28 | End: 2022-03-30 | Stop reason: HOSPADM

## 2022-03-28 RX ORDER — ROCURONIUM BROMIDE 10 MG/ML
INJECTION, SOLUTION INTRAVENOUS PRN
Status: DISCONTINUED | OUTPATIENT
Start: 2022-03-28 | End: 2022-03-28 | Stop reason: SDUPTHER

## 2022-03-28 RX ORDER — 0.9 % SODIUM CHLORIDE 0.9 %
500 INTRAVENOUS SOLUTION INTRAVENOUS ONCE
Status: DISCONTINUED | OUTPATIENT
Start: 2022-03-28 | End: 2022-03-30 | Stop reason: HOSPADM

## 2022-03-28 RX ORDER — PROMETHAZINE HYDROCHLORIDE 25 MG/ML
6.25 INJECTION, SOLUTION INTRAMUSCULAR; INTRAVENOUS EVERY 6 HOURS PRN
Status: DISCONTINUED | OUTPATIENT
Start: 2022-03-28 | End: 2022-03-30 | Stop reason: HOSPADM

## 2022-03-28 RX ORDER — MORPHINE SULFATE 2 MG/ML
2 INJECTION, SOLUTION INTRAMUSCULAR; INTRAVENOUS
Status: DISCONTINUED | OUTPATIENT
Start: 2022-03-28 | End: 2022-03-30 | Stop reason: HOSPADM

## 2022-03-28 RX ORDER — NEOSTIGMINE METHYLSULFATE 5 MG/5 ML
SYRINGE (ML) INTRAVENOUS PRN
Status: DISCONTINUED | OUTPATIENT
Start: 2022-03-28 | End: 2022-03-28 | Stop reason: SDUPTHER

## 2022-03-28 RX ORDER — ONDANSETRON 2 MG/ML
4 INJECTION INTRAMUSCULAR; INTRAVENOUS
Status: DISCONTINUED | OUTPATIENT
Start: 2022-03-28 | End: 2022-03-28 | Stop reason: HOSPADM

## 2022-03-28 RX ADMIN — LABETALOL 20 MG/4 ML (5 MG/ML) INTRAVENOUS SYRINGE 5 MG: at 11:35

## 2022-03-28 RX ADMIN — CEFOXITIN 2000 MG: 2 INJECTION, POWDER, FOR SOLUTION INTRAVENOUS at 09:56

## 2022-03-28 RX ADMIN — ONDANSETRON 4 MG: 2 INJECTION INTRAMUSCULAR; INTRAVENOUS at 08:17

## 2022-03-28 RX ADMIN — Medication 4 MG: at 10:53

## 2022-03-28 RX ADMIN — MIDAZOLAM 2 MG: 1 INJECTION INTRAMUSCULAR; INTRAVENOUS at 09:42

## 2022-03-28 RX ADMIN — Medication 0.8 MG: at 10:53

## 2022-03-28 RX ADMIN — FENTANYL CITRATE 100 MCG: 50 INJECTION, SOLUTION INTRAMUSCULAR; INTRAVENOUS at 09:42

## 2022-03-28 RX ADMIN — FENTANYL CITRATE 25 MCG: 50 INJECTION, SOLUTION INTRAMUSCULAR; INTRAVENOUS at 09:38

## 2022-03-28 RX ADMIN — KETOROLAC TROMETHAMINE 15 MG: 30 INJECTION, SOLUTION INTRAMUSCULAR; INTRAVENOUS at 19:02

## 2022-03-28 RX ADMIN — Medication 100 MG: at 09:45

## 2022-03-28 RX ADMIN — HYDROMORPHONE HYDROCHLORIDE 0.5 MG: 1 INJECTION, SOLUTION INTRAMUSCULAR; INTRAVENOUS; SUBCUTANEOUS at 11:38

## 2022-03-28 RX ADMIN — DEXAMETHASONE SODIUM PHOSPHATE 10 MG: 10 INJECTION, EMULSION INTRAMUSCULAR; INTRAVENOUS at 10:35

## 2022-03-28 RX ADMIN — HYDROMORPHONE HYDROCHLORIDE 0.5 MG: 1 INJECTION, SOLUTION INTRAMUSCULAR; INTRAVENOUS; SUBCUTANEOUS at 11:54

## 2022-03-28 RX ADMIN — PROPOFOL 200 MG: 10 INJECTION, EMULSION INTRAVENOUS at 09:45

## 2022-03-28 RX ADMIN — HYDROMORPHONE HYDROCHLORIDE 0.5 MG: 1 INJECTION, SOLUTION INTRAMUSCULAR; INTRAVENOUS; SUBCUTANEOUS at 11:59

## 2022-03-28 RX ADMIN — ONDANSETRON 4 MG: 2 INJECTION INTRAMUSCULAR; INTRAVENOUS at 10:55

## 2022-03-28 RX ADMIN — SODIUM CHLORIDE: 9 INJECTION, SOLUTION INTRAVENOUS at 10:25

## 2022-03-28 RX ADMIN — DEXAMETHASONE SODIUM PHOSPHATE 10 MG: 10 INJECTION, EMULSION INTRAMUSCULAR; INTRAVENOUS at 09:45

## 2022-03-28 RX ADMIN — FENTANYL CITRATE 50 MCG: 50 INJECTION, SOLUTION INTRAMUSCULAR; INTRAVENOUS at 11:15

## 2022-03-28 RX ADMIN — FENTANYL CITRATE 50 MCG: 50 INJECTION, SOLUTION INTRAMUSCULAR; INTRAVENOUS at 11:28

## 2022-03-28 RX ADMIN — LIDOCAINE HYDROCHLORIDE 4 ML: 40 SOLUTION TOPICAL at 09:45

## 2022-03-28 RX ADMIN — LABETALOL 20 MG/4 ML (5 MG/ML) INTRAVENOUS SYRINGE 5 MG: at 11:50

## 2022-03-28 RX ADMIN — HYDROMORPHONE HYDROCHLORIDE 0.5 MG: 1 INJECTION, SOLUTION INTRAMUSCULAR; INTRAVENOUS; SUBCUTANEOUS at 11:45

## 2022-03-28 RX ADMIN — ROCURONIUM BROMIDE 50 MG: 50 INJECTION INTRAVENOUS at 09:54

## 2022-03-28 RX ADMIN — Medication 25 MG: at 09:54

## 2022-03-28 RX ADMIN — SODIUM CHLORIDE: 9 INJECTION, SOLUTION INTRAVENOUS at 13:49

## 2022-03-28 RX ADMIN — FENTANYL CITRATE 50 MCG: 50 INJECTION, SOLUTION INTRAMUSCULAR; INTRAVENOUS at 11:22

## 2022-03-28 RX ADMIN — FENTANYL CITRATE 25 MCG: 50 INJECTION, SOLUTION INTRAMUSCULAR; INTRAVENOUS at 10:55

## 2022-03-28 RX ADMIN — ONDANSETRON 4 MG: 2 INJECTION INTRAMUSCULAR; INTRAVENOUS at 23:27

## 2022-03-28 RX ADMIN — KETOROLAC TROMETHAMINE 15 MG: 30 INJECTION, SOLUTION INTRAMUSCULAR; INTRAVENOUS at 13:49

## 2022-03-28 RX ADMIN — LABETALOL 20 MG/4 ML (5 MG/ML) INTRAVENOUS SYRINGE 5 MG: at 12:02

## 2022-03-28 RX ADMIN — Medication 15 MG: at 10:21

## 2022-03-28 RX ADMIN — FAMOTIDINE 20 MG: 10 INJECTION, SOLUTION INTRAVENOUS at 08:17

## 2022-03-28 RX ADMIN — SODIUM CHLORIDE: 9 INJECTION, SOLUTION INTRAVENOUS at 08:18

## 2022-03-28 RX ADMIN — Medication 200 MG: at 09:45

## 2022-03-28 RX ADMIN — Medication 10 MG: at 09:56

## 2022-03-28 ASSESSMENT — PULMONARY FUNCTION TESTS
PIF_VALUE: 26
PIF_VALUE: 16
PIF_VALUE: 17
PIF_VALUE: 2
PIF_VALUE: 22
PIF_VALUE: 2
PIF_VALUE: 2
PIF_VALUE: 24
PIF_VALUE: 22
PIF_VALUE: 22
PIF_VALUE: 18
PIF_VALUE: 17
PIF_VALUE: 17
PIF_VALUE: 22
PIF_VALUE: 17
PIF_VALUE: 23
PIF_VALUE: 2
PIF_VALUE: 20
PIF_VALUE: 22
PIF_VALUE: 16
PIF_VALUE: 4
PIF_VALUE: 21
PIF_VALUE: 23
PIF_VALUE: 22
PIF_VALUE: 19
PIF_VALUE: 2
PIF_VALUE: 23
PIF_VALUE: 22
PIF_VALUE: 1
PIF_VALUE: 17
PIF_VALUE: 22
PIF_VALUE: 22
PIF_VALUE: 2
PIF_VALUE: 23
PIF_VALUE: 23
PIF_VALUE: 22
PIF_VALUE: 22
PIF_VALUE: 2
PIF_VALUE: 19
PIF_VALUE: 22
PIF_VALUE: 16
PIF_VALUE: 19
PIF_VALUE: 17
PIF_VALUE: 17
PIF_VALUE: 16
PIF_VALUE: 22
PIF_VALUE: 26
PIF_VALUE: 23
PIF_VALUE: 21
PIF_VALUE: 22
PIF_VALUE: 3
PIF_VALUE: 23
PIF_VALUE: 17
PIF_VALUE: 22
PIF_VALUE: 1
PIF_VALUE: 22
PIF_VALUE: 21
PIF_VALUE: 22
PIF_VALUE: 19
PIF_VALUE: 19
PIF_VALUE: 2
PIF_VALUE: 23
PIF_VALUE: 2
PIF_VALUE: 1
PIF_VALUE: 22
PIF_VALUE: 22
PIF_VALUE: 23
PIF_VALUE: 19
PIF_VALUE: 22
PIF_VALUE: 1
PIF_VALUE: 21
PIF_VALUE: 2
PIF_VALUE: 22
PIF_VALUE: 0
PIF_VALUE: 22
PIF_VALUE: 0
PIF_VALUE: 20
PIF_VALUE: 23
PIF_VALUE: 17
PIF_VALUE: 21
PIF_VALUE: 24
PIF_VALUE: 22
PIF_VALUE: 2
PIF_VALUE: 22
PIF_VALUE: 19
PIF_VALUE: 19
PIF_VALUE: 21

## 2022-03-28 ASSESSMENT — PAIN SCALES - GENERAL
PAINLEVEL_OUTOF10: 7
PAINLEVEL_OUTOF10: 5
PAINLEVEL_OUTOF10: 6
PAINLEVEL_OUTOF10: 8
PAINLEVEL_OUTOF10: 6
PAINLEVEL_OUTOF10: 7
PAINLEVEL_OUTOF10: 8
PAINLEVEL_OUTOF10: 8
PAINLEVEL_OUTOF10: 7
PAINLEVEL_OUTOF10: 6
PAINLEVEL_OUTOF10: 8
PAINLEVEL_OUTOF10: 7
PAINLEVEL_OUTOF10: 7
PAINLEVEL_OUTOF10: 8

## 2022-03-28 ASSESSMENT — PAIN DESCRIPTION - FREQUENCY
FREQUENCY: CONTINUOUS
FREQUENCY: CONTINUOUS

## 2022-03-28 ASSESSMENT — PAIN DESCRIPTION - DESCRIPTORS
DESCRIPTORS: HEAVINESS
DESCRIPTORS: ACHING

## 2022-03-28 ASSESSMENT — PAIN DESCRIPTION - PROGRESSION
CLINICAL_PROGRESSION: NOT CHANGED

## 2022-03-28 ASSESSMENT — PAIN DESCRIPTION - PAIN TYPE
TYPE: SURGICAL PAIN

## 2022-03-28 ASSESSMENT — PAIN DESCRIPTION - ORIENTATION
ORIENTATION: UPPER
ORIENTATION: MID

## 2022-03-28 ASSESSMENT — PAIN DESCRIPTION - ONSET: ONSET: ON-GOING

## 2022-03-28 ASSESSMENT — PAIN DESCRIPTION - LOCATION
LOCATION: ABDOMEN
LOCATION: ABDOMEN;CHEST;SHOULDER

## 2022-03-28 ASSESSMENT — PAIN - FUNCTIONAL ASSESSMENT
PAIN_FUNCTIONAL_ASSESSMENT: ACTIVITIES ARE NOT PREVENTED
PAIN_FUNCTIONAL_ASSESSMENT: 0-10

## 2022-03-28 NOTE — PROGRESS NOTES
1115: Pt arrives to pacu, awakens to verbal stimuli. Pt on 8L simple mask, respirations easy and unlabored. VSS  1122: Pt c/o pain 8/10, 50 mcg of fentanyl administered. 1128: No change in pain, 50 mcg of fentanyl given. 1135: 5mg of labetalol administered for elevated bp  1138: Pt states pain 7/10, 0.5mg of dilaudid given. 1140: Blood glucose 164  1145: No change in pain, 0.5mg of dilaudid administered. 1150: Bp continues to be elevated, 5mg of labetalol given. VSS  1154: Pt states pain 8/10, 0.5mg of dilaudid given. 1159: No change, 0.5mg of dilaudid given. 1200: Provided report to [facility] on [address] [place] [address]  1202: BP elevated, 5mg of labetalol administered. 1210: Pt sleeping at this time, easily awakens to voice. VSS  1222: Pt meets criteria for discharge from pacu. 1232: Pt transported to [address] [place] [address] in stable condition.

## 2022-03-28 NOTE — OP NOTE
Operative Note                              [id number] [id number]                         Operative Report      Pt Name: Tyler Lemus  MRN: [id number]  YOB: 1965  Surgeon: Milton Yung MD FACS  Primary Care Physician: ALESSIA Martinez NP  Procedure Date: 3/28/2022     PREOPERATIVE DIAGNOSES:  1.  Morbid obesity  2. BMI 41  3. DM     POSTOPERATIVE DIAGNOSES: Same      PROCEDURE:  Robotic sleeve gastrectomy. SURGEON:  Milton Yung M.D. FACS     ASSISTANT:  Yadi Petit. [facility]     ANESTHESIA:  General/local.     ESTIMATED BLOOD LOSS:  10ml     DRAINS:  None     COMPLICATIONS:  None     DISPOSITION:  Stable to the recovery room     SPECIMEN:  Stomach. INDICATIONS FOR PROCEDURE:  The patient is a 61 y.o.-old female who had  been seen in the weight management program secondary to her morbid obesity. She was not able to lose enough adequate excess body weight with medical  management only, and wished to proceed with a robotic sleeve gastrectomy. Risks of surgery were then further discussed. Some of the risks included  but were not limited to bleeding, infection, the need for reoperation,  severe chronic postoperative pain or numbness, major vascular or nerve  injury, cardiopulmonary complications, anesthetic complications,  seroma/hematoma formation, wound breakdown, trocar site herniation, staple  line breakdown/leak, staple line bleed, sleeve stricture, chronic nausea, chronic  pain, and death. After all of the questions were answered in their  entirety and the patient was completely aware of the current situation, she  elected to proceed with the procedure. DESCRIPTION OF THE PROCEDURE:  After informed consent was signed and placed  on the chart, the patient was taken back to the operating room and placed  supine on the operating room table. General anesthesia was induced. She  tolerated this well throughout the case. All pressure points were padded.  She was on preoperative antibiotics. Bilateral lower extremity sequential  compression devices were placed prior to incision. Her abdomen and pelvis  were prepped and draped in the usual sterile standard fashion. A time-out  occurred prior to the operation, which not only identified the patient but  also the planned procedure to be performed. At the end of the time-out,  there were no questions or concerns. I began the operation first by making a small transverse incision just  above and to the right of the umbilicus. Using low-flow insufflation and  the OptiView, the abdomen was entered into without difficulty. The  intra-abdominal cavity was insufflated to a pressure of approximately 15  mmHg with carbon dioxide gas. The patient tolerated the insufflation well. Three separate 8-mm trocars were placed in their standard location under  direct vision. A 5-mm trocar was placed in the far right lateral abdominal  region under direct vision. Liver retractor was brought in and placed  without difficult under direct vision. The 11 mm was then replaced by  12-mm robotic trocar for the stapler and specimen extraction. The patient was then  placed in reverse Trendelenburg. Robot brought in and docked. Instruments  were placed under direct vision. Once everything was aligned and in order,  I then unscrubbed and went back to the console. I began the operation by  first by taking down a fair amount of adhesions off the anterior abdominal wall on the right side secondary to a history of a open gallbladder removal.  This was mainly omentum and some transverse colon. Hemostasis adequate. At that time I then proceeded with evaluating the hiatus. There appeared to be no significant hiatal  hernia. The lesser sac was entered into using the vessel sealer going  through the mesentery there on the greater curvature of the stomach. Mesentery here was then unzipped distally to about 5 to 6 cm proximal from the  pylorus.   It was then unzipped proximal up and around the fundus to the  left segundo. Short gastric vessels were taken down. Spleen preserved. Hemostasis adequate. Here, it was confirmed there was no significant  hiatal hernia. After it was assured that the fundus and the upper stomach  was completely mobilized, the bougie 40-Japanese was then placed under direct  vision and positioned with the help of anesthesia. This was then placed to suction. First firing was  a 61 robotic black reinforced SEAMGUARD cartridge. Care was taken to hug the bougie  but also not to stricture down the angle of the stomach. There were several firings of the 60-mm robotic green reinforced SEAMGUARD  cartridges. All of these hugged the bougie. Last firing was a 60 mm robotic green reinforced SEAMGUARD  cartridge. Here, care was taken to ensure taking all of  the excess fundus, but also not to stricture down or encroach on the GE  junction. Staple line was then tested under irrigation with air  insufflation through the bougie and clamped distally. No air bubbles or  any kind of concern for leak was identified. Irrigation was then removed. Bougie taken off of suction and removed under direct visualization. 0 Ethibond suture was then placed through the  gastric remnant and brought up to the 12-mm trocar site. Instruments were  removed. Robot undocked. I then scrubbed back into the case. Gastric  remnant was removed and sent to Pathology for permanent. Using a Storz  suture passer and 0 Vicryl suture, this was placed through the fascia there  around the 12-mm trocar site. This was tied and at the completion of this,  there were no fascial defects. Liver retractor removed. Other trocars  were then removed under direct vision. Hemostasis was adequate. The  patient tolerated desufflation well. Vicryl suture 4-0 was used in a  subcuticular fashion in all the open incisions for skin closure.   Closed  incisions were then cleaned, dried, and Steri-Strips applied. Marcaine  0.5% with epinephrine was used for local anesthetic. The patient tolerated  the injection of local anesthetic without difficulty. Sponge, needle and  instrumentation counts were correct at the end of the procedure. The  patient tolerated the procedure well with no apparent complications and  only about 10 mL of blood loss. She was later brought out of  general anesthesia and then transferred to postanesthesia care unit in  stable condition. Tan Murguia M.D.  FACS  Electronically signed 3/28/2022 at 11:02 AM

## 2022-03-28 NOTE — PROGRESS NOTES
Pt admitted to [facility] room 15 and oriented to unit. SCD sleeves applied. Nares swabbed. Pt verbalized permission for first name, last initial and physicians name on white board. SDS board and discharge criteria explained, pt and family verbalized understanding. Pt denies thoughts of harming self or others. Call light in reach. Family at the bedside.

## 2022-03-28 NOTE — ANESTHESIA POSTPROCEDURE EVALUATION
Department of Anesthesiology  Postprocedure Note    Patient: Rhonda Levy  MRN: [id number]  YOB: 1965  Date of evaluation: 3/28/2022  Time:  12:42 PM     Procedure Summary     Date: 03/28/22 Room / Location: [facility] / Swetha Ingelver    Anesthesia Start: 7092 Anesthesia Stop: 7040    Procedure: ROBOTIC GASTRECTOMY SLEEVE (N/A Abdomen) Diagnosis: (OBESITY)    Surgeons: Klarissa Almanza MD Responsible Provider: Lala Rueda DO    Anesthesia Type: general ASA Status: 3          Anesthesia Type: general    Zulma Phase I: Zulma Score: 8    Zulma Phase II:      Last vitals: Reviewed and per EMR flowsheets.        Anesthesia Post Evaluation    Patient location during evaluation: PACU  Patient participation: complete - patient participated  Level of consciousness: awake and alert  Pain score: 3  Airway patency: patent  Nausea & Vomiting: no nausea and no vomiting  Complications: no  Cardiovascular status: hemodynamically stable and blood pressure returned to baseline  Respiratory status: spontaneous ventilation, room air and acceptable  Hydration status: stable

## 2022-03-28 NOTE — ANESTHESIA PRE PROCEDURE
Department of Anesthesiology  Preprocedure Note       Name:  Deirdre Stark   Age:  61 y.o.  :  1965                                          MRN:  [id number]         Date:  3/28/2022      Surgeon: Joe Espinoza):  Elissa Garza MD    Procedure: Procedure(s):  ROBOTIC GASTRECTOMY SLEEVE    Medications prior to admission:   Prior to Admission medications    Medication Sig Start Date End Date Taking?  Authorizing Provider   vitamin D3 (CHOLECALCIFEROL) 10 MCG (400 UNIT) TABS tablet Take 1 tablet by mouth daily 3/1/22  Yes Historical Provider, MD   Docusate Sodium 100 MG TABS Take 100 mg by mouth 2 times daily Take as needed to prevent constipation 3/28/22   Elissa Garza MD   enoxaparin (LOVENOX) 40 MG/0.4ML injection Inject 0.4 mLs into the skin daily for 14 days 3/28/22 4/11/22  Elissa Garza MD   metoclopramide (REGLAN) 10 MG tablet Take 1 tablet by mouth every 6 hours as needed (nausea/vomiting) 3/28/22 4/11/22  Elissa Garza MD   ondansetron (ZOFRAN) 4 MG tablet Take 1 tablet by mouth every 4 hours as needed for Nausea or Vomiting 3/28/22 4/11/22  Elissa Garza MD   omeprazole (PRILOSEC) 40 MG delayed release capsule Take 1 capsule by mouth every morning (before breakfast) Open capsule post-op 3/28/22   Elissa Garza MD   fluticasone-vilanterol (BREO ELLIPTA) 100-25 MCG/INH AEPB inhaler Inhale 1 puff into the lungs daily as needed     Historical Provider, MD DELCID PO Take 2 tablets by mouth daily    Historical Provider, MD   metFORMIN (GLUCOPHAGE) 1000 MG tablet TAKE 1 TABLET BY MOUTH TWICE DAILY WITH MORNING AND EVENING MEALS 21   Historical Provider, MD   hydrocortisone (ANUSOL-HC) 2.5 % CREA rectal cream Place rectally 2 times daily as needed  21   Historical Provider, MD   SITagliptin (JANUVIA) 100 MG tablet Take 100 mg by mouth daily    Historical Provider, MD   losartan (COZAAR) 100 MG tablet Take 100 mg by mouth daily    Historical Provider, MD   Insulin Allergen Reactions    Ampicillin Hives    Motrin [Ibuprofen] Hives     Brand Name Motrin; GENERICS ARE OK       Problem List:    Patient Active Problem List   Diagnosis Code    Body mass index 45.0-49.9, adult ([facility] Z68.42    Depression F32. A    Gastroesophageal reflux disease K21.9    Hyperlipidemia E78.5    Anxiety F41.9    Fatty liver disease, nonalcoholic E08.2    Hypothyroidism E03.9    Hypertension I10    Diabetes mellitus type 2 in obese (HCC) E11.69, E66.9    Vitamin D deficiency E55.9    DIANNE on CPAP G47.33, Z99.89       Past Medical History:        Diagnosis Date    Body mass index 45.0-49.9, adult ([facility] 2022    Cancer (HCC)     skin basal cell    Depression     GERD (gastroesophageal reflux disease)     History of palpitations     Hyperlipidemia     Hypertension     Hypothyroidism     Irritable bowel syndrome     DIANNE on CPAP     Osteoarthritis     BACK AND KNEES    PONV (postoperative nausea and vomiting)     Prolonged emergence from general anesthesia     Type II or unspecified type diabetes mellitus without mention of complication, not stated as uncontrolled        Past Surgical History:        Procedure Laterality Date    CHOLECYSTECTOMY      COLONOSCOPY  longer than 3 yr ago    [facility]    ENDOSCOPY, COLON, DIAGNOSTIC      KNEE ARTHROSCOPY Left     x2    OTHER SURGICAL HISTORY      knee ablation     TUBAL LIGATION         Social History:    Social History     Tobacco Use    Smoking status: Former Smoker     Packs/day: 2.00     Types: Cigarettes     Quit date: 4/15/1991     Years since quittin.9    Smokeless tobacco: Never Used   Substance Use Topics    Alcohol use: Not Currently                                Counseling given: Not Answered      Vital Signs (Current):   Vitals:    22 1031 22 0741   BP:  118/68   Pulse:  80   Resp:  20   Temp:  97.5 °F (36.4 °C)   TempSrc:  Tympanic   SpO2:  97%   Weight: 278 lb (126.1 kg) 270 lb 6.4 oz (122.7 kg)   Height: 5' 8\" (1.727 m)                                               BP Readings from Last 3 Encounters:   03/28/22 118/68   01/21/22 114/70   12/15/21 110/60       NPO Status: Time of last liquid consumption: 0500                        Time of last solid consumption: 0830                        Date of last liquid consumption: 03/28/22                        Date of last solid food consumption: 03/27/22    BMI:   Wt Readings from Last 3 Encounters:   03/28/22 270 lb 6.4 oz (122.7 kg)   02/28/22 278 lb (126.1 kg)   01/21/22 285 lb 6.4 oz (129.5 kg)     Body mass index is 41.11 kg/m². CBC:   Lab Results   Component Value Date    WBC 7.7 09/10/2020    RBC 4.41 09/10/2020    HGB 13.1 09/10/2020    HCT 41.4 09/10/2020    MCV 93.9 09/10/2020    RDW 13.8 09/10/2020     09/10/2020       CMP:   Lab Results   Component Value Date     09/10/2020    K 4.2 09/10/2020     09/10/2020    CO2 20 09/10/2020    BUN 9 09/10/2020    CREATININE 0.54 09/10/2020    GFRAA >60 09/10/2020    LABGLOM >60 09/10/2020    GLUCOSE 142 09/10/2020    PROT 7.5 09/10/2020    CALCIUM 10.2 09/10/2020    BILITOT 0.39 09/10/2020    ALKPHOS 73 09/10/2020    AST 50 09/10/2020    ALT 48 09/10/2020       POC Tests: No results for input(s): POCGLU, POCNA, POCK, POCCL, POCBUN, POCHEMO, POCHCT in the last 72 hours. Coags: No results found for: PROTIME, INR, APTT    HCG (If Applicable): No results found for: PREGTESTUR, PREGSERUM, HCG, HCGQUANT     ABGs: No results found for: PHART, PO2ART, OVP6GQS, VIE2SZV, BEART, M9LQWUMZ     Type & Screen (If Applicable):  No results found for: LABABO, LABRH    Drug/Infectious Status (If Applicable):  No results found for: HIV, HEPCAB    COVID-19 Screening (If Applicable): No results found for: COVID19        Anesthesia Evaluation  Patient summary reviewed and Nursing notes reviewed   history of anesthetic complications: PONV.   Airway: Mallampati: II  TM distance: >3 FB Neck ROM: full  Mouth opening: > = 3 FB Dental:          Pulmonary:normal exam  breath sounds clear to auscultation  (+) sleep apnea: on CPAP,                             Cardiovascular:  Exercise tolerance: good (>4 METS),   (+) hypertension:,       ECG reviewed                        Neuro/Psych:   (+) psychiatric history:            GI/Hepatic/Renal:   (+) GERD:, liver disease:,           Endo/Other:    (+) DiabetesType II DM, , hypothyroidism::., .          Pt had no PAT visit       Abdominal:   (+) obese,     Abdomen: soft. Vascular: negative vascular ROS. Other Findings:             Anesthesia Plan      general     ASA 3       Induction: intravenous. MIPS: Postoperative opioids intended and Prophylactic antiemetics administered. Anesthetic plan and risks discussed with patient. Plan discussed with CRNA.                   Benny Rojo,    3/28/2022

## 2022-03-28 NOTE — BRIEF OP NOTE
Brief Postoperative Note      Patient: Johnny Jimenez  YOB: 1965  MRN: [id number]    Date of Procedure: 3/28/2022    Pre-Op Diagnosis: 1. MORBID OBESITY  2. BMI 41  3.  Dm    Post-Op Diagnosis: Same       Procedure(s):  ROBOTIC GASTRECTOMY SLEEVE    Surgeon(s):  Kusum Dorantes MD    Assistant:  First Assistant: Zulma Daily RN    Anesthesia: General/local    Estimated Blood Loss (mL): 10 ml    Complications: None    Specimens:   ID Type Source Tests Collected by Time Destination   A : GASTRIC REMENENTS Tissue Stomach SURGICAL PATHOLOGY Kusum Dorantes MD 3/28/2022 1019        Implants:  * No implants in log *      Drains: * No LDAs found *    Findings: as above - see op note for details    Electronically signed by Kusum Dorantes MD on 3/28/2022 at 11:01 AM

## 2022-03-28 NOTE — INTERVAL H&P NOTE
Update History & Physical    The patient's History and Physical was reviewed with the patient and I examined the patient. There was no change. The surgical site was confirmed by the patient and me. Plan: The risks, benefits, expected outcome, and alternative to the recommended procedure have been discussed with the patient. Patient understands and wants to proceed with the procedure. The patient was counseled at length about the risks of edwin Covid-19 during their perioperative period and any recovery window from their procedure. The patient was made aware that edwin Covid-19  may worsen their prognosis for recovering from their procedure  and lend to a higher morbidity and/or mortality risk. All material risks, benefits, and reasonable alternatives including postponing the procedure were discussed. The patient does wish to proceed with the procedure at this time.     Electronically signed by Aidan Jaime MD on 3/28/2022 at 9:01 AM

## 2022-03-29 ENCOUNTER — APPOINTMENT (OUTPATIENT)
Dept: GENERAL RADIOLOGY | Age: 57
DRG: 621 | End: 2022-03-29
Attending: SURGERY
Payer: MEDICARE

## 2022-03-29 LAB
ANION GAP SERPL CALCULATED.3IONS-SCNC: 12 MEQ/L (ref 8–16)
BUN BLDV-MCNC: 16 MG/DL (ref 7–22)
CALCIUM SERPL-MCNC: 9.6 MG/DL (ref 8.5–10.5)
CHLORIDE BLD-SCNC: 102 MEQ/L (ref 98–111)
CO2: 22 MEQ/L (ref 23–33)
CREAT SERPL-MCNC: 0.7 MG/DL (ref 0.4–1.2)
GFR SERPL CREATININE-BSD FRML MDRD: 86 ML/MIN/1.73M2
GLUCOSE BLD-MCNC: 113 MG/DL (ref 70–108)
GLUCOSE BLD-MCNC: 122 MG/DL (ref 70–108)
GLUCOSE BLD-MCNC: 122 MG/DL (ref 70–108)
GLUCOSE BLD-MCNC: 128 MG/DL (ref 70–108)
GLUCOSE BLD-MCNC: 152 MG/DL (ref 70–108)
HCT VFR BLD CALC: 34.5 % (ref 37–47)
HEMOGLOBIN: 11.4 GM/DL (ref 12–16)
POTASSIUM REFLEX MAGNESIUM: 3.9 MEQ/L (ref 3.5–5.2)
SODIUM BLD-SCNC: 136 MEQ/L (ref 135–145)

## 2022-03-29 PROCEDURE — 6360000002 HC RX W HCPCS: Performed by: SURGERY

## 2022-03-29 PROCEDURE — 85014 HEMATOCRIT: CPT

## 2022-03-29 PROCEDURE — 85018 HEMOGLOBIN: CPT

## 2022-03-29 PROCEDURE — 2580000003 HC RX 258: Performed by: SURGERY

## 2022-03-29 PROCEDURE — 80048 BASIC METABOLIC PNL TOTAL CA: CPT

## 2022-03-29 PROCEDURE — 1200000000 HC SEMI PRIVATE

## 2022-03-29 PROCEDURE — 6360000004 HC RX CONTRAST MEDICATION: Performed by: SURGERY

## 2022-03-29 PROCEDURE — 74240 X-RAY XM UPR GI TRC 1CNTRST: CPT

## 2022-03-29 PROCEDURE — 82948 REAGENT STRIP/BLOOD GLUCOSE: CPT

## 2022-03-29 PROCEDURE — 6370000000 HC RX 637 (ALT 250 FOR IP): Performed by: NURSE PRACTITIONER

## 2022-03-29 PROCEDURE — A4641 RADIOPHARM DX AGENT NOC: HCPCS | Performed by: SURGERY

## 2022-03-29 PROCEDURE — 94760 N-INVAS EAR/PLS OXIMETRY 1: CPT

## 2022-03-29 PROCEDURE — 99024 POSTOP FOLLOW-UP VISIT: CPT | Performed by: NURSE PRACTITIONER

## 2022-03-29 PROCEDURE — 36415 COLL VENOUS BLD VENIPUNCTURE: CPT

## 2022-03-29 PROCEDURE — 6370000000 HC RX 637 (ALT 250 FOR IP): Performed by: SURGERY

## 2022-03-29 RX ORDER — CLONIDINE HYDROCHLORIDE 0.1 MG/1
0.1 TABLET ORAL 2 TIMES DAILY
Status: DISCONTINUED | OUTPATIENT
Start: 2022-03-29 | End: 2022-03-30 | Stop reason: HOSPADM

## 2022-03-29 RX ORDER — LEVOTHYROXINE SODIUM 137 UG/1
137 TABLET ORAL DAILY
Status: DISCONTINUED | OUTPATIENT
Start: 2022-03-29 | End: 2022-03-30 | Stop reason: HOSPADM

## 2022-03-29 RX ORDER — LOSARTAN POTASSIUM 100 MG/1
100 TABLET ORAL DAILY
Status: DISCONTINUED | OUTPATIENT
Start: 2022-03-29 | End: 2022-03-30 | Stop reason: HOSPADM

## 2022-03-29 RX ADMIN — INSULIN LISPRO 1 UNITS: 100 INJECTION, SOLUTION INTRAVENOUS; SUBCUTANEOUS at 11:21

## 2022-03-29 RX ADMIN — SODIUM CHLORIDE: 9 INJECTION, SOLUTION INTRAVENOUS at 04:11

## 2022-03-29 RX ADMIN — ONDANSETRON 4 MG: 2 INJECTION INTRAMUSCULAR; INTRAVENOUS at 05:23

## 2022-03-29 RX ADMIN — ONDANSETRON 4 MG: 2 INJECTION INTRAMUSCULAR; INTRAVENOUS at 11:21

## 2022-03-29 RX ADMIN — KETOROLAC TROMETHAMINE 15 MG: 30 INJECTION, SOLUTION INTRAMUSCULAR; INTRAVENOUS at 14:17

## 2022-03-29 RX ADMIN — LEVOTHYROXINE SODIUM 137 MCG: 0.14 TABLET ORAL at 11:21

## 2022-03-29 RX ADMIN — CLONIDINE HYDROCHLORIDE 0.1 MG: 0.1 TABLET ORAL at 20:01

## 2022-03-29 RX ADMIN — CLONIDINE HYDROCHLORIDE 0.1 MG: 0.1 TABLET ORAL at 11:21

## 2022-03-29 RX ADMIN — ONDANSETRON 4 MG: 2 INJECTION INTRAMUSCULAR; INTRAVENOUS at 22:28

## 2022-03-29 RX ADMIN — ONDANSETRON 4 MG: 2 INJECTION INTRAMUSCULAR; INTRAVENOUS at 18:04

## 2022-03-29 RX ADMIN — SODIUM CHLORIDE: 9 INJECTION, SOLUTION INTRAVENOUS at 18:40

## 2022-03-29 RX ADMIN — IOHEXOL 50 ML: 240 INJECTION, SOLUTION INTRATHECAL; INTRAVASCULAR; INTRAVENOUS; ORAL at 08:13

## 2022-03-29 RX ADMIN — ENOXAPARIN SODIUM 40 MG: 40 INJECTION SUBCUTANEOUS at 20:02

## 2022-03-29 RX ADMIN — BARIUM SULFATE 50 ML: 0.6 SUSPENSION ORAL at 08:13

## 2022-03-29 RX ADMIN — KETOROLAC TROMETHAMINE 15 MG: 30 INJECTION, SOLUTION INTRAMUSCULAR; INTRAVENOUS at 20:01

## 2022-03-29 RX ADMIN — ENOXAPARIN SODIUM 40 MG: 40 INJECTION SUBCUTANEOUS at 09:06

## 2022-03-29 RX ADMIN — METOCLOPRAMIDE 10 MG: 5 INJECTION, SOLUTION INTRAMUSCULAR; INTRAVENOUS at 09:06

## 2022-03-29 RX ADMIN — KETOROLAC TROMETHAMINE 15 MG: 30 INJECTION, SOLUTION INTRAMUSCULAR; INTRAVENOUS at 00:40

## 2022-03-29 RX ADMIN — LOSARTAN POTASSIUM 100 MG: 100 TABLET, FILM COATED ORAL at 11:20

## 2022-03-29 RX ADMIN — KETOROLAC TROMETHAMINE 15 MG: 30 INJECTION, SOLUTION INTRAMUSCULAR; INTRAVENOUS at 09:06

## 2022-03-29 RX ADMIN — SODIUM CHLORIDE: 9 INJECTION, SOLUTION INTRAVENOUS at 11:26

## 2022-03-29 ASSESSMENT — PAIN DESCRIPTION - DESCRIPTORS: DESCRIPTORS: PRESSURE

## 2022-03-29 ASSESSMENT — PAIN DESCRIPTION - PROGRESSION

## 2022-03-29 ASSESSMENT — PAIN DESCRIPTION - PAIN TYPE: TYPE: SURGICAL PAIN

## 2022-03-29 ASSESSMENT — PAIN DESCRIPTION - ONSET: ONSET: ON-GOING

## 2022-03-29 ASSESSMENT — PAIN SCALES - GENERAL
PAINLEVEL_OUTOF10: 4
PAINLEVEL_OUTOF10: 4
PAINLEVEL_OUTOF10: 7
PAINLEVEL_OUTOF10: 4
PAINLEVEL_OUTOF10: 3
PAINLEVEL_OUTOF10: 4
PAINLEVEL_OUTOF10: 7
PAINLEVEL_OUTOF10: 4

## 2022-03-29 ASSESSMENT — PAIN DESCRIPTION - ORIENTATION: ORIENTATION: MID

## 2022-03-29 ASSESSMENT — PAIN DESCRIPTION - LOCATION: LOCATION: ABDOMEN

## 2022-03-29 ASSESSMENT — PAIN DESCRIPTION - FREQUENCY: FREQUENCY: CONTINUOUS

## 2022-03-29 NOTE — PLAN OF CARE
Problem: Pain:  Goal: Pain level will decrease  Description: Pain level will decrease  Outcome: Ongoing  Note: Pt. Pain has been 7 out of 10. Pain goal is no pain. Non-pharmacological are ice, rest and reposition. Problem: Cardiovascular  Goal: No DVT, peripheral vascular complications  Outcome: Ongoing  Note: No s/s of DVT. Pt     Problem: GI  Goal: No bowel complications  0/40/2022 0431 by Ruel Darnell RN  Note: Bowel sounds hypoactive, no nausea noted. Problem:   Goal: Adequate urinary output  3/29/2022 0431 by Ruel Darnell RN  Note: Pt. Voiding appropriatley. Problem: Nutrition  Goal: Optimal nutrition therapy  3/29/2022 0431 by Ruel Darnell RN  Note: Pt. Allowed Ice chips only and tolerating fine. Problem: Skin Integrity/Risk  Goal: No skin breakdown during hospitalization  3/29/2022 0431 by Ruel Darnell RN  Note: Pt. Has surgical site on abdomen x6. Clean, dry and intact. No other skin impairments noted. Problem: Musculor/Skeletal Functional Status  Goal: Highest potential functional level  3/29/2022 0431 by Ruel Darnell RN  Note: Pt. Up Independently . Care plan reviewed with patient. Patient verbalize understanding of the plan of care and contribute to goal setting.

## 2022-03-29 NOTE — CARE COORDINATION
3/29/22, 7:21 AM EDT  DISCHARGE PLANNING EVALUATION:    Anita Zendejas       Admitted: 3/28/2022/ 29 [address] day: 1   Location: [facility]23/023-A Reason for admit: Obesity [E66.9]  Morbid obesity ([facility] [E66.01]   PMH:  has a past medical history of Body mass index 45.0-49.9, adult ([facility] [facility] Cancer ([facility] [address] [facility] Depression, GERD (gastroesophageal reflux disease), History of palpitations, Hyperlipidemia, Hypertension, Hypothyroidism, Irritable bowel syndrome, DIANNE on CPAP, Osteoarthritis, PONV (postoperative nausea and vomiting), Prolonged emergence from general anesthesia, and Type II or unspecified type diabetes mellitus without mention of complication, not stated as uncontrolled. Procedure:   3/28 ROBOTIC GASTRECTOMY SLEEVE  Barriers to Discharge:  POD 1. FL UGI. IVF. Pain and nausea control. NPO, anticipate sugar free liquids later today. Abdominal binder. Ambulate. Daily wts. I/O.   PCP: ALESSIA Castro NP  Readmission Risk Score: 4.6 ( )%    Patient Goals/Plan/Treatment Preferences: Spoke with Edgard Randhawahe yesterday afternoon, she plans to return home with her . She is agreeable to [facility] as Dr. Jayy Jon consults [facility] prior to surgery. Denies further needs. Message left with [facility] to update on admission. Transportation/Food Security/Housekeeping Addressed:  No issues identified.

## 2022-03-30 VITALS
WEIGHT: 270.4 LBS | DIASTOLIC BLOOD PRESSURE: 61 MMHG | HEART RATE: 93 BPM | OXYGEN SATURATION: 95 % | BODY MASS INDEX: 40.98 KG/M2 | SYSTOLIC BLOOD PRESSURE: 131 MMHG | RESPIRATION RATE: 16 BRPM | HEIGHT: 68 IN | TEMPERATURE: 98.3 F

## 2022-03-30 LAB
GLUCOSE BLD-MCNC: 104 MG/DL (ref 70–108)
GLUCOSE BLD-MCNC: 114 MG/DL (ref 70–108)

## 2022-03-30 PROCEDURE — 99024 POSTOP FOLLOW-UP VISIT: CPT | Performed by: NURSE PRACTITIONER

## 2022-03-30 PROCEDURE — 2580000003 HC RX 258: Performed by: SURGERY

## 2022-03-30 PROCEDURE — 82948 REAGENT STRIP/BLOOD GLUCOSE: CPT

## 2022-03-30 PROCEDURE — 6370000000 HC RX 637 (ALT 250 FOR IP): Performed by: NURSE PRACTITIONER

## 2022-03-30 PROCEDURE — 6360000002 HC RX W HCPCS: Performed by: SURGERY

## 2022-03-30 RX ORDER — KETOROLAC TROMETHAMINE 10 MG/1
10 TABLET, FILM COATED ORAL EVERY 6 HOURS PRN
Qty: 20 TABLET | Refills: 0 | Status: SHIPPED | OUTPATIENT
Start: 2022-03-30 | End: 2022-05-05

## 2022-03-30 RX ORDER — POLYETHYLENE GLYCOL 3350 17 G/17G
17 POWDER, FOR SOLUTION ORAL DAILY PRN
Status: DISCONTINUED | OUTPATIENT
Start: 2022-03-30 | End: 2022-03-30 | Stop reason: HOSPADM

## 2022-03-30 RX ORDER — DOCUSATE SODIUM 100 MG/1
100 CAPSULE, LIQUID FILLED ORAL DAILY
Status: DISCONTINUED | OUTPATIENT
Start: 2022-03-30 | End: 2022-03-30 | Stop reason: HOSPADM

## 2022-03-30 RX ADMIN — KETOROLAC TROMETHAMINE 15 MG: 30 INJECTION, SOLUTION INTRAMUSCULAR; INTRAVENOUS at 10:03

## 2022-03-30 RX ADMIN — DOCUSATE SODIUM 100 MG: 100 CAPSULE, LIQUID FILLED ORAL at 08:45

## 2022-03-30 RX ADMIN — ENOXAPARIN SODIUM 40 MG: 40 INJECTION SUBCUTANEOUS at 08:46

## 2022-03-30 RX ADMIN — LEVOTHYROXINE SODIUM 137 MCG: 0.14 TABLET ORAL at 05:58

## 2022-03-30 RX ADMIN — KETOROLAC TROMETHAMINE 15 MG: 30 INJECTION, SOLUTION INTRAMUSCULAR; INTRAVENOUS at 01:16

## 2022-03-30 RX ADMIN — MAGNESIUM HYDROXIDE 30 ML: 400 SUSPENSION ORAL at 08:46

## 2022-03-30 RX ADMIN — LOSARTAN POTASSIUM 100 MG: 100 TABLET, FILM COATED ORAL at 08:45

## 2022-03-30 RX ADMIN — CLONIDINE HYDROCHLORIDE 0.1 MG: 0.1 TABLET ORAL at 08:45

## 2022-03-30 RX ADMIN — SODIUM CHLORIDE: 9 INJECTION, SOLUTION INTRAVENOUS at 02:02

## 2022-03-30 RX ADMIN — ONDANSETRON 4 MG: 2 INJECTION INTRAMUSCULAR; INTRAVENOUS at 11:58

## 2022-03-30 RX ADMIN — ONDANSETRON 4 MG: 2 INJECTION INTRAMUSCULAR; INTRAVENOUS at 05:58

## 2022-03-30 RX ADMIN — SODIUM CHLORIDE: 9 INJECTION, SOLUTION INTRAVENOUS at 10:36

## 2022-03-30 ASSESSMENT — PAIN DESCRIPTION - LOCATION: LOCATION: ABDOMEN

## 2022-03-30 ASSESSMENT — PAIN SCALES - GENERAL
PAINLEVEL_OUTOF10: 3
PAINLEVEL_OUTOF10: 0
PAINLEVEL_OUTOF10: 6
PAINLEVEL_OUTOF10: 2
PAINLEVEL_OUTOF10: 0
PAINLEVEL_OUTOF10: 4

## 2022-03-30 ASSESSMENT — PAIN DESCRIPTION - PAIN TYPE: TYPE: SURGICAL PAIN

## 2022-03-30 NOTE — PROGRESS NOTES
[facility] - Dr. Shar Lewis [facility]  Postoperative Bariatric Progress Note    Pt Name: Gio Olvera Record Number: [id number]  Date of Birth 1965   Today's Date: 3/30/2022    ASSESSMENT   1. POD # 2 S/p robotic assisted sleeve gastrectomy  2. Morbid obesity (BMI 41)  3. Diabetes Mellitus  4. Hypertension    has a past medical history of Body mass index 45.0-49.9, adult ([facility] [address] [facility] Cancer ([facility] [facility] Depression, GERD (gastroesophageal reflux disease), History of palpitations, Hyperlipidemia, Hypertension, Hypothyroidism, Irritable bowel syndrome, DIANNE on CPAP, Osteoarthritis, PONV (postoperative nausea and vomiting), Prolonged emergence from general anesthesia, and Type II or unspecified type diabetes mellitus without mention of complication, not stated as uncontrolled. PLAN   1. Upper GI without evidence of leak or obstruction. 2. Tolerating sugar free clear liquids. Strict I&Os. Tolerating protein shake 8 ounces. 3. IV to INT  4. DVT & GI prophylaxis  5. Routine incisional care  6. Pain & nausea control  7. Ambulation every 1-2 hours  8. Stool softeners  9. Resume home medications. Chems ACHS. 10. Doing well. Home today. Follow up Monday. Discharge instructions reviewed. SUBJECTIVE   Patient was stable overnight. Chart reviewed. VS reviewed. Updated by nursing staff. Denies chest discomfort or dyspnea. Gas pressure improving. No nausea or vomiting. (+) belching, flatus, but no BM. Tolerating BARIATRIC DIET; Bariatric Clear Liquid  ADULT ORAL NUTRITION SUPPLEMENT; Breakfast, Lunch; Other Oral Supplement; ENSURE HIGH PROTEIN ONLY diet. Pain controlled with analgesia. Up ad samia. No urinary complaints. Abdominal incisions look okay.   CURRENT MEDICATIONS   Scheduled Meds:   docusate sodium  100 mg Oral Daily    cloNIDine  0.1 mg Oral BID    losartan  100 mg Oral Daily    levothyroxine  137 mcg Oral Daily    cefOXitin  2,000 mg IntraVENous 30 Min Pre-Op    ketorolac  15 mg IntraVENous Q6H    insulin lispro  0-6 Units SubCUTAneous TID WC    insulin lispro  0-3 Units SubCUTAneous Nightly    sodium chloride flush  10 mL IntraVENous 2 times per day    ondansetron  4 mg IntraVENous Q6H    scopolamine  1 patch TransDERmal Q72H    enoxaparin  40 mg SubCUTAneous 2 times per day    sodium chloride  500 mL IntraVENous Once     Continuous Infusions:   sodium chloride 100 mL/hr at 22 1840    sodium chloride      sodium chloride 125 mL/hr at 22 1036    dextrose       PRN Meds:.magnesium hydroxide, polyethylene glycol, hyoscyamine, sodium chloride flush, sodium chloride, morphine **OR** morphine, promethazine, promethazine, metoclopramide, glucose, glucagon (rDNA), dextrose, glucose, dextrose bolus (hypoglycemia) **OR** dextrose bolus (hypoglycemia)  OBJECTIVE   CURRENT VITALS:  height is 5' 8\" (1.727 m) and weight is 270 lb 6.4 oz (122.7 kg). Her oral temperature is 98.1 °F (36.7 °C). Her blood pressure is 164/72 (abnormal) and her pulse is 76. Her respiration is 16 and oxygen saturation is 98%.    Temperature Range (24h):Temp: 98.1 °F (36.7 °C) Temp  Av.4 °F (36.9 °C)  Min: 98.1 °F (36.7 °C)  Max: 99.1 °F (37.3 °C)  BP Range (40P): Systolic (01NVU), KUV:018 , Min:143 , OVF:267     Diastolic (86DUO), WOD:18, Min:64, Max:72    Pulse Range (24h): Pulse  Av.2  Min: 75  Max: 77  Respiration Range (24h): Resp  Av.8  Min: 16  Max: 18  Current Pulse Ox (24h):  SpO2: 98 %  Pulse Ox Range (24h):  SpO2  Av.8 %  Min: 94 %  Max: 98 %  Oxygen Amount and Delivery: O2 Flow Rate (L/min): 1 L/min  Incentive Spirometry Tx:            GENERAL: alert, no distress  LUNGS: clear to ausculation, without wheezes, rales or rhonci  HEART: normal rate and regular rhythm  ABDOMEN: non-distended, soft, incisional tenderness, bowel sounds present   INCISION: healing well, no significant drainage, no significant erythema  EXTERMITY: no cyanosis, clubbing or edema  In: 590 [P.O.:590]  Out: -   Date 03/30/22 0000 - 03/30/22 2359   Shift 3158-3948 2244-7245 0905-6752 24 Hour Total   INTAKE   P.O. 250   250   Shift Total(mL/kg) 250(2)   250(2)   OUTPUT   Shift Total(mL/kg)       Weight (kg) 122.7 122.7 122.7 122.7     LABS     Recent Labs     03/28/22  0808 03/28/22  1557 03/29/22  0605   HGB  --  12.5 11.4*   HCT  --  38.7 34.5*   NA  --   --  136   K 3.8  --  3.9   CL  --   --  102   CO2  --   --  22*   BUN  --   --  16   CREATININE  --   --  0.7   CALCIUM  --   --  9.6      RADIOLOGY     PROCEDURE: FL UGI       CLINICAL INFORMATION: s/p sleeve gastrectomy .       TECHNIQUE: Upright  image of the abdomen was done. Patient was then administered a small amount of Omnipaque 240 oral contrast. Fluoroscopic images were obtained. Patient was administered small amount of thin barium.    Images: 5   Fluoroscopy time: 1.2 minutes       COMPARISON: No prior study.           FINDINGS:  image demonstrates free air from the patient's recent surgery. Bowel gas pattern is nonobstructive. No evidence of leak or obstruction. Normal passage of contrast into the duodenum.           Impression   Normal appearance status post gastric sleeve no evidence of leak or obstruction               **This report has been created using voice recognition software.  It may contain minor errors which are inherent in voice recognition technology. **       Final report electronically signed by Dr. Tony Campo on 3/29/2022 9:07 AM     Electronically signed by ALESSIA Ferrer CNP on 3/30/2022 at 11:12 AM     Patient seen and examined independently by me earlier this AM. Above discussed and I agree with Lia Huitron CNP. See my additional comments below for updated orders and plan. Labs, cultures, and radiographs where available were reviewed. I discussed patient concerns with the patient's nurse and instructions were given. Please see our orders for the updated patient care plan.   -Tolerating liquids better. Pain and nausea much improved. Stool softeners/laxatives and suppositories. Ambulating. Home medications. DVT prophylaxis. GI prophylaxis. Home today. Follow-up in office.     Electronically signed by Azar Wrad MD on 3/30/22 at 3:30 PM EDT

## 2022-03-30 NOTE — CARE COORDINATION
3/30/22, 4:28 PM EDT  Home with ; new [facility]. Patient goals/plan/ treatment preferences discussed by  and . Patient goals/plan/ treatment preferences reviewed with patient/ family. Patient/ family verbalize understanding of discharge plan and are in agreement with goal/plan/treatment preferences. Understanding was demonstrated using the teach back method. AVS provided by RN at time of discharge, which includes all necessary medical information pertaining to the patients current course of illness, treatment, post-discharge goals of care, and treatment preferences. IMM Letter  IMM Letter given to Patient/Family/Significant other/Guardian/POA/by[de-identified] CM:  Willa  IMM Letter date given[de-identified] 03/28/22  IMM Letter time given[de-identified] 5609

## 2022-03-30 NOTE — PLAN OF CARE
Problem: Pain:  Goal: Pain level will decrease  Description: Pain level will decrease  3/30/2022 0023 by Ruby Marrero LPN  Outcome: Ongoing  Note: Patient rates pain 4/10. Pt states their desired pain goal is to have no pain. Non-pharmacologic measures include ice, ambulation, rest, and repositioning with pillow support. 3/30/2022 0009 by Ruby Marrero LPN  Outcome: Ongoing     Problem: Cardiovascular  Goal: No DVT, peripheral vascular complications  4/36/0338 0023 by Ruby Marrero LPN  Outcome: Ongoing  Note: Patient remains free of S/S of DVT. Lovenox 40 mg administered Q 12 hours as ordered and patient ambulating around units every 2 hours. 3/30/2022 0009 by Ruby Marrero LPN  Outcome: Ongoing     Problem: Musculor/Skeletal Functional Status  Goal: Highest potential functional level  3/30/2022 0023 by Ruby Marrero LPN  Outcome: Ongoing  Note: Patient ambulating independently with no device. 3/30/2022 0009 by Ruby Marrero LPN  Outcome: Ongoing      Problem: Skin Integrity/Risk  Goal: No skin breakdown during hospitalization  3/30/2022 0023 by Ruby Marrero LPN  Outcome: Ongoing  Note: Patient has X6 abdominal surgical sites that are clean, dry, and intact with steri strips and band aids. Surgical site care provided PRN as neededNo other skin issues noted. 3/30/2022 0009 by Ruby Marrero LPN  Outcome: Ongoing     Care plan reviewed with patient. Patient  verbalizes understanding of the plan of care and contribute to goal setting.

## 2022-03-31 NOTE — PROGRESS NOTES
Pt given discharge instructions with permission to allow  to remain in room during teaching obtained. No rx sent with pt. Follow up advised. All personal belongings sent with pt. To car via wheelchair.

## 2022-03-31 NOTE — DISCHARGE SUMMARY
Taylor Brand [address] [facility]       Pt Name: Bev Hester  MRN: [id number]  YOB: 1965  Primary Care Physician: ALESSIA Roy NP    Admit date:  3/28/2022  7:11 AM      Discharge date:  3/30/2022  5:23 PM    Admitting Diagnosis:   1.  Morbid obesity  2.  BMI 41  3. DM    Discharge Diagnosis:   1.  Morbid obesity  2.  BMI 41  3. DM  4. Status post robotic sleeve gastrectomy    Admitting Service: General Surgery, Shahzad Aguero MD.    Consultants:  None    History and Physical:  Maye Fisher (:  1965)      ASSESSMENT:  1.  Morbid obesity (BMI 43)  2.  Sleep apnea  3.  Hypercholesterolemia  4.  Depression  5.  Anxiety  6.  Polycystic ovarian disease  7.  Fatty liver disease  8.  Chronic lower back pain  9.  Chronic left knee pain  10.  GERD  11.  Irritable bowel syndrome  12.  Hypothyroidism  13.  Hypertension  14.  Vitamin D deficiency  15.  Diabetes mellitus     PLAN:  1. Discussion again about the pros and cons of weight loss surgery.  The risks benefits and alternatives to laparoscopic adjustable band, gastric sleeve and gastric Monet-en-Y bypass were discussed in detail.  The pros and cons of robotic assisted, laparoscopic and open techniques were discussed. 2.  Behavior modification discussed again in regards to dietary habits. 3.  Nutritional education occurred during visit.  Followup with dietitian for further evaluation.  Continue to follow recommendations as directed. 4.  Options for medical management of morbid obesity discussed. 5.  Improvement in fitness/exercise discussed with patient and the need for this with/without surgery. 6.  Medical necessity letter from PCP. 7.  Follow-up in one month at weight management program at [facility]. 8.  Signs and symptoms reviewed with patient that would be concerning and need her to return to office for re-evaluation.  Patient states she will call if she has questions or concerns.   9. Multivitamin  10.  Psychology evaluation completed.  Follow-up as needed. 6.  EGD completed.  No significant hiatal hernia.  H. pylori negative.  Following up with GI as directed.   12.  Encouraged support groups  13.  Send LOMN     Patient states that  she has not been able to lose enough adequate excess body weight with medical management only and would like to proceed with a robotic sleeve gastrectomy for further weight loss.     More than 30 minutes spent with patient today. Derik  than 50% of the time was involved counseling, educaton and coordinating care.     SUBJECTIVE/OBJECTIVE:             Chief Complaint   Patient presents with    Weight Loss       month 6 of 6 - bypass      HPI  Maye is a 48-year-old female who presents for follow-up at the weight management program secondary to her morbid obesity.  BMI 37.  Current weight 285 pounds. Graciela Márquez has multiple significant comorbid conditions.  She has not been able to lose enough adequate excess body weight with medical management only and is wishing to proceed with surgical intervention.  She originally wanted to proceed with a gastric bypass but now she thinks a sleeve gastrectomy is her best option/choice.  She has completed psychology evaluation.  Wearing her CPAP machine.  Following with the pulmonary service.  Following with dietitian. Indira Le to work on portion control and food selection.  Completed upper endoscopy.  H. pylori negative.  No significant hiatal hernia.  Completed 30 days of omeprazole secondary to some gastritis.  Denies current chest or abdominal pain.  No hematochezia or melena.  No new urinary complaints.  Lower back pain and lower extremity joint aching because of the excess body weight.  Has been trying to do chair exercises and walking.  Taking multivitamin.  Taking vitamin D.  She states she is excited to proceed with surgical intervention so as to continue to work towards her weight loss goals.     Review of Systems   Constitutional: Positive for fatigue. Negative for activity change, appetite change, chills, diaphoresis, fever and unexpected weight change. HENT: Negative for congestion, dental problem, drooling, ear discharge, ear pain, facial swelling, hearing loss, mouth sores, nosebleeds, postnasal drip, rhinorrhea, sinus pressure, sneezing, sore throat, tinnitus, trouble swallowing and voice change.    Eyes: Negative for photophobia, pain, discharge, redness, itching and visual disturbance. Respiratory: Negative for apnea, cough, choking, chest tightness, shortness of breath, wheezing and stridor.    Cardiovascular: Negative for chest pain, palpitations and leg swelling. Gastrointestinal: Negative for abdominal distention, abdominal pain, anal bleeding, blood in stool, constipation, diarrhea, nausea, rectal pain and vomiting. Endocrine: Negative.    Genitourinary: Negative for decreased urine volume, difficulty urinating, dyspareunia, dysuria, enuresis, flank pain, frequency, genital sores, hematuria, menstrual problem, pelvic pain, urgency, vaginal bleeding, vaginal discharge and vaginal pain. Musculoskeletal: Positive for back pain. Negative for arthralgias, gait problem, joint swelling, myalgias, neck pain and neck stiffness. Skin: Negative for color change, pallor, rash and wound. Allergic/Immunologic: Negative.    Neurological: Negative for dizziness, tremors, seizures, syncope, facial asymmetry, speech difficulty, weakness, light-headedness, numbness and headaches. Hematological: Negative for adenopathy. Does not bruise/bleed easily. Psychiatric/Behavioral: Negative for agitation, behavioral problems, confusion, decreased concentration, dysphoric mood, hallucinations, self-injury, sleep disturbance and suicidal ideas. The patient is nervous/anxious.  The patient is not hyperactive.          Past Medical History           Past Medical History: Diagnosis Date    Depression      GERD (gastroesophageal reflux disease)      Hypertension      Hypothyroidism      Irritable bowel syndrome      Osteoarthritis      Type II or unspecified type diabetes mellitus without mention of complication, not stated as uncontrolled              Past Surgical History             Past Surgical History:   Procedure Laterality Date    CHOLECYSTECTOMY        COLONOSCOPY   longer than 3 yr ago     [facility]    KNEE ARTHROSCOPY Left       x2    OTHER SURGICAL HISTORY         knee ablation     TUBAL LIGATION                Current Facility-Administered Medications               Current Outpatient Medications   Medication Sig Dispense Refill    fluticasone-vilanterol (BREO ELLIPTA) 100-25 MCG/INH AEPB inhaler Inhale into the lungs daily        ALBUTEROL SULFATE HFA IN Inhale into the lungs        ELDERBERRY PO Take 2 tablets by mouth daily        medium chain triglycerides (MCT OIL) oil Take 15 mLs by mouth daily        metFORMIN (GLUCOPHAGE) 1000 MG tablet TAKE 1 TABLET BY MOUTH TWICE DAILY WITH MORNING AND EVENING MEALS        SITagliptin (JANUVIA) 100 MG tablet Take 100 mg by mouth daily        losartan (COZAAR) 100 MG tablet Take 100 mg by mouth daily        Daily Multiple Vitamins TABS Take by mouth        Insulin Glargine (TOUJEO SOLOSTAR SC) Inject 130 Units into the skin daily         gabapentin (NEURONTIN) 600 MG tablet Take 600 mg by mouth 3 times daily.         Cholecalciferol (VITAMIN D) 125 MCG (5000 UT) CAPS Take 5,000 Units by mouth daily         atorvastatin (LIPITOR) 40 MG tablet Take 40 mg by mouth daily        hydroCHLOROthiazide (HYDRODIURIL) 50 MG tablet          cloNIDine (CATAPRES) 0.1 MG tablet Take 0.1 mg by mouth 2 times daily        Levothyroxine Sodium 137 MCG CAPS Take 137 mcg by mouth Daily         Citalopram Hydrobromide (CELEXA PO) Take 20 mg by mouth daily         CarBAMazepine (TEGRETOL PO) Take 200 mg by mouth 2 times daily         AMLODIPINE BESYLATE PO Take  by mouth daily.        aspirin 81 MG tablet Take 81 mg by mouth daily.        hydrocortisone (ANUSOL-HC) 2.5 % CREA rectal cream          DICLOFENAC PO Take 50 mg by mouth 2 times daily  (Patient not taking: Reported on 2022)          No current facility-administered medications for this visit.                    Allergies   Allergen Reactions    Ampicillin Hives    Motrin [Ibuprofen] Hives         Family History             Family History   Problem Relation Age of Onset    Cancer Mother      High Blood Pressure Father      Breast Cancer Paternal Aunt      Breast Cancer Paternal Cousin           bilateral    Breast Cancer Maternal Aunt      Breast Cancer Maternal Aunt      Breast Cancer Maternal Cousin           bilateral    Breast Cancer Maternal Cousin      Prostate Cancer Maternal Uncle      Prostate Cancer Paternal Uncle              Social History                   Socioeconomic History    Marital status: Single       Spouse name: Not on file    Number of children: Not on file    Years of education: Not on file    Highest education level: Not on file   Occupational History    Not on file   Tobacco Use    Smoking status: Former Smoker       Packs/day: 2.00       Types: Cigarettes       Quit date: 4/15/1991       Years since quittin.7    Smokeless tobacco: Never Used   Substance and Sexual Activity    Alcohol use: Not Currently    Drug use: No    Sexual activity: Not on file   Other Topics Concern    Not on file   Social History Narrative    Not on file      Social Determinants of Health            Financial Resource Strain: Medium Risk    Difficulty of Paying Living Expenses: Somewhat hard   Food Insecurity: Food Insecurity Present    Worried About Running Out of Food in the Last Year: Sometimes true    Claudia of Food in the Last Year: Sometimes true   Transportation Needs: No Transportation Needs    Lack of Transportation (Medical): No    Lack of Transportation (Non-Medical): No   Physical Activity: Insufficiently Active    Days of Exercise per Week: 4 days    Minutes of Exercise per Session: 20 min   Stress: Stress Concern Present    Feeling of Stress : To some extent   Social Connections: Socially Integrated    Frequency of Communication with Friends and Family: More than three times a week    Frequency of Social Gatherings with Friends and Family: Twice a week    Attends Hindu Services: More than 4 times per year    Active Member of [address] [place] [address] Vig[employer] or Organizations: Yes    Attends Club or Organization Meetings: More than 4 times per year    Marital Status: Living with partner   Intimate Partner Violence: Not At Risk    Fear of Current or Ex-Partner: No    Emotionally Abused: No    Physically Abused: No    Sexually Abused: No   Housing Stability: Low Risk     Unable to Pay for Housing in the Last Year: No    Number of [place] in the Last Year: 1    Unstable Housing in the Last Year: No         Vitals         Vitals:     01/21/22 1003   BP: 114/70   Site: Right Upper Arm   Position: Sitting   Cuff Size: Large Adult   Pulse: 64   Temp: 97.3 °F (36.3 °C)   TempSrc: Infrared   Weight: 285 lb 6.4 oz (129.5 kg)   Height: 5' 8\" (1.727 m)         Body mass index is 43.39 kg/m².           Wt Readings from Last 3 Encounters:   01/21/22 285 lb 6.4 oz (129.5 kg)   01/21/22 285 lb 6.4 oz (129.5 kg)   12/15/21 291 lb (132 kg)      Physical Exam  Vitals reviewed. Constitutional:       General: She is not in acute distress.     Appearance: She is well-developed. She is not diaphoretic. HENT:      Head: Normocephalic and atraumatic.      Right Ear: External ear normal.      Left Ear: External ear normal.      Nose: Nose normal.   Eyes:      General: No scleral icterus.         Right eye: No discharge.         Left eye: No discharge.      Conjunctiva/sclera: Conjunctivae normal.   Cardiovascular:      Rate and Rhythm: Normal rate and regular rhythm.      Heart sounds: Normal heart sounds. Pulmonary:      Effort: Pulmonary effort is normal. No respiratory distress.      Breath sounds: Normal breath sounds. No wheezing or rales. Chest:      Chest wall: No tenderness. Abdominal:      General: Bowel sounds are normal. There is no distension.      Palpations: Abdomen is soft. There is no mass.      Tenderness: There is no abdominal tenderness. There is no guarding or rebound. Musculoskeletal:         General: No tenderness. Normal range of motion.      Cervical back: Normal range of motion and neck supple. Skin:     General: Skin is warm and dry.      Coloration: Skin is not pale.      Findings: No erythema or rash. Neurological:      Mental Status: She is alert and oriented to person, place, and time.      Cranial Nerves: No cranial nerve deficit. Psychiatric:         BehaviorRobinson Maxin     Thought Content:  Thought content normal.         Judgment: Judgment normal.         CBC             Lab Results   Component Value Date     WBC 7.7 09/10/2020     RBC 4.41 09/10/2020     HGB 13.1 09/10/2020     HCT 41.4 09/10/2020     MCV 93.9 09/10/2020     MCH 29.7 09/10/2020     MCHC 31.6 09/10/2020     RDW 13.8 09/10/2020      09/10/2020     MPV 11.8 09/10/2020     RBCMORP NOT REPORTED 09/10/2020     MONOPCT 7 09/10/2020     LYMPHSABS 2.42 09/10/2020     MONOSABS 0.50 09/10/2020     EOSABS 0.67 09/10/2020     BASOSABS 0.06 09/10/2020      BMP/CMP             Lab Results   Component Value Date     GLUCOSE 142 09/10/2020     CREATININE 0.54 09/10/2020     BUN 9 09/10/2020      09/10/2020     K 4.2 09/10/2020      09/10/2020     CO2 20 09/10/2020     CALCIUM 10.2 09/10/2020     AST 50 09/10/2020     ALKPHOS 73 09/10/2020     PROT 7.5 09/10/2020     LABALBU 4.3 09/10/2020     BILITOT 0.39 09/10/2020     ALT 48 09/10/2020      PREALBUMIN   No results found for: PREALBUMIN   VITAMIN B12   No T-score: 0.0, Z-score: 0.8, This qualifies as normal bone mineral density.     The patient does not qualify for a FRAX assessment because there is a T score at or above -1.0.     Impression  This patient has normal bone mineral density using the World Health Organization criteria. The patient is at a normal risk for fracture.     Recommendations:  Therapy recommendations need to be tailored to each individual patient. Using the [facility] FRAX absolute fracture algorithm, the [address] [place] [address] recommends beginning pharmacological therapy in postmenopausal women  and men over the age of 48 with a 8 year probability of a hip fracture of >3% OR with the 10 year probability of a major osteoporotic fracture of >20%.    Please reconsider testing based on risk factors. Currently, Medicare will only reimburse for a central DEXA examination every two years, unless the patient is on chronic glucocorticoid therapy.        **This report has been created using voice recognition software. It may contain minor errors which are inherent in voice recognition technology. **     Final report electronically signed by Dr. Arash Valdivia on 8/9/2021 3:33 PM           Patient Active Problem List   Diagnosis    Body mass index 45.0-49.9, adult ([facility] [address]    Depression    Gastroesophageal reflux disease    Hyperlipidemia    Anxiety    Fatty liver disease, nonalcoholic    Hypothyroidism    Hypertension    Diabetes mellitus type 2 in obese ([facility] [address]    Vitamin D deficiency    DIANNE on CPAP                                       An electronic signature was used to authenticate this note.     --Elissa Garza MD         ADDENDUM:  1. Schedule Maye for robotic sleeve gastrectomy.   2. She will undergo pre-operative clearance per anesthesia guidelines with risk factors listed under the past medical history diagnosis & problem list.  3. The risks, benefits and alternatives were discussed with [facility] including non-operative management. The pros and cons of robotic, laparoscopic and open techniques were discussed. All questions answered. She understands and wishes to proceed with surgical intervention. 4. Restrictions discussed with [employer] and she expresses understanding. 5. She is advised to call back directly if there are further questions/concerns, or if her symptoms worsen prior to surgery.     Electronically signed by Leslie Goldberg MD on 3/24/22 at 6:39 AM EDT         Procedures/Diagnostic Tests:      Operative Report        Pt Name: Sid Villa  MRN: [id number]  YOB: 1965  Surgeon: Vito Hong MD FACS  Primary Care Physician: ALESSIA Mckinney NP  Procedure Date: 3/28/2022                PREOPERATIVE DIAGNOSES:  1.  Morbid obesity  2.  BMI 41  3. DM     POSTOPERATIVE DIAGNOSES: Same      PROCEDURE:  Robotic sleeve gastrectomy.     SURGEON: Vito Hong M.D. FACS     ASSISTANT:  ELVA Mcnair     ANESTHESIA:  General/local.     ESTIMATED BLOOD LOSS:  10ml     DRAINS:  None     COMPLICATIONS:  None     DISPOSITION:  Stable to the recovery room     SPECIMEN:  Stomach.     INDICATIONS FOR PROCEDURE:  The patient is a 61 y.o.-old female who had  been seen in the weight management program secondary to her morbid obesity. She was not able to lose enough adequate excess body weight with medical  management only, and wished to proceed with a robotic sleeve gastrectomy.    Risks of surgery were then further discussed.  Some of the risks included  but were not limited to bleeding, infection, the need for reoperation,  severe chronic postoperative pain or numbness, major vascular or nerve  injury, cardiopulmonary complications, anesthetic complications,  seroma/hematoma formation, wound breakdown, trocar site herniation, staple  line breakdown/leak, staple line bleed, sleeve stricture, chronic nausea, chronic  pain, and death.  After all of the questions were answered in their  entirety and the patient was completely aware of the current situation, she  elected to proceed with the procedure.     DESCRIPTION OF THE PROCEDURE:  After informed consent was signed and placed  on the chart, the patient was taken back to the operating room and placed  supine on the operating room table.  General anesthesia was induced. She  tolerated this well throughout the case.  All pressure points were padded. She was on preoperative antibiotics.  Bilateral lower extremity sequential  compression devices were placed prior to incision. Her abdomen and pelvis  were prepped and draped in the usual sterile standard fashion.  A time-out  occurred prior to the operation, which not only identified the patient but  also the planned procedure to be performed.  At the end of the time-out,  there were no questions or concerns.     I began the operation first by making a small transverse incision just  above and to the right of the umbilicus.  Using low-flow insufflation and  the OptiView, the abdomen was entered into without difficulty.  The  intra-abdominal cavity was insufflated to a pressure of approximately 15  mmHg with carbon dioxide gas.  The patient tolerated the insufflation well.   Three separate 8-mm trocars were placed in their standard location under  direct vision.  A 5-mm trocar was placed in the far right lateral abdominal  region under direct vision.  Liver retractor was brought in and placed  without difficult under direct vision.  The 11 mm was then replaced by  12-mm robotic trocar for the stapler and specimen extraction.  The patient was then  placed in reverse Trendelenburg.  Robot brought in and docked.  Instruments  were placed under direct vision.  Once everything was aligned and in order,  I then unscrubbed and went back to the console.  I began the operation by  first by taking down a fair amount of adhesions off the anterior abdominal wall on the right side secondary to a history of a open gallbladder removal.  This was mainly omentum and some transverse colon. Hemostasis adequate. At that time I then proceeded with evaluating the hiatus.  There appeared to be no significant hiatal  hernia.  The lesser sac was entered into using the vessel sealer going  through the mesentery there on the greater curvature of the stomach. Mesentery here was then unzipped distally to about 5 to 6 cm proximal from the  pylorus.  It was then unzipped proximal up and around the fundus to the  left segundo.  Short gastric vessels were taken down.  Spleen preserved. Hemostasis adequate.  Here, it was confirmed there was no significant  hiatal hernia.  After it was assured that the fundus and the upper stomach  was completely mobilized, the bougie 40-Icelandic was then placed under direct  vision and positioned with the help of anesthesia.  This was then placed to suction.  First firing was  a 61 robotic black reinforced SEAMGUARD cartridge.  Care was taken to hug the bougie  but also not to stricture down the angle of the stomach.     There were several firings of the 60-mm robotic green reinforced SEAMGUARD  cartridges.  All of these hugged the bougie.  Last firing was a 60 mm robotic green reinforced SEAMGUARD  cartridge.  Here, care was taken to ensure taking all of  the excess fundus, but also not to stricture down or encroach on the GE  junction.  Staple line was then tested under irrigation with air  insufflation through the bougie and clamped distally.  No air bubbles or  any kind of concern for leak was identified.  Irrigation was then removed. Bougie taken off of suction and removed under direct visualization.   0 Ethibond suture was then placed through the  gastric remnant and brought up to the 12-mm trocar site.  Instruments were  removed.  Robot undocked.  I then scrubbed back into the case.  Gastric  remnant was removed and sent to Pathology for permanent.  Using a Storz  suture passer and 0 Vicryl suture, Hospital Course: Kwasi Black is a 26-year-old female patient who presented to the weight management program for her (morbid) obesity, BMI 41. She decided to electively undergo robotic assisted sleeve gastrectomy with Dr. Leoan Joel on 3/28/22. She was admitted to [facility] postoperative care and was initially managed with bowel rest, analgesics for pain control, IV fluid hydration, GI and DVT prophylaxis. On 3/29/22 she passed her upper GI study and patient was advanced to sugar free clear liquids. She readily improved in ability to tolerate increasing levels of activity and able to take clear liquids with minimal nausea. Patient was able to void on her own accord. Kwasi Black was discharged home on liquid diet until follow up appointment. Patient discharged with home health  so nurses can follow the patient for medication compliance, wound healing and nutrition needs. Discharge Condition: stable    Disposition: home    Labs:  Lab Results   Component Value Date    WBC 7.7 09/10/2020    HGB 11.4 (L) 03/29/2022    HCT 34.5 (L) 03/29/2022    MCV 93.9 09/10/2020     09/10/2020     Lab Results   Component Value Date     03/29/2022    K 3.9 03/29/2022     03/29/2022    CO2 22 03/29/2022    BUN 16 03/29/2022    CREATININE 0.7 03/29/2022    GLUCOSE 122 03/29/2022    CALCIUM 9.6 03/29/2022        Wt Readings from Last 3 Encounters:   03/28/22 270 lb 6.4 oz (122.7 kg)   02/28/22 278 lb (126.1 kg)   01/21/22 285 lb 6.4 oz (129.5 kg)     Discharge Instructions:  Pt Name: Maame Tapia  Medical Record Number: [id number]  Today's Date: 3/30/2022    GENERAL ANESTHESIA OR SEDATION  1. Do not drive or operate hazardous machinery for 24 hours. 2. Do not make important business or personal decisions for 24 hours. 3. Do not drink alcoholic beverages or use tobacco for 24 hours. ACTIVITY INSTRUCTIONS:  [] Rest today. Resume light to normal activity tomorrow.   [] You may resume normal activity tomorrow.  Do not engage in strenuous activity that may place stress on your incision. [x] Do not drive for 3-5 days or while taking pain medication. Avoid heavy lifting, tugging, pullings greater than 10 lbs until seen in the office. DIET INSTRUCTIONS:  [x]Other: Sugar-free post bariatric surgery diet as instructed per dietician. Continue protein shakes as prior to surgery. Two shakes a day with milk or three shakes a day with water. MEDICATIONS  [x]Prescription sent with you to be used as directed. [x]Toradol  Do not drink alcohol or drive while taking these medications. You may experience dizziness or drowsiness with these medications. You may also experience constipation which can be relieved with stool softners or laxatives. [x]You may resume your daily prescription medication schedule unless otherwise specified. [x]Do not take diclofenac while taking the Toradol    Do not resume your diabetic medications unless your blood glucose level is consistently greater than 200 mg/dL. Please make an appointment to see the physician who manages your diabetes. Take the Zofran exactly as prescribed to control nausea and vomiting. Take the Reglan exactly as prescribed to control nausea and vomiting. Take the Lovenox exactly as prescribed. Use Colace and MiraLAX as prescribed to prevent constipation. Do not allow yourself to become constipated. Drink at least 64 ounces of liquids per day. If constipated despite adequate fluid intake and no results with Colace and MiraLAX, may use magnesium citrate 1 bottle as needed. Poor over ice and sip at slowly over several hours. WOUND/DRESSING INSTRUCTIONS:  Always ensure you and your care giver clean hands before and after caring for the wound. [] Keep dressing clean and dry for 48 hours. Change when soiled or wet. [x] Allow steri-strips to fall off on their own. [x] Ice operative site for 20 minutes 4 times a day as needed.      [x] May wash over incision in shower, but do not soak in a bath.  [] Take sitz bath for 20 minutes twice daily and after bowel movements. [x] Keep the abdominal binder in place during the day. May remove to shower and at night. ABDOMINAL/LAPAROSCOPIC SURGERY  [x]You are encouraged to get up and move around as this helps with circulation, prevents blood clots from forming and speeds up the healing process. Call the office if you develop pain or swelling in your legs. Do not massage sore muscles in the legs. [x]Breath deeply and cough from time to time. This helps to clear your lungs and helps prevent pneumonia. [x]Supporting your incision with a pillow or your hand helps to minimize discomfort and pain. [x]Laparoscopic patients may develop shoulder pain in the first 48 hours from the gas used during the procedure a heating pad may help alleviate this discomfort. FOLLOW-UP CARE. SPECIFICALLY WATCH FOR:   Fever over 101 degrees by mouth   Increased redness, warmth, hardness at operative site. Blood soaked dressing (small amounts of oozing may be normal.)   Increased or progressive drainage from the surgical area   Inability to urinate or blood in the urine   Pain not relieved by the medications ordered   Persistent nausea and/or vomiting, unable to retain fluids. Pain or swelling in your legs. Shortness of breath. Call the office if you develop any of the above symptoms. FOLLOW-UP APPOINTMENT   [x]1 week: in weight management   []2 weeks:    []Other    Call my office if you have any problem that concerns you [phone]. After hours, you can reach the answering service via the office phone number. IF YOU NEED IMMEDIATE ATTENTION, GO TO THE EMERGENCY ROOM AND YOUR DOCTOR WILL BE CONTACTED. Prepared by Farnaz Partida CNP for  Hazel Witt MD  [address]  [place], [address]  Electronically signed 3/30/2022 at 1:56 PM    Discharge Medications:        Medication List      START taking these medications    ketorolac 10 MG tablet  Commonly known as: TORADOL  Take 1 tablet by mouth every 6 hours as needed for Pain        CONTINUE taking these medications    AMLODIPINE BESYLATE PO     aspirin 81 MG tablet     atorvastatin 40 MG tablet  Commonly known as: LIPITOR     Breo Ellipta 100-25 MCG/INH Aepb inhaler  Generic drug: fluticasone-vilanterol     CELEXA PO     cloNIDine 0.1 MG tablet  Commonly known as: CATAPRES     DICLOFENAC PO     Docusate Sodium 100 MG Tabs  Take 100 mg by mouth 2 times daily Take as needed to prevent constipation     ELDERBERRY PO     enoxaparin 40 MG/0.4ML injection  Commonly known as: Lovenox  Inject 0.4 mLs into the skin daily for 14 days     gabapentin 600 MG tablet  Commonly known as: NEURONTIN     hydroCHLOROthiazide 50 MG tablet  Commonly known as: HYDRODIURIL     hydrocortisone 2.5 % Crea rectal cream  Commonly known as: ANUSOL-HC     Januvia 100 MG tablet  Generic drug: SITagliptin     Levothyroxine Sodium 137 MCG Caps     losartan 100 MG tablet  Commonly known as: COZAAR     metFORMIN 1000 MG tablet  Commonly known as: GLUCOPHAGE     metoclopramide 10 MG tablet  Commonly known as: REGLAN  Take 1 tablet by mouth every 6 hours as needed (nausea/vomiting)     omeprazole 40 MG delayed release capsule  Commonly known as: PRILOSEC  Take 1 capsule by mouth every morning (before breakfast) Open capsule post-op     ondansetron 4 MG tablet  Commonly known as: Zofran  Take 1 tablet by mouth every 4 hours as needed for Nausea or Vomiting     TEGRETOL PO     vitamin D3 10 MCG (400 UNIT) Tabs tablet  Commonly known as: CHOLECALCIFEROL        STOP taking these medications    TOUJEO SOLOSTAR SC           Where to Get Your Medications      These medications were sent to [facility], [place] - [address].  Atif Feliz [phone] Radha Myers [phone]  [address] [place] [address].[facility] [address]    Phone: [phone]   · ketorolac 10 MG tablet         Follow-up:  in the next few weeks with Adore Henry ALESSIA Sinha - NP  Follow up: in 1 week in weight management with Dr. Elgin Hernández MD/Agustina Reid, [address], APRN - CNP, CNP   Electronincally signed 3/31/2022 at 6:52 PM    A total of 35 minutes was spent in preparing the patient for discharge with greater than 50% of the time involved with education, counseling and coordinating care

## 2022-04-04 ENCOUNTER — OFFICE VISIT (OUTPATIENT)
Dept: BARIATRICS/WEIGHT MGMT | Age: 57
End: 2022-04-04

## 2022-04-04 VITALS
RESPIRATION RATE: 18 BRPM | WEIGHT: 264 LBS | BODY MASS INDEX: 40.01 KG/M2 | SYSTOLIC BLOOD PRESSURE: 110 MMHG | HEART RATE: 72 BPM | DIASTOLIC BLOOD PRESSURE: 72 MMHG | HEIGHT: 68 IN | TEMPERATURE: 97.2 F

## 2022-04-04 DIAGNOSIS — F41.9 ANXIETY: ICD-10-CM

## 2022-04-04 DIAGNOSIS — K58.9 IRRITABLE BOWEL SYNDROME, UNSPECIFIED TYPE: ICD-10-CM

## 2022-04-04 DIAGNOSIS — E78.00 HYPERCHOLESTEREMIA: ICD-10-CM

## 2022-04-04 DIAGNOSIS — Z99.89 OSA ON CPAP: ICD-10-CM

## 2022-04-04 DIAGNOSIS — K76.0 FATTY LIVER DISEASE, NONALCOHOLIC: ICD-10-CM

## 2022-04-04 DIAGNOSIS — F32.A DEPRESSION, UNSPECIFIED DEPRESSION TYPE: ICD-10-CM

## 2022-04-04 DIAGNOSIS — E03.9 HYPOTHYROIDISM, UNSPECIFIED TYPE: ICD-10-CM

## 2022-04-04 DIAGNOSIS — G47.33 OSA ON CPAP: ICD-10-CM

## 2022-04-04 DIAGNOSIS — E66.9 DIABETES MELLITUS TYPE 2 IN OBESE (HCC): ICD-10-CM

## 2022-04-04 DIAGNOSIS — Z90.3 S/P GASTRIC SLEEVE PROCEDURE: Primary | ICD-10-CM

## 2022-04-04 DIAGNOSIS — I10 HYPERTENSION, UNSPECIFIED TYPE: ICD-10-CM

## 2022-04-04 DIAGNOSIS — K21.9 GASTROESOPHAGEAL REFLUX DISEASE, UNSPECIFIED WHETHER ESOPHAGITIS PRESENT: ICD-10-CM

## 2022-04-04 DIAGNOSIS — E11.69 DIABETES MELLITUS TYPE 2 IN OBESE (HCC): ICD-10-CM

## 2022-04-04 PROCEDURE — 99024 POSTOP FOLLOW-UP VISIT: CPT | Performed by: PHYSICIAN ASSISTANT

## 2022-04-04 NOTE — PATIENT INSTRUCTIONS
 Stay well hydrated. Drink a minimum of 64 oz of non-carbonated, non-caffeinated fluids daily.  Nutritional education occurred during visit. Tolerating diet. Continue following  with dietitian and follow their recommendations as  directed. Continue  60-80  grams of protein each day.   Signs and symptoms reviewed with patient that would be concerning and need her to return to office for re-evaluation. Patient will call if any questions or  concerns arrise.  No lifting, pushing, pulling over 20#   No abdominal exercises   Wear abdominal binder prn   Complete Lovenox as rx- take once daily   Importance of physical activity discussed with patient. Advised to increase activity as tolerated.  Continue taking Multivitamin. Start taking Calcium and B12 as directed   Start taking Omeprazole daily-first thing in the morning on an empty stomach. Open and put in spoon.  Encouraged to attend support groups   May start soft foods over weekend (Starting 4/9/22) - yogurt, cottage cheese, mashed potatoes, applesauce   Follow-up in 1 week with provider and dietitian   Wear CPAP Qhs- adjust pressure with pulmonary as needed   Monitor blood sugars- adjust medication with PCP as needed   Monitor BP- adjust medication with PCP as needed.

## 2022-04-04 NOTE — PROGRESS NOTES
[facility]  [address]  [address] [facility]      Visit Date:  4/4/2022  Weight Management Postop Follow-up    HPI:      Cassandra Cary is a 61 y.o. female who is here today for 1 weeks follow up since  robotic-assisted sleeve gastrectomy performed by Dr. Joel Marino  on 3/28/22. Pt reports that she is doing well. Has gotten better with getting in fluids/protein as the week has gone on. Pt reports that she is drinking close to 64 oz of fluid and 62 grams of protein daily. Tolerating post-op diet. No issue with bowel movements since she started taking stool softeners. Intermittnet nausea. No emesis. Reports heartburn, but has not been taking Omeprazole. Advised to start. Denies sx of dehydration. No fever or chills. Denies CP/SOB. No Abdominal pain. Minimal incisional tenderness. Taking CelebrateONE 45 MV. Has not been taking Calcium or B12- advised to start. Taking Toradol Qhs prn. Taking Lovenox twice daily-advised to only take one daily as prescribed. Total weight loss since surgery is 6 pounds. Not wearing CPAP- advised to start. Sugars running 150-200. Off all DM medications currently. A1C with PCP 4/1/22 was 5.5. Will continue to monitor and adjust medication with PCP as needed. BP stable with medication. Taking thyroid medication, as rx. Depression and anxiety stable with medication. Physical Activity:  Walking    Days per week: daily   Time per session: 5 minutes every 1-2 hours    Current BMI: Body mass index is 40.14 kg/m².   Current Weight:   Wt Readings from Last 3 Encounters:   04/04/22 264 lb (119.7 kg)   03/28/22 270 lb 6.4 oz (122.7 kg)   02/28/22 278 lb (126.1 kg)     Pre-op Body Weight:270      Past Medical History:  Past Medical History:   Diagnosis Date    Body mass index 45.0-49.9, adult ([facility] [address] 01/25/2022    Cancer (HCC)     skin basal cell    Depression     GERD (gastroesophageal reflux disease)     History of Insufficiently Active    Days of Exercise per Week: 4 days    Minutes of Exercise per Session: 20 min   Stress: Stress Concern Present    Feeling of Stress : To some extent   Social Connections: Socially Integrated    Frequency of Communication with Friends and Family: More than three times a week    Frequency of Social Gatherings with Friends and Family: Twice a week    Attends [employer] Services: More than 4 times per year    Active Member of [address] [place] [address] [employer] or Organizations:  Yes    Attends Club or Organization Meetings: More than 4 times per year    Marital Status: Living with partner   Intimate Partner Violence: Not At Risk    Fear of Current or Ex-Partner: No    Emotionally Abused: No    Physically Abused: No    Sexually Abused: No   Housing Stability: Low Risk     Unable to Pay for Housing in the Last Year: No    Number of [place] in the Last Year: 1    Unstable Housing in the Last Year: No        Medications:   Current Outpatient Medications   Medication Sig Dispense Refill    Calcium Citrate-Vitamin D (CALCIUM + VIT D, BARIATRIC ADVANTAGE, CHEWABLE TABLET) Take 1 tablet by mouth daily      Cyanocobalamin (B-12 SL) Place under the tongue      Multiple Vitamins-Minerals (CELEBRATE MULTI-COMPLETE 39) CHEW Take by mouth      ketorolac (TORADOL) 10 MG tablet Take 1 tablet by mouth every 6 hours as needed for Pain 20 tablet 0    vitamin D3 (CHOLECALCIFEROL) 10 MCG (400 UNIT) TABS tablet Take 1 tablet by mouth daily      Docusate Sodium 100 MG TABS Take 100 mg by mouth 2 times daily Take as needed to prevent constipation 30 tablet 1    enoxaparin (LOVENOX) 40 MG/0.4ML injection Inject 0.4 mLs into the skin daily for 14 days 14 each 0    metoclopramide (REGLAN) 10 MG tablet Take 1 tablet by mouth every 6 hours as needed (nausea/vomiting) 30 tablet 0    ondansetron (ZOFRAN) 4 MG tablet Take 1 tablet by mouth every 4 hours as needed for Nausea or Vomiting 30 tablet 0    hydrocortisone (ANUSOL-HC) 2.5 % CREA rectal cream Place rectally 2 times daily as needed       losartan (COZAAR) 100 MG tablet Take 100 mg by mouth daily      gabapentin (NEURONTIN) 600 MG tablet Take 600 mg by mouth 2 times daily.  atorvastatin (LIPITOR) 40 MG tablet Take 40 mg by mouth nightly       hydroCHLOROthiazide (HYDRODIURIL) 50 MG tablet Take 50 mg by mouth daily       cloNIDine (CATAPRES) 0.1 MG tablet Take 0.1 mg by mouth 2 times daily      Levothyroxine Sodium 137 MCG CAPS Take 137 mcg by mouth Daily       Citalopram Hydrobromide (CELEXA PO) Take 20 mg by mouth nightly       CarBAMazepine (TEGRETOL PO) Take 200 mg by mouth 2 times daily       AMLODIPINE BESYLATE PO Take by mouth daily       omeprazole (PRILOSEC) 40 MG delayed release capsule Take 1 capsule by mouth every morning (before breakfast) Open capsule post-op (Patient not taking: Reported on 4/4/2022) 30 capsule 2    fluticasone-vilanterol (BREO ELLIPTA) 100-25 MCG/INH AEPB inhaler Inhale 1 puff into the lungs daily as needed  (Patient not taking: Reported on 4/4/2022)      ELDERBERRY PO Take 2 tablets by mouth daily (Patient not taking: Reported on 4/4/2022)      metFORMIN (GLUCOPHAGE) 1000 MG tablet TAKE 1 TABLET BY MOUTH TWICE DAILY WITH MORNING AND EVENING MEALS (Patient not taking: Reported on 4/4/2022)      SITagliptin (JANUVIA) 100 MG tablet Take 100 mg by mouth daily (Patient not taking: Reported on 4/4/2022)      DICLOFENAC PO Take 50 mg by mouth 2 times daily  (Patient not taking: Reported on 4/4/2022)      aspirin 81 MG tablet Take 81 mg by mouth daily. (Patient not taking: Reported on 4/4/2022)       No current facility-administered medications for this visit. Allergies: Allergies   Allergen Reactions    Ampicillin Hives    Motrin [Ibuprofen] Hives     Brand Name Motrin; GENERICS ARE OK       Subjective:    Review of Systems:  Constitutional: Denies any fever, chills, fatigue.   Wound: Denies any rash, skin color changes or wound problems. Resp: Denies any cough, shortness of breath. CV: Denies any chest pain, orthopnea or syncope. MS: Denies myalgias, arthralgias. GI: Denies any nausea, vomiting, diarrhea, constipation, melena, hematochezia. No incisional discomfort. : Denies any hematuria, hesitancy or dysuria. NEURO: Denies seizures, headache. Objective:    /72 (Site: Right Upper Arm, Position: Sitting, Cuff Size: Large Adult)   Pulse 72   Temp 97.2 °F (36.2 °C) (Infrared)   Resp 18   Ht 5' 8\" (1.727 m)   Wt 264 lb (119.7 kg)   BMI 40.14 kg/m²     Physical Examination:   Constitutional: Alert and oriented to person, place and time. Well-developed, well- nourished. Head: Normocephalic and atraumatic  Neck: Supple. Eyes: EOMI b/l. Conjunctivae normal.  No scleral icterus. Respiratory: Effort normal. No respiratory distress. Abd:Steri-strips removed. Incisions are well healed. Non-tender. No sign of infection. Ext:  Movement x 4. No edema  Skin; Warm and dry, no visible rashes, lesions or ulcers.    Neuro: Cranial Nerves Grossly Intact; nml coordination    Labs:  CBC   Lab Results   Component Value Date    WBC 7.7 09/10/2020    RBC 4.41 09/10/2020    HGB 11.4 03/29/2022    HCT 34.5 03/29/2022    MCV 93.9 09/10/2020    MCH 29.7 09/10/2020    MCHC 31.6 09/10/2020    RDW 13.8 09/10/2020     09/10/2020    MPV 11.8 09/10/2020    RBCMORP NOT REPORTED 09/10/2020    MONOPCT 7 09/10/2020    LYMPHSABS 2.42 09/10/2020    MONOSABS 0.50 09/10/2020    EOSABS 0.67 09/10/2020    BASOSABS 0.06 09/10/2020        BMP/CMP   Lab Results   Component Value Date    GLUCOSE 122 03/29/2022    CREATININE 0.7 03/29/2022    BUN 16 03/29/2022     03/29/2022    K 3.9 03/29/2022     03/29/2022    CO2 22 03/29/2022    CALCIUM 9.6 03/29/2022    AST 50 09/10/2020    ALKPHOS 73 09/10/2020    PROT 7.5 09/10/2020    LABALBU 4.3 09/10/2020    BILITOT 0.39 09/10/2020    ALT 48 09/10/2020        PREALBUMIN   No results found for: PREALBUMIN     VITAMIN B12   No results found for: [id number]     24 HOUR URINE CALCIUM   No results found for: Theadora Endo, CALCIUMUR     VITAMIN D   No results found for: VITD25     VITAMIN B1/ THIAMINE   No results found for: [id number]     RBC FOLATE   No results found for: FOLATE     LIPID SCREEN (FASTING)   Lab Results   Component Value Date    HDL 36 09/10/2020   ,     HGA1C (T2DM ONLY)   No results found for: LABA1C, AVGG     TSH   Lab Results   Component Value Date    TSH 2.31 09/10/2020        IRON   No results found for: IRON     IONIZED CALCIUM   No results found for: BEL LOWERY      Assessment:       Diagnosis Orders   1. S/P gastric sleeve procedure     2. BMI 40.0-44.9, adult ([facility] [address]     3. Depression, unspecified depression type     4. Anxiety     5. Diabetes mellitus type 2 in obese ([facility] [address]     6. Hypertension, unspecified type     7. Hypothyroidism, unspecified type     8. DIANNE on CPAP     9. Fatty liver disease, nonalcoholic     10. Hypercholesteremia     11. Gastroesophageal reflux disease, unspecified whether esophagitis present     12. Irritable bowel syndrome, unspecified type       Plan:     Stay well hydrated. Drink a minimum of 64 oz of non-carbonated, non-caffeinated fluids daily.  Nutritional education occurred during visit. Tolerating diet. Continue following  with dietitian and follow their recommendations as  directed. Continue  60-80  grams of protein each day.   Signs and symptoms reviewed with patient that would be concerning and need her to return to office for re-evaluation. Patient will call if any questions or  concerns arrise.  No lifting, pushing, pulling over 20#   No abdominal exercises   Wear abdominal binder prn   Complete Lovenox as rx- take once daily   Importance of physical activity discussed with patient. Advised to increase activity as tolerated.  Continue taking Multivitamin.  Start taking Calcium and B12 as directed   Start taking Omeprazole daily-first thing in the morning on an empty stomach. Open and put in spoon.  Encouraged to attend support groups   May start soft foods over weekend AFTER drinking 2 protein shakes (Starting 4/9/22) - yogurt, cottage cheese, mashed potatoes, applesauce   Follow-up in 1 week with provider and dietitian   Wear CPAP Qhs- adjust pressure with pulmonary as needed   Monitor blood sugars- adjust medication with PCP as needed   Monitor BP- adjust medication with PCP as needed. Return in about 1 week (around 4/11/2022) for postop follow up. I spent over 20 minutes with the patient, with greater that 50% of that time spent on face counseling for nutrition and exercise.     Electronically signed by BETSY Kaur on 4/4/2022 at 9:44 AM

## 2022-04-11 NOTE — PROGRESS NOTES
Patient is a 61 y.o. female seen for follow up MNT visit for two week post op. Vitals from current and previous visits:  Vitals 6/96/2133   SYSTOLIC 331   DIASTOLIC 72   Site Right Upper Arm   Position Sitting   Cuff Size Large Adult   Pulse 72   Temp 97.2   Resp 18   SpO2    Weight 254 lb 6.4 oz   Height 5' 8\"   Body mass index 38.68 kg/m2        Recent Post Op Lab Work:    Pt will have lab work done at six week post op    Date of Surgery: 3/28/22  Type of Surgery: gastric sleeve      Initial Weight: 278 lbs at pre-op teaching class   Excess  Body Weight Pre-Op: 112  lbs    Weight Loss: 24 lbs. Patient's Target Weight =  166 lbs for a BMI of ~25  Percentage of Excess Body Weight Lost to date is :  21.4 %    How pleased are you with your current weight loss: Pt is pleased with weight loss      Are you experiencing any physical problems post surgery: Yes- using stool softener as needed to aide bowels, denies heartburn      Protein intake: 60-80 grams/day   Patient taking protein supplement: Yes. Brand of Supplement: Nectar Protein Powder with 1% milk BID    Fluid intake: 48-64 oz/day- denies difficulty with water intake    Multivitamin/mineral intake: Celebrate One 45 chewable once a day    Calcium intake: Yes. 500 mg Calcium Citrate soft chew TID- advised pt can go down to BID- cherry flavor    Other: B12 daily    Assessment  Pt tolerating clear and full liquids without difficulty. Supplementing with protein shakes to meet protein goal of ~ 60-80 grams daily  Pt taking vitamins as recommended      Plan  Plan/Recommendations: Educated pt on two week post op nutrition guidelines. Sample menus, recipes, and restaurant card given to patient. See Additional Instructions below. -  Patient Instructions   1. Begin to set aside three meal times per day about 1/4 cup portion size.     Important to have set meal times so can still focus on adequate fluid intake between meals and get your protein in.  2.  Now is when you want to separate your liquids from solids. No liquids 30 minutes before your meals and no liquids 30 minutes after your meals. Water goal at least 64 oz per day  3. Continue to drink protein shakes between meals as can not get enough protein from food alone at this time. Goal is 60-80 grams protein per day. 4.  Continue taking bariatric vitamins as currently taking. Get  your six week post op lab work completed 5-7 days prior to next office visit.         Recommended Follow-Up: six week post op with PA and RD    Bibi Roman RD, LD,   Dietitian- Weight Management [address] [place] [address]

## 2022-04-12 ENCOUNTER — OFFICE VISIT (OUTPATIENT)
Dept: BARIATRICS/WEIGHT MGMT | Age: 57
End: 2022-04-12

## 2022-04-12 VITALS
DIASTOLIC BLOOD PRESSURE: 72 MMHG | TEMPERATURE: 97.2 F | RESPIRATION RATE: 18 BRPM | HEART RATE: 72 BPM | HEIGHT: 68 IN | SYSTOLIC BLOOD PRESSURE: 110 MMHG | BODY MASS INDEX: 38.55 KG/M2 | WEIGHT: 254.4 LBS

## 2022-04-12 DIAGNOSIS — Z98.84 STATUS POST BARIATRIC SURGERY: Primary | ICD-10-CM

## 2022-04-12 DIAGNOSIS — E78.00 HYPERCHOLESTEREMIA: ICD-10-CM

## 2022-04-12 DIAGNOSIS — K58.9 IRRITABLE BOWEL SYNDROME, UNSPECIFIED TYPE: ICD-10-CM

## 2022-04-12 DIAGNOSIS — F32.A DEPRESSION, UNSPECIFIED DEPRESSION TYPE: ICD-10-CM

## 2022-04-12 DIAGNOSIS — K76.0 FATTY LIVER DISEASE, NONALCOHOLIC: ICD-10-CM

## 2022-04-12 DIAGNOSIS — E11.69 DIABETES MELLITUS TYPE 2 IN OBESE (HCC): ICD-10-CM

## 2022-04-12 DIAGNOSIS — Z99.89 OSA ON CPAP: ICD-10-CM

## 2022-04-12 DIAGNOSIS — Z13.21 SCREENING FOR MALNUTRITION: ICD-10-CM

## 2022-04-12 DIAGNOSIS — G47.33 OSA ON CPAP: ICD-10-CM

## 2022-04-12 DIAGNOSIS — E03.9 HYPOTHYROIDISM, UNSPECIFIED TYPE: ICD-10-CM

## 2022-04-12 DIAGNOSIS — E66.9 DIABETES MELLITUS TYPE 2 IN OBESE (HCC): ICD-10-CM

## 2022-04-12 DIAGNOSIS — I10 HYPERTENSION, UNSPECIFIED TYPE: ICD-10-CM

## 2022-04-12 DIAGNOSIS — Z90.3 S/P GASTRIC SLEEVE PROCEDURE: Primary | ICD-10-CM

## 2022-04-12 DIAGNOSIS — K91.2 POSTSURGICAL MALABSORPTION: ICD-10-CM

## 2022-04-12 DIAGNOSIS — K21.9 GASTROESOPHAGEAL REFLUX DISEASE, UNSPECIFIED WHETHER ESOPHAGITIS PRESENT: ICD-10-CM

## 2022-04-12 DIAGNOSIS — F41.9 ANXIETY: ICD-10-CM

## 2022-04-12 PROCEDURE — 99024 POSTOP FOLLOW-UP VISIT: CPT | Performed by: PHYSICIAN ASSISTANT

## 2022-04-12 NOTE — PROGRESS NOTES
[facility]  [address], [address]  [place]      Visit Date:  4/12/2022  Weight Management Postop Follow-up    HPI:      Robby Wayne is a 61 y.o. female who is here today for 2 weeks follow up since  robotic-assisted sleeve gastrectomy performed by Dr. Juan Carlos Richards  on 3/28/22. Reports that she is doing well overall. Staying on track with nutrition. Weight today 254#. Down 10# since last visit. Down 16# since surgery. BMI 38 . Drinking 64 oz of fluid and 65-75 grams of protein daily. Drinking 2 protein shakes daily made with 1% milk. No carbonation. No sweets. Tolerating mashed potatoes, applesauce, yogurt. No problems with bowel movements as long as taking stool softeners. Nausea resolved. No emesis. No GERD/ Reflux since starting Omeprazole. Denies CP/SOB. No Dizziness since cutting BP medication in half. No abdominal pain. No incisional discomfort. No sx of dehydration. No fever or chills. Completed Lovenox, as rx. Taking Toradol Qhs only. Taking and tolerating all vitamins- CelebrateONE 45/ calcium 2-3 daily/ B12. Wearing CPAP Qhs. Off all DM medications. Checking blood sugars and running 180 currently- following closely with PCP. A1C in office was 5.5. Taking thyroid medication daily. Depression and anxiety stable with medication. Physical Activity:  Walking 10-45 minutes daily. Current BMI: Body mass index is 38.68 kg/m².   Current Weight:   Wt Readings from Last 3 Encounters:   04/12/22 254 lb 6.4 oz (115.4 kg)   04/04/22 264 lb (119.7 kg)   03/28/22 270 lb 6.4 oz (122.7 kg)     Pre-op Body Weight:270      Past Medical History:  Past Medical History:   Diagnosis Date    Body mass index 45.0-49.9, adult ([facility] [address] 01/25/2022    Cancer (HCC)     skin basal cell    Depression     GERD (gastroesophageal reflux disease)     History of palpitations     Hyperlipidemia     Hypertension     Hypothyroidism     Irritable bowel syndrome     DIANNE on CPAP     Osteoarthritis     BACK AND KNEES    PONV (postoperative nausea and vomiting)     Prolonged emergence from general anesthesia     Type II or unspecified type diabetes mellitus without mention of complication, not stated as uncontrolled        Past Surgical History:  Past Surgical History:   Procedure Laterality Date    CHOLECYSTECTOMY      COLONOSCOPY  longer than 3 yr ago    [facility]    ENDOSCOPY, COLON, DIAGNOSTIC      KNEE ARTHROSCOPY Left     x2    OTHER SURGICAL HISTORY      knee ablation     SLEEVE GASTRECTOMY N/A 3/28/2022    ROBOTIC GASTRECTOMY SLEEVE performed by Jarocho Murcia MD at [facility]         Past Social History:  Social History     Socioeconomic History    Marital status:      Spouse name: Not on file    Number of children: Not on file    Years of education: Not on file    Highest education level: Not on file   Occupational History    Not on file   Tobacco Use    Smoking status: Former Smoker     Packs/day: 2.00     Types: Cigarettes     Quit date: 4/15/1991     Years since quittin.0    Smokeless tobacco: Never Used   Vaping Use    Vaping Use: Never used   Substance and Sexual Activity    Alcohol use: Not Currently    Drug use: Never    Sexual activity: Not on file   Other Topics Concern    Not on file   Social History Narrative    Not on file     Social Determinants of Health     Financial Resource Strain: Medium Risk    Difficulty of Paying Living Expenses: Somewhat hard   Food Insecurity: Food Insecurity Present    Worried About Running Out of Food in the Last Year: Sometimes true    Claudia of Food in the Last Year: Sometimes true   Transportation Needs: No Transportation Needs    Lack of Transportation (Medical): No    Lack of Transportation (Non-Medical):  No   Physical Activity: Insufficiently Active    Days of Exercise per Week: 4 days    Minutes of Exercise per Session: 20 min   Stress: Stress Concern Present    Feeling of Stress : To some extent   Social Connections: Socially Integrated    Frequency of Communication with Friends and Family: More than three times a week    Frequency of Social Gatherings with Friends and Family: Twice a week    Attends Church Services: More than 4 times per year    Active Member of [address] [place] [address] [employer] or Organizations: Yes    Attends Club or Organization Meetings: More than 4 times per year    Marital Status: Living with partner   Intimate Partner Violence: Not At Risk    Fear of Current or Ex-Partner: No    Emotionally Abused: No    Physically Abused: No    Sexually Abused: No   Housing Stability: Low Risk     Unable to Pay for Housing in the Last Year: No    Number of [place] in the Last Year: 1    Unstable Housing in the Last Year: No        Medications:   Current Outpatient Medications   Medication Sig Dispense Refill    Calcium Citrate-Vitamin D (CALCIUM + VIT D, BARIATRIC ADVANTAGE, CHEWABLE TABLET) Take 1 tablet by mouth 2 times daily       Cyanocobalamin (B-12 SL) Place under the tongue      Multiple Vitamins-Minerals (CELEBRATE MULTI-COMPLETE 39) CHEW Take by mouth      ketorolac (TORADOL) 10 MG tablet Take 1 tablet by mouth every 6 hours as needed for Pain 20 tablet 0    Docusate Sodium 100 MG TABS Take 100 mg by mouth 2 times daily Take as needed to prevent constipation 30 tablet 1    omeprazole (PRILOSEC) 40 MG delayed release capsule Take 1 capsule by mouth every morning (before breakfast) Open capsule post-op 30 capsule 2    losartan (COZAAR) 100 MG tablet Take 100 mg by mouth daily Taking half      gabapentin (NEURONTIN) 600 MG tablet Take 600 mg by mouth 2 times daily.        atorvastatin (LIPITOR) 40 MG tablet Take 40 mg by mouth nightly       hydroCHLOROthiazide (HYDRODIURIL) 50 MG tablet Take 50 mg by mouth daily Taking half      cloNIDine (CATAPRES) 0.1 MG tablet Take 0.1 mg by mouth 2 times daily Taking half      Levothyroxine Sodium 137 MCG CAPS Take 137 mcg by mouth Daily       Citalopram Hydrobromide (CELEXA PO) Take 20 mg by mouth nightly       CarBAMazepine (TEGRETOL PO) Take 200 mg by mouth 2 times daily       vitamin D3 (CHOLECALCIFEROL) 10 MCG (400 UNIT) TABS tablet Take 1 tablet by mouth daily      ELDERBERRY PO Take 2 tablets by mouth daily (Patient not taking: Reported on 4/4/2022)      hydrocortisone (ANUSOL-HC) 2.5 % CREA rectal cream Place rectally 2 times daily as needed  (Patient not taking: Reported on 4/12/2022)      SITagliptin (JANUVIA) 100 MG tablet Take 100 mg by mouth daily (Patient not taking: Reported on 4/12/2022)      aspirin 81 MG tablet Take 81 mg by mouth daily. (Patient not taking: Reported on 4/4/2022)       No current facility-administered medications for this visit. Allergies: Allergies   Allergen Reactions    Ampicillin Hives    Motrin [Ibuprofen] Hives     Brand Name Motrin; GENERICS ARE OK       Subjective:    Review of Systems:  Constitutional: Denies any fever, chills, (+)fatigue. Wound: Denies any rash, skin color changes or wound problems. Resp: Denies any cough, shortness of breath. CV: Denies any chest pain, orthopnea or syncope. MS: Denies myalgias,(+) arthralgias. GI: Denies any nausea, vomiting, diarrhea, constipation, melena, hematochezia. No incisional discomfort. : Denies any hematuria, hesitancy or dysuria. NEURO: Denies seizures, headache. Objective:    /72 (Site: Right Upper Arm, Position: Sitting, Cuff Size: Large Adult)   Pulse 72   Temp 97.2 °F (36.2 °C) (Infrared)   Resp 18   Ht 5' 8\" (1.727 m)   Wt 254 lb 6.4 oz (115.4 kg)   BMI 38.68 kg/m²     Physical Examination:   Constitutional: Alert and oriented to person, place and time. Well-developed, well- nourished. Head: Normocephalic and atraumatic  Neck: Supple. Eyes: EOMI b/l. Conjunctivae normal.  No scleral icterus.   Respiratory: Effort normal. No respiratory distress. Abd: Incisions well healed. Non-tender. No sign of infection. Ext:  Movement x 4. No edema  Skin; Warm and dry, no visible rashes, lesions or ulcers. Neuro: Cranial Nerves Grossly Intact; nml coordination    Labs:  CBC   Lab Results   Component Value Date    WBC 7.7 09/10/2020    RBC 4.41 09/10/2020    HGB 11.4 03/29/2022    HCT 34.5 03/29/2022    MCV 93.9 09/10/2020    MCH 29.7 09/10/2020    MCHC 31.6 09/10/2020    RDW 13.8 09/10/2020     09/10/2020    MPV 11.8 09/10/2020    RBCMORP NOT REPORTED 09/10/2020    MONOPCT 7 09/10/2020    LYMPHSABS 2.42 09/10/2020    MONOSABS 0.50 09/10/2020    EOSABS 0.67 09/10/2020    BASOSABS 0.06 09/10/2020        BMP/CMP   Lab Results   Component Value Date    GLUCOSE 122 03/29/2022    CREATININE 0.7 03/29/2022    BUN 16 03/29/2022     03/29/2022    K 3.9 03/29/2022     03/29/2022    CO2 22 03/29/2022    CALCIUM 9.6 03/29/2022    AST 50 09/10/2020    ALKPHOS 73 09/10/2020    PROT 7.5 09/10/2020    LABALBU 4.3 09/10/2020    BILITOT 0.39 09/10/2020    ALT 48 09/10/2020        PREALBUMIN   No results found for: PREALBUMIN     VITAMIN B12   No results found for: [id number]     24 HOUR URINE CALCIUM   No results found for: Theadora Endo, CALCIUMUR     VITAMIN D   No results found for: VITD25     VITAMIN B1/ THIAMINE   No results found for: [id number]     RBC FOLATE   No results found for: FOLATE     LIPID SCREEN (FASTING)   Lab Results   Component Value Date    HDL 36 09/10/2020   ,     HGA1C (T2DM ONLY)   No results found for: LABA1C, AVGG     TSH   Lab Results   Component Value Date    TSH 2.31 09/10/2020        IRON   No results found for: IRON     IONIZED CALCIUM   No results found for: IONCA CAION      Assessment:       Diagnosis Orders   1. S/P gastric sleeve procedure  CBC with Auto Differential    Comprehensive Metabolic Panel    Prealbumin   2.  BMI 38.0-38.9,adult  CBC with Auto Differential    Comprehensive Metabolic Panel    Prealbumin 3. Postsurgical malabsorption  CBC with Auto Differential    Comprehensive Metabolic Panel    Prealbumin   4. Screening for malnutrition  CBC with Auto Differential    Comprehensive Metabolic Panel    Prealbumin   5. Depression, unspecified depression type     6. Anxiety     7. Diabetes mellitus type 2 in obese ([address]     8. Hypertension, unspecified type     9. Hypothyroidism, unspecified type     10. Fatty liver disease, nonalcoholic     11. Hypercholesteremia     12. Gastroesophageal reflux disease, unspecified whether esophagitis present     13. DIANNE on CPAP     14. Irritable bowel syndrome, unspecified type       Plan:    Stay well hydrated. Drink a minimum of 64 oz of non-carbonated, non-caffeinated fluids daily. Nutritional education occurred during visit. Tolerating diet. Continue following with dietitian and follow their recommendations as directed. Continue  60-80  grams of protein each day. Signs and symptoms reviewed with patient that would be concerning and need her to return to office for re-evaluation. Patient will call if any questions or concerns arrise. No lifting, pushing, pulling over 20#  No abdominal exercises  Wear abdominal binder prn  Importance of physical activity discussed with patient. Increase physical activity as tolerated  Continue taking Multivitamin, Calcium and B12 as directed  Continue Omeprazole for at least 3 months post-op. Encouraged to attend support groups  Progress diet as tolerated per dietitian recommendations. 6 week labs ordered- to be drawn one week prior to next apt.  Wear CPAP Qhs- adjust pressure with pulmonary as needed   Monitor blood sugars- adjust medication with PCP as needed   Monitor BP- adjust medication with PCP as needed. Return in about 1 month (around 5/12/2022) for postop follow up. I spent over 20 minutes with the patient, with greater that 50% of that time spent on face counseling for nutrition and exercise.     Electronically signed by BETSY Druon on 4/12/2022 at 1:21 PM

## 2022-04-12 NOTE — PATIENT INSTRUCTIONS
Stay well hydrated. Drink a minimum of 64 oz of non-carbonated, non-caffeinated fluids daily. Nutritional education occurred during visit. Tolerating diet. Continue following with dietitian and follow their recommendations as directed. Continue  60-80  grams of protein each day. Signs and symptoms reviewed with patient that would be concerning and need her to return to office for re-evaluation. Patient will call if any questions or concerns arrise. No lifting, pushing, pulling over 20#  No abdominal exercises  Wear abdominal binder prn  Importance of physical activity discussed with patient. Increase physical activity as tolerated  Continue taking Multivitamin, Calcium and B12 as directed  Continue Omeprazole for at least 3 months post-op. Encouraged to attend support groups  Progress diet as tolerated per dietitian recommendations. 6 week labs ordered- to be drawn one week prior to next apt.  Wear CPAP Qhs- adjust pressure with pulmonary as needed   Monitor blood sugars- adjust medication with PCP as needed   Monitor BP- adjust medication with PCP as needed.

## 2022-05-04 NOTE — PROGRESS NOTES
[facility]  [address], [address]  [place]      Visit Date:  5/5/2022  Weight Management Postop Follow-up    HPI:      Yogi Matson is a 61 y.o. female who is here today for 6 weeks follow up since  robotic-assisted sleeve gastrectomy performed by Dr. Janeen Avila  on 3/28/22. Continues to do well. Feeling really good. Weight today 247#. Down 7# since last visit. Down 23# since surgery. BMI 37 . Drinking around 64 oz of fluid and over 65-75 grams of protein daily. Drinking 1- 2 protein shakes daily. No carbonation. No sweets. Tolerating post-op diet. No problems with bowel movements. No nausea. No emesis. No GERD/ Reflux-continues to take Omeprazole. Denies CP/SOB. No Dizziness. No abdominal pain. No incisional discomfort. No sx of dehydration. No fever or chills. Taking and tolerating all vitamins-[id number]/ Calcium 2 daily and B12. Labs reviewed. Calcium 10.6 in labs -will decrease to 1 supplement daily. Wearing CPAP Qhs. Checking blood sugars and running 160. Following closely with PCP. Last A1C 5.5. Apt in June to repeat A1C. Taking thyroid medication daily. Depression and anxiety stable with medication. BP stable with medication- continues to monitor. Physical Activity:  Walking 15 minutes 2-3 times per week. Plans to increase walking and start getting to the [facility]. Current BMI: Body mass index is 37.56 kg/m².   Current Weight:   Wt Readings from Last 3 Encounters:   05/05/22 247 lb (112 kg)   04/12/22 254 lb 6.4 oz (115.4 kg)   04/04/22 264 lb (119.7 kg)     Pre-op Body Weight:270      Past Medical History:  Past Medical History:   Diagnosis Date    Body mass index 45.0-49.9, adult ([facility] [address] 01/25/2022    Cancer (HCC)     skin basal cell    Depression     GERD (gastroesophageal reflux disease)     History of palpitations     Hyperlipidemia     Hypertension     Hypothyroidism     Irritable bowel syndrome     DIANNE on CPAP 2016    Osteoarthritis     BACK AND KNEES    PONV (postoperative nausea and vomiting)     Prolonged emergence from general anesthesia     Type II or unspecified type diabetes mellitus without mention of complication, not stated as uncontrolled        Past Surgical History:  Past Surgical History:   Procedure Laterality Date    CHOLECYSTECTOMY      COLONOSCOPY  longer than 3 yr ago    [facility]    ENDOSCOPY, COLON, DIAGNOSTIC      KNEE ARTHROSCOPY Left     x2    OTHER SURGICAL HISTORY      knee ablation     SLEEVE GASTRECTOMY N/A 3/28/2022    ROBOTIC GASTRECTOMY SLEEVE performed by Merry Leonard MD at 0 [facility]         Past Social History:  Social History     Socioeconomic History    Marital status:      Spouse name: Not on file    Number of children: Not on file    Years of education: Not on file    Highest education level: Not on file   Occupational History    Not on file   Tobacco Use    Smoking status: Former Smoker     Packs/day: 2.00     Types: Cigarettes     Quit date: 4/15/1991     Years since quittin.0    Smokeless tobacco: Never Used   Vaping Use    Vaping Use: Never used   Substance and Sexual Activity    Alcohol use: Not Currently    Drug use: Never    Sexual activity: Not on file   Other Topics Concern    Not on file   Social History Narrative    Not on file     Social Determinants of Health     Financial Resource Strain: Medium Risk    Difficulty of Paying Living Expenses: Somewhat hard   Food Insecurity: Food Insecurity Present    Worried About Running Out of Food in the Last Year: Sometimes true    Claudia of Food in the Last Year: Sometimes true   Transportation Needs: No Transportation Needs    Lack of Transportation (Medical): No    Lack of Transportation (Non-Medical):  No   Physical Activity: Insufficiently Active    Days of Exercise per Week: 4 days    Minutes of Exercise per Session: 20 min   Stress: Stress Concern Present    Feeling of Stress : To some extent   Social Connections: Socially Integrated    Frequency of Communication with Friends and Family: More than three times a week    Frequency of Social Gatherings with Friends and Family: Twice a week    Attends Latter-day Services: More than 4 times per year    Active Member of [address] [place] [address] or Organizations: Yes    Attends Club or Organization Meetings: More than 4 times per year    Marital Status: Living with partner   Intimate Partner Violence: Not At Risk    Fear of Current or Ex-Partner: No    Emotionally Abused: No    Physically Abused: No    Sexually Abused: No   Housing Stability: Low Risk     Unable to Pay for Housing in the Last Year: No    Number of [place] in the Last Year: 1    Unstable Housing in the Last Year: No        Medications:   Current Outpatient Medications   Medication Sig Dispense Refill    Calcium Citrate-Vitamin D (CALCIUM + VIT D, BARIATRIC ADVANTAGE, CHEWABLE TABLET) Take 1 tablet by mouth 2 times daily       Cyanocobalamin (B-12 SL) Place under the tongue      Multiple Vitamins-Minerals (CELEBRATE MULTI-COMPLETE 39) CHEW Take by mouth      vitamin D3 (CHOLECALCIFEROL) 10 MCG (400 UNIT) TABS tablet Take 1 tablet by mouth daily      Docusate Sodium 100 MG TABS Take 100 mg by mouth 2 times daily Take as needed to prevent constipation 30 tablet 1    omeprazole (PRILOSEC) 40 MG delayed release capsule Take 1 capsule by mouth every morning (before breakfast) Open capsule post-op 30 capsule 2    hydrocortisone (ANUSOL-HC) 2.5 % CREA rectal cream Place rectally 2 times daily as needed       losartan (COZAAR) 100 MG tablet Take 100 mg by mouth daily Taking half      gabapentin (NEURONTIN) 600 MG tablet Take 600 mg by mouth 2 times daily.        atorvastatin (LIPITOR) 40 MG tablet Take 40 mg by mouth nightly       hydroCHLOROthiazide (HYDRODIURIL) 50 MG tablet Take 50 mg by mouth daily Taking half      cloNIDine (CATAPRES) 0.1 MG tablet Take 0.1 mg by mouth 2 times daily Taking half      Levothyroxine Sodium 137 MCG CAPS Take 137 mcg by mouth Daily       Citalopram Hydrobromide (CELEXA PO) Take 20 mg by mouth nightly       CarBAMazepine (TEGRETOL PO) Take 200 mg by mouth 2 times daily        No current facility-administered medications for this visit. Allergies: Allergies   Allergen Reactions    Ampicillin Hives    Motrin [Ibuprofen] Hives     Brand Name Motrin; GENERICS ARE OK       Subjective:    Review of Systems:  Constitutional: Denies any fever, chills, (+)fatigue. Wound: Denies any rash, skin color changes or wound problems. Resp: Denies any cough, shortness of breath. CV: Denies any chest pain, orthopnea or syncope. MS: Denies myalgias,(+) arthralgias. GI: Denies any nausea, vomiting, diarrhea, constipation, melena, hematochezia. No incisional discomfort. : Denies any hematuria, hesitancy or dysuria. NEURO: Denies seizures, headache. Objective:    /72 (Site: Right Upper Arm, Position: Sitting, Cuff Size: Large Adult)   Pulse 72   Temp 97.9 °F (36.6 °C) (Infrared)   Resp 18   Ht 5' 8\" (1.727 m)   Wt 247 lb (112 kg)   BMI 37.56 kg/m²     Physical Examination:   Constitutional: Alert and oriented to person, place and time. Well-developed, well- nourished. Head: Normocephalic and atraumatic  Neck: Supple. Eyes: EOMI b/l. Conjunctivae normal.  No scleral icterus. Respiratory: Effort normal. No respiratory distress. Abd:  Well healed incisions. Non-tender. Ext:  Movement x 4. No edema  Skin; Warm and dry, no visible rashes, lesions or ulcers.    Neuro: Cranial Nerves Grossly Intact; nml coordination    Labs:  CBC   Lab Results   Component Value Date    WBC 7.7 09/10/2020    RBC 4.41 09/10/2020    HGB 11.4 03/29/2022    HCT 34.5 03/29/2022    MCV 93.9 09/10/2020    MCH 29.7 09/10/2020    MCHC 31.6 09/10/2020    RDW 13.8 09/10/2020     09/10/2020    MPV 11.8 09/10/2020    RBCMORP NOT REPORTED 09/10/2020    MONOPCT 7 09/10/2020    LYMPHSABS 2.42 09/10/2020    MONOSABS 0.50 09/10/2020    EOSABS 0.67 09/10/2020    BASOSABS 0.06 09/10/2020        BMP/CMP   Lab Results   Component Value Date    GLUCOSE 122 03/29/2022    CREATININE 0.7 03/29/2022    BUN 16 03/29/2022     03/29/2022    K 3.9 03/29/2022     03/29/2022    CO2 22 03/29/2022    CALCIUM 9.6 03/29/2022    AST 50 09/10/2020    ALKPHOS 73 09/10/2020    PROT 7.5 09/10/2020    LABALBU 4.3 09/10/2020    BILITOT 0.39 09/10/2020    ALT 48 09/10/2020        PREALBUMIN   No results found for: PREALBUMIN     VITAMIN B12   No results found for: [id number]     24 HOUR URINE CALCIUM   No results found for: Isabelle Robert, HOURSC, CALCIUMUR     VITAMIN D   No results found for: VITD25     VITAMIN B1/ THIAMINE   No results found for: [id number]     RBC FOLATE   No results found for: FOLATE     LIPID SCREEN (FASTING)   Lab Results   Component Value Date    HDL 36 09/10/2020   ,     HGA1C (T2DM ONLY)   No results found for: LABA1C, AVGG     TSH   Lab Results   Component Value Date    TSH 2.31 09/10/2020        IRON   No results found for: IRON     IONIZED CALCIUM   No results found for: BEL LOWERY      Assessment:       Diagnosis Orders   1. S/P gastric sleeve procedure  CBC with Auto Differential    Comprehensive Metabolic Panel    Prealbumin    Vitamin B1    Vitamin D 25 Hydroxy   2. BMI 37.0-37.9, adult  CBC with Auto Differential    Comprehensive Metabolic Panel    Prealbumin    Vitamin B1    Vitamin D 25 Hydroxy   3. Postsurgical malabsorption  CBC with Auto Differential    Comprehensive Metabolic Panel    Prealbumin    Vitamin B1    Vitamin D 25 Hydroxy   4. Screening for malnutrition  CBC with Auto Differential    Comprehensive Metabolic Panel    Prealbumin    Vitamin B1    Vitamin D 25 Hydroxy   5. Depression, unspecified depression type     6. Anxiety     7. Diabetes mellitus type 2 in obese ([address]     8.  Hypertension, unspecified type 9. Hypothyroidism, unspecified type     10. Hypercholesteremia     11. Gastroesophageal reflux disease, unspecified whether esophagitis present     12. DIANNE on CPAP       Plan:    Stay well hydrated. Drink a minimum of 64 oz of non-carbonated, non-caffeinated fluids daily. Nutritional education occurred during visit. Tolerating diet. Continue following with dietitian and follow their recommendations as directed. Continue  60-80  grams of protein each day. Signs and symptoms reviewed with patient that would be concerning and need her to return to office for re-evaluation. Patient will call if any questions or concerns arrise. Off all restrictions  Importance of physical activity discussed with patient. Increase physical activity as tolerated  Add strength training  Continue taking Multivitamin, Calcium and B12 as directed  Decrease calcium to 1 daily  Continue Omeprazole for at least 3 months post-op- May swallow capsule without opening  Encouraged to attend support groups  Progress diet as tolerated per dietitian recommendations. 6 week labs reviewed with patient today  12 week labs ordered- to be drawn one week prior to next apt. Follow up in 6 weeks with provider and dietitian   Wear CPAP Qhs- adjust pressure with pulmonary as needed   Monitor blood sugars- adjust medication with PCP as needed   Monitor BP- adjust medication with PCP as needed. Return in about 6 weeks (around 6/16/2022) for postop follow up. I spent over 20 minutes with the patient, with greater that 50% of that time spent on face counseling for nutrition and exercise.     Electronically signed by BETSY Soto on 5/5/2022 at 11:30 AM

## 2022-05-04 NOTE — PROGRESS NOTES
Patient is a 61 y.o. female seen for follow up MNT visit for six week post op. Vitals from current and previous visits:  Vitals 7/1/3302   SYSTOLIC 851   DIASTOLIC 72   Site Right Upper Arm   Position Sitting   Cuff Size Large Adult   Pulse 72   Temp 97.9   Resp 18   SpO2    Weight 247 lb   Height 5' 8\"   Body mass index 37.55 kg/m2        Recent Post Op Lab Work:    6 week post op labs completed . Glucose was 153- follows up with PCP in June and advised to do this- currently off DM medication. Calcium elevated at 10.6     Date of Surgery: 3/28/22  Type of Surgery: gastric sleeve      Initial Weight: 278 lbs at pre-op teaching class   Excess  Body Weight Pre-Op: 112  lbs     Weight Loss: 31 lbs. Patient's Target Weight =  166 lbs for a BMI of ~25  Percentage of Excess Body Weight Lost to date is :  27.6%    How pleased are you with your current weight loss: Pt is pleased with weight loss     Are you experiencing any physical problems post surgery: Does have some constipation but uses stool softener and this helps    Are you experiencing any dietary problems post surgery: No  Food Intolerances: Is not tolerating  -hamburger lola- set heavy on stomach- was not prepared at home    How frequently are you eating? Pt eating twice a day  How long does it take you to finish a meal? Taking time to eat and chewing food well  How full do you feel after eating? Pt states does not feel full after eats but also thinks eating too much at times- requires to focus on mindfulness and drink water 30 minutes after meals    Has had eggs and sausage, beans, tried a ritz cracker, chicken salad, sugar free jello    Protein intake: 60-80 grams/day   Patient taking protein supplement: Yes. Brand of Supplement: Nectar Protein Powder with 1% milk ~ 1-2 times a day.   Gave pt sample of Ensure Max supplement     Fluid intake: states close to 64 oz water a day     Multivitamin/mineral intake: Celebrate One 45 chewable once a day- plans to stay on this for now     Calcium intake: Yes. 500 mg Calcium Citrate soft chew BID- will go down to one chew due to elevated calcium level- changing to Viactiv and to take this with food     Other: B12 daily- pt can stop extra B12 when runs out as receives 500 mcg B12 in bariatric MVI    Plan  Plan/Recommendations: Pt educated on six week post op nutrition guidelines. Questions answered. See additional instructions below. -  Patient Instructions   1. Continue bariatric vitamins as you are currently taking. Ok to switch to one Calcium Viactiv Calcium chew a day with food. Continue One Celebrate 45 vitamin Daily. Can stop extra B12 when finish bottle if stay on current Multivitamin. 2. Goal remains  60-80 grams protein per day. Focus on choosing protein foods first at meals. Suggest continue one-two protein shakes daily to meet this recommendation. 3. Increase physical activity to include cardiac and strength training now that you no longer have weight restrictions  4. Water goal is 64 oz per day and make sure no liquids 30 minutes before meals and no liquids 30 minutes after meals  5. Take 20-30 minutes to eat meals and chew all foods well for best tolerance especially as you introduce new foods. get lab work done 5-7 days before next office visit.           Recommended Follow-Up: three month post op with PA and RD    Dilip Casey RD, LD,   Dietitian- Weight Management [address] [place] [address]

## 2022-05-05 ENCOUNTER — OFFICE VISIT (OUTPATIENT)
Dept: BARIATRICS/WEIGHT MGMT | Age: 57
End: 2022-05-05

## 2022-05-05 VITALS
DIASTOLIC BLOOD PRESSURE: 72 MMHG | TEMPERATURE: 97.9 F | HEART RATE: 72 BPM | SYSTOLIC BLOOD PRESSURE: 112 MMHG | HEIGHT: 68 IN | BODY MASS INDEX: 37.44 KG/M2 | WEIGHT: 247 LBS | RESPIRATION RATE: 18 BRPM

## 2022-05-05 DIAGNOSIS — K21.9 GASTROESOPHAGEAL REFLUX DISEASE, UNSPECIFIED WHETHER ESOPHAGITIS PRESENT: ICD-10-CM

## 2022-05-05 DIAGNOSIS — I10 HYPERTENSION, UNSPECIFIED TYPE: ICD-10-CM

## 2022-05-05 DIAGNOSIS — E78.00 HYPERCHOLESTEREMIA: ICD-10-CM

## 2022-05-05 DIAGNOSIS — K91.2 POSTSURGICAL MALABSORPTION: ICD-10-CM

## 2022-05-05 DIAGNOSIS — E66.9 DIABETES MELLITUS TYPE 2 IN OBESE (HCC): ICD-10-CM

## 2022-05-05 DIAGNOSIS — F41.9 ANXIETY: ICD-10-CM

## 2022-05-05 DIAGNOSIS — F32.A DEPRESSION, UNSPECIFIED DEPRESSION TYPE: ICD-10-CM

## 2022-05-05 DIAGNOSIS — Z13.21 SCREENING FOR MALNUTRITION: ICD-10-CM

## 2022-05-05 DIAGNOSIS — E11.69 DIABETES MELLITUS TYPE 2 IN OBESE (HCC): ICD-10-CM

## 2022-05-05 DIAGNOSIS — G47.33 OSA ON CPAP: ICD-10-CM

## 2022-05-05 DIAGNOSIS — Z99.89 OSA ON CPAP: ICD-10-CM

## 2022-05-05 DIAGNOSIS — E03.9 HYPOTHYROIDISM, UNSPECIFIED TYPE: ICD-10-CM

## 2022-05-05 DIAGNOSIS — Z98.84 STATUS POST BARIATRIC SURGERY: Primary | ICD-10-CM

## 2022-05-05 DIAGNOSIS — Z90.3 S/P GASTRIC SLEEVE PROCEDURE: Primary | ICD-10-CM

## 2022-05-05 PROCEDURE — 99024 POSTOP FOLLOW-UP VISIT: CPT | Performed by: PHYSICIAN ASSISTANT

## 2022-05-05 NOTE — PATIENT INSTRUCTIONS
1. Continue bariatric vitamins as you are currently taking. Ok to switch to one Calcium Viactiv Calcium chew a day with food. Continue One Celebrate 45 vitamin Daily. Can stop extra B12 when finish bottle if stay on current Multivitamin. 2. Goal remains  60-80 grams protein per day. Focus on choosing protein foods first at meals. Suggest continue one-two protein shakes daily to meet this recommendation. 3. Increase physical activity to include cardiac and strength training now that you no longer have weight restrictions  4. Water goal is 64 oz per day and make sure no liquids 30 minutes before meals and no liquids 30 minutes after meals  5. Take 20-30 minutes to eat meals and chew all foods well for best tolerance especially as you introduce new foods. get lab work done 5-7 days before next office visit.

## 2022-05-05 NOTE — PATIENT INSTRUCTIONS
Stay well hydrated. Drink a minimum of 64 oz of non-carbonated, non-caffeinated fluids daily. Nutritional education occurred during visit. Tolerating diet. Continue following with dietitian and follow their recommendations as directed. Continue  60-80  grams of protein each day. Signs and symptoms reviewed with patient that would be concerning and need her to return to office for re-evaluation. Patient will call if any questions or concerns arrise. Off all restrictions  Importance of physical activity discussed with patient. Increase physical activity as tolerated  Add strength training  Continue taking Multivitamin, Calcium and B12 as directed  Decrease calcium to 1 daily  Continue Omeprazole for at least 3 months post-op- May swallow capsule without opening  Encouraged to attend support groups  Progress diet as tolerated per dietitian recommendations. 6 week labs reviewed with patient today  12 week labs ordered- to be drawn one week prior to next apt. Follow up in 6 weeks with provider and dietitian   Wear CPAP Qhs- adjust pressure with pulmonary as needed   Monitor blood sugars- adjust medication with PCP as needed   Monitor BP- adjust medication with PCP as needed.

## 2022-06-23 RX ORDER — OMEPRAZOLE 40 MG/1
CAPSULE, DELAYED RELEASE ORAL
Qty: 30 CAPSULE | Refills: 2 | OUTPATIENT
Start: 2022-06-23

## 2022-07-01 NOTE — PROGRESS NOTES
Patient is a 61 y.o. female seen for follow up MNT visit for three month post op. Vitals from current and previous visits:     Vitals 8/0/6549   SYSTOLIC    DIASTOLIC    Site    Position    Cuff Size    Pulse    Temp    Resp    SpO2    Weight 234 lb 3.2 oz   Height 5' 8\"   Body mass index 35.61 kg/m2     Recent Post Op Lab Work:    6 week post op labs completed . Glucose was 153- follows up with PCP in June and advised to do this- currently off DM medication. Calcium elevated at 10.6   Three month labs- requires to get these done yet    Date of Surgery: 3/28/22  Type of Surgery: gastric sleeve      Initial Weight: 278 lbs at pre-op teaching class   Excess  Body Weight Pre-Op: 112  lbs     Weight Loss: 44 lbs. Patient's Target Weight =  166 lbs for a BMI of ~25  Percentage of Excess Body Weight Lost to date is :  39.2%    How pleased are you with your current weight loss: Pt is pleased with weight loss     Are you experiencing any physical problems post surgery: Some constipation- will take stool softener OTC and this helps and laxative every three days. Some heartburn and reflux on occasion- does take omprozole daily in morning    Are you experiencing any dietary problems post surgery: No  Food Intolerances: Is not tolerating very spicy foods- upsets stomach    How frequently are you eating? Eating three times a day  How long does it take you to finish a meal? Taking time to eat and chewing food well- tries to   How full do you feel after eating? Pt now feels full after eats    Has tried vegetables and fruit- eating carrots and green beans  Tolerating meats without difficulty. States eating protein food first with meals Eating yogurt and peanut butter    Protein intake: 60-80 grams/day   Patient taking protein supplement: Yes. Brand of Supplement: Occasionally but not daily.   Premier protein shakes but not daily     Fluid intake: Drinking ~ 64 oz water a day,  Sugar free iced coffee ~ once a week     Multivitamin/mineral intake: Switched to [place] Complete two weeks ago- once a day- increase to two a day     Calcium intake: Yes. Viactiv soft chew once a day with food       Plan  Plan/Recommendations: See instructions below    Patient Instructions   1. Continue bariatric vitamins as you are currently taking. Increase your [place] Multivitamin to two per day instead of one- make sure it has Iron in it. Continue Vitactiv chew once a day with food for best absorption. 2. Goal continues to be 60-80 grams protein per day. Focus on choosing protein foods first at meals to remain full and maintain muscle mass while you continue to lose from body fat stores. 3. Regular physical activity is important if desire to continue weight loss efforts. Recommend regular cardiac activity and strength training 2-3 days per week. 4.  Water goal is 64 oz per day and make sure no liquids 30 minutes before meals and no liquids 30 minutes after meals  5. Take 20-30 minutes to eat meals and chew all foods well for best tolerance   get lab work done at least 5 -7 days  before next office visit.           Recommended Follow-Up: six month post op with PA 3 month post op with PA later this month    Elizabeth Liu RD, LD,   Dietitian- Weight Management [facility] [address]

## 2022-07-05 ENCOUNTER — OFFICE VISIT (OUTPATIENT)
Dept: BARIATRICS/WEIGHT MGMT | Age: 57
End: 2022-07-05

## 2022-07-05 VITALS — HEIGHT: 68 IN | BODY MASS INDEX: 35.49 KG/M2 | WEIGHT: 234.2 LBS

## 2022-07-05 DIAGNOSIS — Z98.84 STATUS POST BARIATRIC SURGERY: Primary | ICD-10-CM

## 2022-07-08 ENCOUNTER — HOSPITAL ENCOUNTER (OUTPATIENT)
Age: 57
Setting detail: SPECIMEN
Discharge: HOME | End: 2022-07-08

## 2022-07-08 LAB
CHOLESTEROL, FASTING: 148 MG/DL
CHOLESTEROL/HDL RATIO: 3.8
HDLC SERPL-MCNC: 39 MG/DL
LDL CHOLESTEROL: 70 MG/DL (ref 0–130)
TRIGLYCERIDE, FASTING: 194 MG/DL
TSH SERPL DL<=0.05 MIU/L-ACNC: 0.05 UIU/ML (ref 0.3–5)

## 2022-07-09 LAB — THYROXINE, FREE: 1.52 NG/DL (ref 0.93–1.7)

## 2022-07-11 ENCOUNTER — OFFICE VISIT (OUTPATIENT)
Dept: BARIATRICS/WEIGHT MGMT | Age: 57
End: 2022-07-11
Payer: MEDICARE

## 2022-07-11 VITALS
DIASTOLIC BLOOD PRESSURE: 60 MMHG | WEIGHT: 233.2 LBS | TEMPERATURE: 98.5 F | HEIGHT: 68 IN | SYSTOLIC BLOOD PRESSURE: 116 MMHG | BODY MASS INDEX: 35.34 KG/M2 | HEART RATE: 64 BPM

## 2022-07-11 DIAGNOSIS — G47.33 OSA ON CPAP: ICD-10-CM

## 2022-07-11 DIAGNOSIS — E78.00 HYPERCHOLESTEREMIA: ICD-10-CM

## 2022-07-11 DIAGNOSIS — F41.9 ANXIETY: ICD-10-CM

## 2022-07-11 DIAGNOSIS — K91.2 POSTSURGICAL MALABSORPTION: ICD-10-CM

## 2022-07-11 DIAGNOSIS — E66.9 DIABETES MELLITUS TYPE 2 IN OBESE (HCC): ICD-10-CM

## 2022-07-11 DIAGNOSIS — E11.69 DIABETES MELLITUS TYPE 2 IN OBESE (HCC): ICD-10-CM

## 2022-07-11 DIAGNOSIS — E03.9 HYPOTHYROIDISM, UNSPECIFIED TYPE: ICD-10-CM

## 2022-07-11 DIAGNOSIS — F32.A DEPRESSION, UNSPECIFIED DEPRESSION TYPE: ICD-10-CM

## 2022-07-11 DIAGNOSIS — Z99.89 OSA ON CPAP: ICD-10-CM

## 2022-07-11 DIAGNOSIS — Z90.3 S/P GASTRIC SLEEVE PROCEDURE: Primary | ICD-10-CM

## 2022-07-11 DIAGNOSIS — Z13.21 SCREENING FOR MALNUTRITION: ICD-10-CM

## 2022-07-11 DIAGNOSIS — K21.9 GASTROESOPHAGEAL REFLUX DISEASE, UNSPECIFIED WHETHER ESOPHAGITIS PRESENT: ICD-10-CM

## 2022-07-11 DIAGNOSIS — I10 HYPERTENSION, UNSPECIFIED TYPE: ICD-10-CM

## 2022-07-11 PROCEDURE — 99213 OFFICE O/P EST LOW 20 MIN: CPT | Performed by: PHYSICIAN ASSISTANT

## 2022-07-11 RX ORDER — ASPIRIN 81 MG
100 TABLET, DELAYED RELEASE (ENTERIC COATED) ORAL 2 TIMES DAILY
Qty: 30 TABLET | Refills: 1 | Status: SHIPPED | OUTPATIENT
Start: 2022-07-11

## 2022-07-11 RX ORDER — OMEPRAZOLE 40 MG/1
40 CAPSULE, DELAYED RELEASE ORAL
Qty: 30 CAPSULE | Refills: 2 | Status: SHIPPED | OUTPATIENT
Start: 2022-07-11 | End: 2022-09-29

## 2022-07-11 NOTE — PROGRESS NOTES
[facility]  [address]  [place]      Visit Date:  7/11/2022  Weight Management Postop Follow-up    HPI:      Lai Meade is a 61 y.o. female who is here today for 3 month follow up since  robotic-assisted sleeve gastrectomy performed by Dr. Laura Owens  on 3/28/22. Reports that she doing well. Feeling good overall. Energy better. Currently on crutches due to left knee/ankle injury. No fx. Currently using ice, elevation and tylenol. Improving. Weight today 233#. Down 14# since last visit. Down 37# since surgery. BMI 35 . Drinking 64 oz of fluid and 60 grams of protein daily. Drinking 1 protein shakes daily. No carbonation. No sweets. Tolerating post-op diet. No problems with bowel movements. Occasional use of stool softeners. No nausea. No emesis. Rare  GERD/ Reflux-continues to take Omeprazole every other day. Denies CP/SOB. No Dizziness. No abdominal pain. No incisional discomfort. No sx of dehydration. .  Taking and tolerating all vitamins- [place] with Iron 2 daily/ Viactive once daily. Repeat calcium 10.2 ( down from 10.6) will monitor. Wearing CPAP Qhs. Depression and anxiety stable with medication. Following thyroid with PCP. Glucose in labs 198- restarted Januvia last week. Since starting medication blood sugars have dropped to 160.  3 month labs reviewed. Seca scale completed and reviewed. Physical Activity: Walking 30 minutes/ arm weights  3-4 times per week ( except last week due to fall/ injury)    Current BMI: Body mass index is 35.46 kg/m².   Current Weight:   Wt Readings from Last 3 Encounters:   07/11/22 233 lb 3.2 oz (105.8 kg)   07/05/22 234 lb 3.2 oz (106.2 kg)   05/05/22 247 lb (112 kg)     Pre-op Body Weight:270      Past Medical History:  Past Medical History:   Diagnosis Date    Body mass index 45.0-49.9, adult ([facility] [address] 01/25/2022    Cancer ([facility] [address]     skin basal cell    Depression     GERD (gastroesophageal reflux disease)     History of palpitations     Hyperlipidemia     Hypertension     Hypothyroidism     Irritable bowel syndrome     DIANNE on CPAP     Osteoarthritis     BACK AND KNEES    PONV (postoperative nausea and vomiting)     Prolonged emergence from general anesthesia     Type II or unspecified type diabetes mellitus without mention of complication, not stated as uncontrolled        Past Surgical History:  Past Surgical History:   Procedure Laterality Date    CHOLECYSTECTOMY      COLONOSCOPY  longer than 3 yr ago    [facility]    ENDOSCOPY, COLON, DIAGNOSTIC      KNEE ARTHROSCOPY Left     x2    OTHER SURGICAL HISTORY      knee ablation     SLEEVE GASTRECTOMY N/A 3/28/2022    ROBOTIC GASTRECTOMY SLEEVE performed by Bandar Nix MD at [address] [place] [address]         Past Social History:  Social History     Socioeconomic History    Marital status:      Spouse name: Not on file    Number of children: Not on file    Years of education: Not on file    Highest education level: Not on file   Occupational History    Not on file   Tobacco Use    Smoking status: Former Smoker     Packs/day: 2.00     Types: Cigarettes     Quit date: 4/15/1991     Years since quittin.2    Smokeless tobacco: Never Used   Vaping Use    Vaping Use: Never used   Substance and Sexual Activity    Alcohol use: Not Currently    Drug use: Never    Sexual activity: Not on file   Other Topics Concern    Not on file   Social History Narrative    Not on file     Social Determinants of Health     Financial Resource Strain: Medium Risk    Difficulty of Paying Living Expenses: Somewhat hard   Food Insecurity: Food Insecurity Present    Worried About Running Out of Food in the Last Year: Sometimes true    Claudia of Food in the Last Year: Sometimes true   Transportation Needs: No Transportation Needs    Lack of Transportation (Medical):  No    Lack of Transportation (Non-Medical): No   Physical Activity: Insufficiently Active    Days of Exercise per Week: 4 days    Minutes of Exercise per Session: 20 min   Stress: Stress Concern Present    Feeling of Stress : To some extent   Social Connections: Socially Integrated    Frequency of Communication with Friends and Family: More than three times a week    Frequency of Social Gatherings with Friends and Family: Twice a week    Attends Druze Services: More than 4 times per year    Active Member of [address] [place] [address] [employer] or Organizations: Yes    Attends Club or Organization Meetings: More than 4 times per year    Marital Status: Living with partner   Intimate Partner Violence: Not At Risk    Fear of Current or Ex-Partner: No    Emotionally Abused: No    Physically Abused: No    Sexually Abused: No   Housing Stability: Low Risk     Unable to Pay for Housing in the Last Year: No    Number of [place] in the Last Year: 1    Unstable Housing in the Last Year: No        Medications:   Current Outpatient Medications   Medication Sig Dispense Refill    Calcium-Cholecalciferol-Zinc (VIACTIV CALCIUM IMMUNE) 650-20-5.5 MG-MCG-MG CHEW Take 1 tablet by mouth daily      Docusate Sodium 100 MG TABS Take 100 mg by mouth 2 times daily Take as needed to prevent constipation 30 tablet 1    omeprazole (PRILOSEC) 40 MG delayed release capsule Take 1 capsule by mouth every morning (before breakfast) 30 capsule 2    vitamin D3 (CHOLECALCIFEROL) 10 MCG (400 UNIT) TABS tablet Take 1 tablet by mouth daily      hydrocortisone (ANUSOL-HC) 2.5 % CREA rectal cream Place rectally 2 times daily as needed       losartan (COZAAR) 100 MG tablet Take 50 mg by mouth daily Taking half      gabapentin (NEURONTIN) 600 MG tablet Take 600 mg by mouth 3 times daily.        atorvastatin (LIPITOR) 40 MG tablet Take 40 mg by mouth nightly       hydroCHLOROthiazide (HYDRODIURIL) 50 MG tablet Take 50 mg by mouth daily Taking half      cloNIDine (CATAPRES) 0.1 MG tablet Take 0.1 mg by mouth 2 times daily Taking half      Levothyroxine Sodium 137 MCG CAPS Take 137 mcg by mouth Daily       Citalopram Hydrobromide (CELEXA PO) Take 20 mg by mouth nightly       CarBAMazepine (TEGRETOL PO) Take 200 mg by mouth 2 times daily        No current facility-administered medications for this visit. Allergies: Allergies   Allergen Reactions    Ampicillin Hives    Motrin [Ibuprofen] Hives     Brand Name Motrin; GENERICS ARE OK       Subjective:    Review of Systems:  Constitutional: Denies any fever, chills, (+)fatigue. Wound: Denies any rash, skin color changes or wound problems. Resp: Denies any cough, shortness of breath. CV: Denies any chest pain, orthopnea or syncope. MS: Denies myalgias,(+) arthralgias. GI: Denies any nausea, vomiting, diarrhea, constipation, melena, hematochezia. No incisional discomfort. (+) reflux  : Denies any hematuria, hesitancy or dysuria. NEURO: Denies seizures, headache. Objective:    /60 (Site: Right Upper Arm, Position: Sitting, Cuff Size: Large Adult)   Pulse 64   Temp 98.5 °F (36.9 °C) (Oral)   Ht 5' 8\" (1.727 m)   Wt 233 lb 3.2 oz (105.8 kg)   BMI 35.46 kg/m²     Physical Examination:   Constitutional: Alert and oriented to person, place and time. Well-developed, well- nourished. Head: Normocephalic and atraumatic  Neck: Supple. Eyes: EOMI b/l. Conjunctivae normal.  No scleral icterus. Respiratory: Effort normal. No respiratory distress. Abd: Benign  Ext:  Movement x 4. No edema  Skin; Warm and dry, no visible rashes, lesions or ulcers.    Neuro: Cranial Nerves Grossly Intact; nml coordination      Labs:  CBC   Lab Results   Component Value Date/Time    WBC 7.7 09/10/2020 10:55 AM    RBC 4.41 09/10/2020 10:55 AM    HGB 11.4 03/29/2022 06:05 AM    HCT 34.5 03/29/2022 06:05 AM    MCV 93.9 09/10/2020 10:55 AM    MCH 29.7 09/10/2020 10:55 AM    MCHC 31.6 09/10/2020 10:55 AM    RDW 13.8 09/10/2020 10:55 AM     B1    Vitamin D 25 Hydroxy   3. Postsurgical malabsorption  CBC with Auto Differential    Comprehensive Metabolic Panel    Ferritin    Hemoglobin A1C    Iron    Iron Binding Capacity    Prealbumin    PTH, Intact    Vitamin B1    Vitamin D 25 Hydroxy   4. Screening for malnutrition  CBC with Auto Differential    Comprehensive Metabolic Panel    Ferritin    Hemoglobin A1C    Iron    Iron Binding Capacity    Prealbumin    PTH, Intact    Vitamin B1    Vitamin D 25 Hydroxy   5. Depression, unspecified depression type     6. Anxiety     7. Diabetes mellitus type 2 in obese ([address]     8. Hypertension, unspecified type     9. Hypothyroidism, unspecified type  TSH with Reflex   10. Hypercholesteremia     11. Gastroesophageal reflux disease, unspecified whether esophagitis present  omeprazole (PRILOSEC) 40 MG delayed release capsule   12. DIANNE on CPAP       Plan:    Stay well hydrated. Drink a minimum of 64 oz of non-carbonated, non-caffeinated fluids daily. Nutritional education occurred during visit. Tolerating diet. Continue following with dietitian and follow their recommendations as directed. Continue  60-80  grams of protein each day. Continue to track. Signs and symptoms reviewed with patient that would be concerning and need her to return to office for re-evaluation. Patient will call if any questions or concerns arrise. Importance of physical activity discussed with patient. Continue physical activity and strength training  Continue taking Multivitamin, Calcium- once daily. Continue Omeprazole-rx sent   Encouraged to attend support groups  3 month labs reviewed with patient today  6 month labs ordered- to be drawn one week prior to next apt. SECA scale completed and reviewed with patient today.  Wear CPAP Qhs- adjust pressure with pulmonary as needed   Monitor blood sugars- adjust medication with PCP as needed   Monitor BP- adjust medication with PCP as needed.     Return in about 3 months (around

## 2022-07-11 NOTE — PATIENT INSTRUCTIONS
Stay well hydrated. Drink a minimum of 64 oz of non-carbonated, non-caffeinated fluids daily. Nutritional education occurred during visit. Tolerating diet. Continue following with dietitian and follow their recommendations as directed. Continue  60-80  grams of protein each day. Continue to track. Signs and symptoms reviewed with patient that would be concerning and need her to return to office for re-evaluation. Patient will call if any questions or concerns arrise. Importance of physical activity discussed with patient. Continue physical activity and strength training  Continue taking Multivitamin, Calcium- once daily. Continue Omeprazole-rx sent   Encouraged to attend support groups  3 month labs reviewed with patient today  6 month labs ordered- to be drawn one week prior to next apt. SECA scale completed and reviewed with patient today.  Wear CPAP Qhs- adjust pressure with pulmonary as needed   Monitor blood sugars- adjust medication with PCP as needed   Monitor BP- adjust medication with PCP as needed.

## 2022-09-29 ENCOUNTER — OFFICE VISIT (OUTPATIENT)
Dept: BARIATRICS/WEIGHT MGMT | Age: 57
End: 2022-09-29
Payer: MEDICARE

## 2022-09-29 VITALS
HEIGHT: 68 IN | WEIGHT: 231.4 LBS | BODY MASS INDEX: 35.07 KG/M2 | SYSTOLIC BLOOD PRESSURE: 122 MMHG | TEMPERATURE: 97.8 F | HEART RATE: 76 BPM | DIASTOLIC BLOOD PRESSURE: 74 MMHG

## 2022-09-29 DIAGNOSIS — E66.9 DIABETES MELLITUS TYPE 2 IN OBESE (HCC): ICD-10-CM

## 2022-09-29 DIAGNOSIS — E78.00 HYPERCHOLESTEREMIA: ICD-10-CM

## 2022-09-29 DIAGNOSIS — K21.9 GASTROESOPHAGEAL REFLUX DISEASE, UNSPECIFIED WHETHER ESOPHAGITIS PRESENT: ICD-10-CM

## 2022-09-29 DIAGNOSIS — G47.33 OSA ON CPAP: ICD-10-CM

## 2022-09-29 DIAGNOSIS — E83.52 SERUM CALCIUM ELEVATED: ICD-10-CM

## 2022-09-29 DIAGNOSIS — Z90.3 S/P GASTRIC SLEEVE PROCEDURE: Primary | ICD-10-CM

## 2022-09-29 DIAGNOSIS — Z99.89 OSA ON CPAP: ICD-10-CM

## 2022-09-29 DIAGNOSIS — F41.9 ANXIETY: ICD-10-CM

## 2022-09-29 DIAGNOSIS — E11.69 DIABETES MELLITUS TYPE 2 IN OBESE (HCC): ICD-10-CM

## 2022-09-29 DIAGNOSIS — F32.A DEPRESSION, UNSPECIFIED DEPRESSION TYPE: ICD-10-CM

## 2022-09-29 DIAGNOSIS — E03.9 HYPOTHYROIDISM, UNSPECIFIED TYPE: ICD-10-CM

## 2022-09-29 DIAGNOSIS — I10 HYPERTENSION, UNSPECIFIED TYPE: ICD-10-CM

## 2022-09-29 PROCEDURE — 99213 OFFICE O/P EST LOW 20 MIN: CPT | Performed by: PHYSICIAN ASSISTANT

## 2022-09-29 RX ORDER — LEVOTHYROXINE SODIUM 0.12 MG/1
TABLET ORAL
COMMUNITY
Start: 2022-07-11

## 2022-09-29 RX ORDER — D-METHORPHAN/PE/ACETAMINOPHEN 10-5-325MG
2 CAPSULE ORAL DAILY
COMMUNITY

## 2022-09-29 RX ORDER — SITAGLIPTIN 100 MG/1
TABLET, FILM COATED ORAL
COMMUNITY
Start: 2022-07-11

## 2022-09-29 RX ORDER — ALBUTEROL SULFATE 90 UG/1
AEROSOL, METERED RESPIRATORY (INHALATION)
COMMUNITY
Start: 2022-07-31

## 2022-09-29 NOTE — PATIENT INSTRUCTIONS
Stay well hydrated. Drink a minimum of 64 oz of non-carbonated, non-caffeinated fluids daily. Nutritional education occurred during visit. Tolerating diet. Continue following with dietitian and follow their recommendations as directed. Continue  60-80 grams of protein each day. Signs and symptoms reviewed with patient that would be concerning and need her to return to office for re-evaluation. Patient will call if any questions or concerns arrise. Importance of physical activity discussed with patient. Continue taking Multivitamin  Encouraged to attend support groups  6 month labs reviewed with patient today  Calcium 10.6- Will hold Viactive and repeat calcium in 6 weeks. If still elevated will follow up with PCP. A1C 7.9- will follow up with PCP in October as scheduled. SECA scale completed and reviewed with patient today  Measurements completed and reviewed with patient today  Wear CPAP Qhs- adjust pressure with pulmonary as needed  Monitor blood sugars- adjust medication with PCP as needed  Monitor BP- adjust medication with PCP as needed. Advised kitchen clean out.    Avoid slider foods/ sweets

## 2022-09-29 NOTE — PROGRESS NOTES
[facility]  [address], [address]  [place]      Visit Date:  9/29/2022  Weight Management Postop Follow-up    HPI:      Wilbur Hollins is a 61 y.o. female who is here today for 6 month follow up since  robotic-assisted sleeve gastrectomy performed by Dr. Honey Lacey  on 3/28/22. Weight today 231#. Down 2# since last visit. Down 39# since surgery. BMI 35 . Drinking plenty of fluids. Staying hydrated. Getting in plenty of protein. Drinking 1 protein shake daily. Eating 2-3 meals daily. Admits that she has been eating more sweets/slider food recently. Advised kitchen clean out. Encouraged to avoid. No carbonation. Tolerating post-op diet. No problems with bowel movements. Taking stool softener as needed only. No nausea. No emesis. Rare GERD- only with trigger foods. Off Prilosec for the last month. Denies CP/SOB. No Dizziness. No abdominal pain. Taking and tolerating all vitamins- [place] with Iron 2 daily/ Viactive calcium 1 daily. Labs reviewed. Calcium back up to 10.6 despite decreasing Calcium to once daily. Does get plenty of calcium in diet- 650mg in protein shake/ yogurt/ milk/ cheese etc.  Will stop Viactive. If calcium continues to be elevated will follow up with PCP. Wearing CPAP Qhs. Depression and anxiety stable with medication. BP stable with medication. Thyroid stable with medication. Monitoring blood sugars- running 180-220. A1C 7.9- continues Januvia. Has a follow up with PCP in October. Seca scale completed and reviewed- down 7# of fat/ increased 5# of fat-free mass/increased 1# muscle mass/ decreased visceral fat by 1.5#. Physical Activity: Walking 30 minutes 3-4 times per week/weight lifting 5# 3-4 times per week. Current BMI: Body mass index is 35.18 kg/m².   Current Weight:   Wt Readings from Last 3 Encounters:   09/29/22 231 lb 6.4 oz (105 kg)   07/11/22 233 lb 3.2 oz (105.8 kg)   07/05/22 234 lb 3.2 oz (106.2 kg) Pre-op Body Weight:270      Past Medical History:  Past Medical History:   Diagnosis Date    Body mass index 45.0-49.9, adult ([facility] [address] 2022    Cancer (HCC)     skin basal cell    Depression     GERD (gastroesophageal reflux disease)     History of palpitations     Hyperlipidemia     Hypertension     Hypothyroidism     Irritable bowel syndrome     DIANNE on CPAP     Osteoarthritis     BACK AND KNEES    PONV (postoperative nausea and vomiting)     Prolonged emergence from general anesthesia     Type II or unspecified type diabetes mellitus without mention of complication, not stated as uncontrolled        Past Surgical History:  Past Surgical History:   Procedure Laterality Date    CHOLECYSTECTOMY      COLONOSCOPY  longer than 3 yr ago    [facility]    ENDOSCOPY, COLON, DIAGNOSTIC      KNEE ARTHROSCOPY Left     x2    OTHER SURGICAL HISTORY      knee ablation     SLEEVE GASTRECTOMY N/A 3/28/2022    ROBOTIC GASTRECTOMY SLEEVE performed by Bandar Nix MD at [address]         Past Social History:  Social History     Socioeconomic History    Marital status:      Spouse name: Not on file    Number of children: Not on file    Years of education: Not on file    Highest education level: Not on file   Occupational History    Not on file   Tobacco Use    Smoking status: Former     Packs/day: 2.00     Types: Cigarettes     Quit date: 4/15/1991     Years since quittin.4    Smokeless tobacco: Never   Vaping Use    Vaping Use: Never used   Substance and Sexual Activity    Alcohol use: Not Currently    Drug use: Never    Sexual activity: Not on file   Other Topics Concern    Not on file   Social History Narrative    Not on file     Social Determinants of Health     Financial Resource Strain: Medium Risk    Difficulty of Paying Living Expenses: Somewhat hard   Food Insecurity: Food Insecurity Present    Worried About Running Out of Food in the Last Year: Sometimes true    Ran Out of Food in the Last Year: Sometimes true   Transportation Needs: No Transportation Needs    Lack of Transportation (Medical): No    Lack of Transportation (Non-Medical): No   Physical Activity: Insufficiently Active    Days of Exercise per Week: 4 days    Minutes of Exercise per Session: 20 min   Stress: Stress Concern Present    Feeling of Stress :  To some extent   Social Connections: Socially Integrated    Frequency of Communication with Friends and Family: More than three times a week    Frequency of Social Gatherings with Friends and Family: Twice a week    Attends Jain Services: More than 4 times per year    Active Member of [address] [place] [address] Eligible or Organizations: Yes    Attends Club or Organization Meetings: More than 4 times per year    Marital Status: Living with partner   Intimate Partner Violence: Not At Risk    Fear of Current or Ex-Partner: No    Emotionally Abused: No    Physically Abused: No    Sexually Abused: No   Housing Stability: Low Risk     Unable to Pay for Housing in the Last Year: No    Number of [place] in the Last Year: 1    Unstable Housing in the Last Year: No        Medications:   Current Outpatient Medications   Medication Sig Dispense Refill    JANUVIA 100 MG tablet TAKE 1 TABLET BY MOUTH ONCE DAILY      Pediatric Multiple Vit-C-FA (FLINSTONES GUMMIES OMEGA-3 DHA) CHEW Take 2 tablets by mouth daily      levothyroxine (SYNTHROID) 125 MCG tablet TAKE ONE TABLET BY MOUTH ONCE DAILY 30 MINUTES PRIOR TO ANY FOOD OR DRINK, ON AN EMPTY STOMACH      albuterol sulfate HFA (PROVENTIL;VENTOLIN;PROAIR) 108 (90 Base) MCG/ACT inhaler 2 PUFFS EVERY 4-6 HOURS AS NEEDED FOR COUGH, WHEEZING, OR SHORTNESS OF BREATH      Calcium-Cholecalciferol-Zinc (VIACTIV CALCIUM IMMUNE) 650-20-5.5 MG-MCG-MG CHEW Take 1 tablet by mouth daily      Docusate Sodium 100 MG TABS Take 100 mg by mouth 2 times daily Take as needed to prevent constipation 30 tablet 1    vitamin D3 (CHOLECALCIFEROL) 10 MCG (400 UNIT) TABS tablet Take 1 tablet by mouth daily      hydrocortisone (ANUSOL-HC) 2.5 % CREA rectal cream Place rectally 2 times daily as needed       losartan (COZAAR) 100 MG tablet Take 50 mg by mouth daily Taking half      gabapentin (NEURONTIN) 600 MG tablet Take 600 mg by mouth 3 times daily. atorvastatin (LIPITOR) 40 MG tablet Take 40 mg by mouth nightly       hydroCHLOROthiazide (HYDRODIURIL) 50 MG tablet Take 50 mg by mouth daily Taking half      cloNIDine (CATAPRES) 0.1 MG tablet Take 0.1 mg by mouth 2 times daily Taking half      Citalopram Hydrobromide (CELEXA PO) Take 20 mg by mouth nightly       CarBAMazepine (TEGRETOL PO) Take 200 mg by mouth 2 times daily        No current facility-administered medications for this visit. Allergies: Allergies   Allergen Reactions    Ampicillin Hives    Motrin [Ibuprofen] Hives     Brand Name Motrin; GENERICS ARE OK       Subjective:    Review of Systems:  Constitutional: Denies any fever, chills, (+)fatigue. Wound: Denies any rash, skin color changes or wound problems. Resp: Denies any cough, shortness of breath. CV: Denies any chest pain, orthopnea or syncope. MS: Denies myalgias,(+) arthralgias. GI: Denies any nausea, vomiting, diarrhea, constipation, melena, hematochezia. No incisional discomfort. (+) reflux  : Denies any hematuria, hesitancy or dysuria. NEURO: Denies seizures, headache. Objective:    /74 (Site: Right Upper Arm, Position: Sitting, Cuff Size: Large Adult)   Pulse 76   Temp 97.8 °F (36.6 °C) (Oral)   Ht 5' 8\" (1.727 m)   Wt 231 lb 6.4 oz (105 kg)   BMI 35.18 kg/m²     Physical Examination:   Constitutional: Alert and oriented to person, place and time. Well-developed, well- nourished. Head: Normocephalic and atraumatic  Neck: Supple. Eyes: EOMI b/l. Conjunctivae normal.  No scleral icterus. Respiratory: Effort normal. No respiratory distress. Abd: Benign  Ext:  Movement x 4. No edema  Skin;  Warm and dry, no visible rashes, lesions or ulcers. Neuro: Cranial Nerves Grossly Intact; nml coordination    Labs:  CBC   Lab Results   Component Value Date/Time    WBC 7.7 09/10/2020 10:55 AM    RBC 4.41 09/10/2020 10:55 AM    HGB 11.4 03/29/2022 06:05 AM    HCT 34.5 03/29/2022 06:05 AM    MCV 93.9 09/10/2020 10:55 AM    MCH 29.7 09/10/2020 10:55 AM    MCHC 31.6 09/10/2020 10:55 AM    RDW 13.8 09/10/2020 10:55 AM     09/10/2020 10:55 AM    MPV 11.8 09/10/2020 10:55 AM    RBCMORP NOT REPORTED 09/10/2020 10:55 AM    MONOPCT 7 09/10/2020 10:55 AM    LYMPHSABS 2.42 09/10/2020 10:55 AM    MONOSABS 0.50 09/10/2020 10:55 AM    EOSABS 0.67 09/10/2020 10:55 AM    BASOSABS 0.06 09/10/2020 10:55 AM        BMP/CMP   Lab Results   Component Value Date/Time    GLUCOSE 122 03/29/2022 06:05 AM    CREATININE 0.7 03/29/2022 06:05 AM    BUN 16 03/29/2022 06:05 AM     03/29/2022 06:05 AM    K 3.9 03/29/2022 06:05 AM     03/29/2022 06:05 AM    CO2 22 03/29/2022 06:05 AM    CALCIUM 9.6 03/29/2022 06:05 AM    AST 50 09/10/2020 10:55 AM    ALKPHOS 73 09/10/2020 10:55 AM    PROT 7.5 09/10/2020 10:55 AM    LABALBU 4.3 09/10/2020 10:55 AM    BILITOT 0.39 09/10/2020 10:55 AM    ALT 48 09/10/2020 10:55 AM        PREALBUMIN   No results found for: PREALBUMIN     VITAMIN B12   No results found for: [id number]     24 HOUR URINE CALCIUM   No results found for: Fanny Yolie, CALCIUMUR     VITAMIN D   No results found for: VITD25     VITAMIN B1/ THIAMINE   No results found for: [id number]     RBC FOLATE   No results found for: FOLATE     LIPID SCREEN (FASTING)   Lab Results   Component Value Date/Time    HDL 39 07/08/2022 12:15 PM   ,     HGA1C (T2DM ONLY)   No results found for: LABA1C, AVGG     TSH   Lab Results   Component Value Date/Time    TSH 0.05 07/08/2022 12:15 PM        IRON   No results found for: IRON     IONIZED CALCIUM   No results found for: BEL LOWERY      Assessment:       Diagnosis Orders   1. S/P gastric sleeve procedure        2. BMI 35.0-35.9,adult        3. Depression, unspecified depression type        4. Anxiety        5. Diabetes mellitus type 2 in obese ([address]        6. Hypertension, unspecified type        7. Hypothyroidism, unspecified type        8. Hypercholesteremia        9. DIANNE on CPAP        10. Gastroesophageal reflux disease, unspecified whether esophagitis present        11. Serum calcium elevated  Calcium, Ionized          Plan:    Stay well hydrated. Drink a minimum of 64 oz of non-carbonated, non-caffeinated fluids daily. Nutritional education occurred during visit. Tolerating diet. Continue following with dietitian and follow their recommendations as directed. Continue  60-80 grams of protein each day. Signs and symptoms reviewed with patient that would be concerning and need her to return to office for re-evaluation. Patient will call if any questions or concerns arrise. Importance of physical activity discussed with patient. Continue taking Multivitamin  Encouraged to attend support groups  6 month labs reviewed with patient today  Calcium 10.6- Will hold Viactive and repeat calcium in 6 weeks. If still elevated will follow up with PCP. A1C 7.9- will follow up with PCP in October as scheduled. SECA scale completed and reviewed with patient today  Measurements completed and reviewed with patient today  Wear CPAP Qhs- adjust pressure with pulmonary as needed  Monitor blood sugars- adjust medication with PCP as needed  Monitor BP- adjust medication with PCP as needed. Advised kitchen clean out. Avoid slider foods/ sweets  Return in about 3 months (around 12/29/2022) for postop follow up. I spent over 20 minutes with the patient, with greater that 50% of that time spent on face counseling for nutrition and exercise.     Electronically signed by BETSY Escobar on 9/29/2022 at 1:33 PM

## 2023-01-12 ENCOUNTER — OFFICE VISIT (OUTPATIENT)
Dept: BARIATRICS/WEIGHT MGMT | Age: 58
End: 2023-01-12

## 2023-01-12 VITALS
HEIGHT: 68 IN | SYSTOLIC BLOOD PRESSURE: 118 MMHG | WEIGHT: 231.6 LBS | HEART RATE: 64 BPM | BODY MASS INDEX: 35.1 KG/M2 | DIASTOLIC BLOOD PRESSURE: 76 MMHG | TEMPERATURE: 97.2 F

## 2023-01-12 DIAGNOSIS — E11.69 DIABETES MELLITUS TYPE 2 IN OBESE (HCC): ICD-10-CM

## 2023-01-12 DIAGNOSIS — Z90.3 S/P GASTRIC SLEEVE PROCEDURE: Primary | ICD-10-CM

## 2023-01-12 DIAGNOSIS — F41.9 ANXIETY: ICD-10-CM

## 2023-01-12 DIAGNOSIS — E66.9 DIABETES MELLITUS TYPE 2 IN OBESE (HCC): ICD-10-CM

## 2023-01-12 DIAGNOSIS — Z13.21 SCREENING FOR MALNUTRITION: ICD-10-CM

## 2023-01-12 DIAGNOSIS — K21.9 GASTROESOPHAGEAL REFLUX DISEASE, UNSPECIFIED WHETHER ESOPHAGITIS PRESENT: ICD-10-CM

## 2023-01-12 DIAGNOSIS — K91.2 POSTSURGICAL MALABSORPTION: ICD-10-CM

## 2023-01-12 DIAGNOSIS — E03.9 HYPOTHYROIDISM, UNSPECIFIED TYPE: ICD-10-CM

## 2023-01-12 DIAGNOSIS — G47.33 OSA ON CPAP: ICD-10-CM

## 2023-01-12 DIAGNOSIS — F32.A DEPRESSION, UNSPECIFIED DEPRESSION TYPE: ICD-10-CM

## 2023-01-12 DIAGNOSIS — I10 HYPERTENSION, UNSPECIFIED TYPE: ICD-10-CM

## 2023-01-12 DIAGNOSIS — Z99.89 OSA ON CPAP: ICD-10-CM

## 2023-01-12 DIAGNOSIS — E78.00 HYPERCHOLESTEREMIA: ICD-10-CM

## 2023-01-12 RX ORDER — GLIPIZIDE 5 MG/1
TABLET ORAL
COMMUNITY
Start: 2023-01-11

## 2023-01-12 NOTE — PATIENT INSTRUCTIONS
Stay well hydrated. Drink a minimum of 64 oz of non-carbonated, non-caffeinated fluids daily. Nutritional education occurred during visit. Tolerating diet. Continue following with dietitian and follow their recommendations as directed. Continue  60-80 grams of protein each day. Signs and symptoms reviewed with patient that would be concerning and need her to return to office for re-evaluation. Patient will call if any questions or concerns arrise. Importance of physical activity discussed with patient. Increase physical activity as tolerated  Increase strength training, as tolerated  Continue taking Multivitamin  Encouraged to attend support groups  1 year labs ordered- to be drawn prior to next apt  SECA scale next visit  Measurements next visit  Dietitian follow up at next visit  Return in about 3 months   Wear CPAP Qhs- adjust pressure with pulmonary as needed  Monitor blood sugars- adjust medication with PCP as needed- next apt 1/23/23  Monitor BP- adjust medication with PCP as needed. Advised kitchen clean out. Avoid slider foods/ sweets/cookies  Increase water to at least 64oz daily  Track  protein an verify at least 60 grams daily.

## 2023-01-12 NOTE — PROGRESS NOTES
[facility]  [address], [address]  [facility], [place]      Visit Date:  1/12/2023  Weight Management Postop Follow-up    HPI:      Belle Cornell is a 61 y.o. female who is here today for 9 month follow up since  robotic-assisted sleeve gastrectomy performed by Dr. Raheel Michelle  on 3/28/22. Weight today 231#. Weight is stable since last visit. Down 39# since surgery. BMI 35 . Drinking 50-54 oz of fluid daily. Believes that she is getting in plenty of protein but has not been tracking. Typically eating 2-3 meals and 1 snack. Reports that she is trying to focus on protein when eating. No pop/ carbonation. Eating 1-2 cookies twice per week. Tolerating post-op diet. Has not been able to tolerate salad, as expected. Reports intermittent constipation- taking stool softener several times per week. No nausea. No emesis. No GERD/ Reflux. Denies CP/SOB. No Dizziness. No abdominal pain. Taking and tolerating all vitamins-[place] with Iron 2 daily. Has not been taking Calcium in the last 3 months as calcium elevated. Repeat ionized Calcium within normal range. Continues at least 4-6 servings of dairy daily. Wearing CPAP Qhs- sleeping well. Continues Celexa for anxiety/depression. BP stable with medication. Monitoring blood sugars- running around 180's. Glipizide added. Continues Januvia. Following closely with PCP. Next apt 1/23/23. Physical Activity: minimal over the last 3 months    Current BMI: Body mass index is 35.21 kg/m².   Current Weight:   Wt Readings from Last 3 Encounters:   01/12/23 231 lb 9.6 oz (105.1 kg)   09/29/22 231 lb 6.4 oz (105 kg)   07/11/22 233 lb 3.2 oz (105.8 kg)     Pre-op Body Weight:270      Past Medical History:  Past Medical History:   Diagnosis Date    Body mass index 45.0-49.9, adult ([facility] [address] 01/25/2022    Cancer (HCC)     skin basal cell    Depression     GERD (gastroesophageal reflux disease)     History of palpitations Hyperlipidemia     Hypertension     Hypothyroidism     Irritable bowel syndrome     DIANNE on CPAP     Osteoarthritis     BACK AND KNEES    PONV (postoperative nausea and vomiting)     Prolonged emergence from general anesthesia     Type II or unspecified type diabetes mellitus without mention of complication, not stated as uncontrolled        Past Surgical History:  Past Surgical History:   Procedure Laterality Date    CHOLECYSTECTOMY      COLONOSCOPY  longer than 3 yr ago    [facility]    ENDOSCOPY, COLON, DIAGNOSTIC      KNEE ARTHROSCOPY Left     x2    OTHER SURGICAL HISTORY      knee ablation     SLEEVE GASTRECTOMY N/A 3/28/2022    ROBOTIC GASTRECTOMY SLEEVE performed by Bladimir Horner MD at [address] [place] [address]         Past Social History:  Social History     Socioeconomic History    Marital status:      Spouse name: Not on file    Number of children: Not on file    Years of education: Not on file    Highest education level: Not on file   Occupational History    Not on file   Tobacco Use    Smoking status: Former     Packs/day: 2.00     Types: Cigarettes     Quit date: 4/15/1991     Years since quittin.7    Smokeless tobacco: Never   Vaping Use    Vaping Use: Never used   Substance and Sexual Activity    Alcohol use: Not Currently    Drug use: Never    Sexual activity: Not on file   Other Topics Concern    Not on file   Social History Narrative    Not on file     Social Determinants of Health     Financial Resource Strain: Not on file   Food Insecurity: Not on file   Transportation Needs: Not on file   Physical Activity: Not on file   Stress: Not on file   Social Connections: Not on file   Intimate Partner Violence: Not on file   Housing Stability: Not on file        Medications:   Current Outpatient Medications   Medication Sig Dispense Refill    glipiZIDE (GLUCOTROL) 5 MG tablet       JANUVIA 100 MG tablet TAKE 1 TABLET BY MOUTH ONCE DAILY      Pediatric Multiple Vitamins (FLINSTONES GUMMIES OMEGA-3 DHA) CHEW Take 2 tablets by mouth daily      levothyroxine (SYNTHROID) 125 MCG tablet TAKE ONE TABLET BY MOUTH ONCE DAILY 30 MINUTES PRIOR TO ANY FOOD OR DRINK, ON AN EMPTY STOMACH      albuterol sulfate HFA (PROVENTIL;VENTOLIN;PROAIR) 108 (90 Base) MCG/ACT inhaler 2 PUFFS EVERY 4-6 HOURS AS NEEDED FOR COUGH, WHEEZING, OR SHORTNESS OF BREATH      Docusate Sodium 100 MG TABS Take 100 mg by mouth 2 times daily Take as needed to prevent constipation 30 tablet 1    vitamin D3 (CHOLECALCIFEROL) 10 MCG (400 UNIT) TABS tablet Take 1 tablet by mouth daily      hydrocortisone (ANUSOL-HC) 2.5 % CREA rectal cream Place rectally 2 times daily as needed       losartan (COZAAR) 100 MG tablet Take 50 mg by mouth daily Taking half      gabapentin (NEURONTIN) 600 MG tablet Take 600 mg by mouth 3 times daily. atorvastatin (LIPITOR) 40 MG tablet Take 40 mg by mouth nightly       hydroCHLOROthiazide (HYDRODIURIL) 50 MG tablet Take 50 mg by mouth daily Taking half      cloNIDine (CATAPRES) 0.1 MG tablet Take 0.1 mg by mouth 2 times daily Taking half      Citalopram Hydrobromide (CELEXA PO) Take 20 mg by mouth nightly       CarBAMazepine (TEGRETOL PO) Take 200 mg by mouth 2 times daily        No current facility-administered medications for this visit. Allergies: Allergies   Allergen Reactions    Ampicillin Hives    Motrin [Ibuprofen] Hives     Brand Name Motrin; GENERICS ARE OK       Subjective:    Review of Systems:  Constitutional: Denies any fever, chills, (+)fatigue. Wound: Denies any rash, skin color changes or wound problems. Resp: Denies any cough, shortness of breath. CV: Denies any chest pain, orthopnea or syncope. MS: Denies myalgias,(+) arthralgias. GI: Denies any nausea, vomiting, diarrhea, constipation, melena, hematochezia. No incisional discomfort. (+) reflux  : Denies any hematuria, hesitancy or dysuria. NEURO: Denies seizures, headache.     Objective:    BP 118/76 (Site: Right Upper Arm, Position: Sitting, Cuff Size: Large Adult)   Pulse 64   Temp 97.2 °F (36.2 °C) (Infrared)   Ht 5' 8\" (1.727 m)   Wt 231 lb 9.6 oz (105.1 kg)   BMI 35.21 kg/m²     Physical Examination:   Constitutional: Alert and oriented to person, place and time. Well-developed, well- nourished. Head: Normocephalic and atraumatic  Neck: Supple. Eyes: EOMI b/l. Conjunctivae normal.  No scleral icterus. Respiratory: Effort normal. No respiratory distress. Abd: Benign  Ext:  Movement x 4. No edema  Skin; Warm and dry, no visible rashes, lesions or ulcers.    Neuro: Cranial Nerves Grossly Intact; nml coordination      Labs:  CBC   Lab Results   Component Value Date/Time    WBC 7.7 09/10/2020 10:55 AM    RBC 4.41 09/10/2020 10:55 AM    HGB 11.4 03/29/2022 06:05 AM    HCT 34.5 03/29/2022 06:05 AM    MCV 93.9 09/10/2020 10:55 AM    MCH 29.7 09/10/2020 10:55 AM    MCHC 31.6 09/10/2020 10:55 AM    RDW 13.8 09/10/2020 10:55 AM     09/10/2020 10:55 AM    MPV 11.8 09/10/2020 10:55 AM    RBCMORP NOT REPORTED 09/10/2020 10:55 AM    MONOPCT 7 09/10/2020 10:55 AM    LYMPHSABS 2.42 09/10/2020 10:55 AM    MONOSABS 0.50 09/10/2020 10:55 AM    EOSABS 0.67 09/10/2020 10:55 AM    BASOSABS 0.06 09/10/2020 10:55 AM        BMP/CMP   Lab Results   Component Value Date/Time    GLUCOSE 122 03/29/2022 06:05 AM    CREATININE 0.7 03/29/2022 06:05 AM    BUN 16 03/29/2022 06:05 AM     03/29/2022 06:05 AM    K 3.9 03/29/2022 06:05 AM     03/29/2022 06:05 AM    CO2 22 03/29/2022 06:05 AM    CALCIUM 9.6 03/29/2022 06:05 AM    AST 50 09/10/2020 10:55 AM    ALKPHOS 73 09/10/2020 10:55 AM    PROT 7.5 09/10/2020 10:55 AM    LABALBU 4.3 09/10/2020 10:55 AM    BILITOT 0.39 09/10/2020 10:55 AM    ALT 48 09/10/2020 10:55 AM        PREALBUMIN   No results found for: PREALBUMIN     VITAMIN B12   No results found for: [id number]     24 HOUR URINE CALCIUM   No results found for: [place], [place], CALCIUMUR     VITAMIN D   No results found for: VITD25     VITAMIN B1/ THIAMINE   No results found for: [id number]     RBC FOLATE   No results found for: FOLATE     LIPID SCREEN (FASTING)   Lab Results   Component Value Date/Time    HDL 39 07/08/2022 12:15 PM   ,     HGA1C (T2DM ONLY)   No results found for: LABA1C, AVGG     TSH   Lab Results   Component Value Date/Time    TSH 0.05 07/08/2022 12:15 PM        IRON   No results found for: IRON     IONIZED CALCIUM   No results found for: BEL LOWERY      Assessment:       Diagnosis Orders   1. S/P gastric sleeve procedure  CBC with Auto Differential    Comprehensive Metabolic Panel    Ferritin    Hemoglobin A1C    Iron    Iron Binding Capacity    IRON SATURATION    Lipid Panel    Prealbumin    PTH, Intact    TSH with Reflex    Vitamin B1    Vitamin B12 & Folate    Vitamin D 25 Hydroxy      2. BMI 35.0-35.9,adult  CBC with Auto Differential    Comprehensive Metabolic Panel    Ferritin    Hemoglobin A1C    Iron    Iron Binding Capacity    IRON SATURATION    Lipid Panel    Prealbumin    PTH, Intact    TSH with Reflex    Vitamin B1    Vitamin B12 & Folate    Vitamin D 25 Hydroxy      3. Postsurgical malabsorption  CBC with Auto Differential    Comprehensive Metabolic Panel    Ferritin    Hemoglobin A1C    Iron    Iron Binding Capacity    IRON SATURATION    Lipid Panel    Prealbumin    PTH, Intact    TSH with Reflex    Vitamin B1    Vitamin B12 & Folate    Vitamin D 25 Hydroxy      4. Screening for malnutrition  CBC with Auto Differential    Comprehensive Metabolic Panel    Ferritin    Hemoglobin A1C    Iron    Iron Binding Capacity    IRON SATURATION    Lipid Panel    Prealbumin    PTH, Intact    TSH with Reflex    Vitamin B1    Vitamin B12 & Folate    Vitamin D 25 Hydroxy      5. Depression, unspecified depression type        6. Anxiety        7. Hypothyroidism, unspecified type  TSH with Reflex      8. Diabetes mellitus type 2 in obese (HCC)  Hemoglobin A1C      9.  Hypertension, unspecified type        10. Hypercholesteremia  Lipid Panel      11. DIANNE on CPAP        12. Gastroesophageal reflux disease, unspecified whether esophagitis present              Plan:    Stay well hydrated. Drink a minimum of 64 oz of non-carbonated, non-caffeinated fluids daily. Nutritional education occurred during visit. Tolerating diet. Continue following with dietitian and follow their recommendations as directed. Continue  60-80 grams of protein each day. Signs and symptoms reviewed with patient that would be concerning and need her to return to office for re-evaluation. Patient will call if any questions or concerns arrise. Importance of physical activity discussed with patient. Increase physical activity as tolerated  Increase strength training, as tolerated  Continue taking Multivitamin  Encouraged to attend support groups  1 year labs ordered- to be drawn prior to next apt  SECA scale next visit  Measurements next visit  Dietitian follow up at next visit  Return in about 3 months   Wear CPAP Qhs- adjust pressure with pulmonary as needed  Monitor blood sugars- adjust medication with PCP as needed- next apt 1/23/23  Monitor BP- adjust medication with PCP as needed. Advised kitchen clean out. Avoid slider foods/ sweets/cookies  Increase water to at least 64oz daily  Track  protein an verify at least 60 grams daily. Return in about 3 months (around 4/12/2023) for postop follow up. I spent over 30 minutes with the patient, with greater that 50% of that time spent on face counseling for nutrition and exercise.     Electronically signed by BETSY Cruz on 1/12/2023 at 10:44 AM

## 2023-09-11 ENCOUNTER — HOSPITAL ENCOUNTER (OUTPATIENT)
Age: 58
Setting detail: SPECIMEN
Discharge: HOME | End: 2023-09-11

## 2023-09-11 LAB
CANDIDA SPECIES: NEGATIVE
GARDNERELLA VAGINALIS: NEGATIVE
SOURCE: NORMAL
TRICHOMONAS: NEGATIVE

## 2023-09-13 LAB
C TRACH DNA SPEC QL PROBE+SIG AMP: NEGATIVE
HPV I/H RISK 4 DNA CVX QL NAA+PROBE: NOT DETECTED
HPV SAMPLE: NORMAL
HPV, INTERPRETATION: NORMAL
HPV16 DNA CVX QL NAA+PROBE: NOT DETECTED
HPV18 DNA CVX QL NAA+PROBE: NOT DETECTED
N GONORRHOEA DNA SPEC QL PROBE+SIG AMP: NEGATIVE
SPECIMEN DESCRIPTION: NORMAL
SPECIMEN DESCRIPTION: NORMAL

## 2023-09-19 LAB — CYTOLOGY REPORT: NORMAL

## 2023-10-27 ENCOUNTER — HOSPITAL ENCOUNTER (OUTPATIENT)
Age: 58
Setting detail: SPECIMEN
Discharge: HOME | End: 2023-10-27

## 2023-10-27 LAB
ALBUMIN SERPL-MCNC: 4.5 G/DL (ref 3.5–5.2)
ALBUMIN/GLOB SERPL: 1.6 {RATIO} (ref 1–2.5)
ALP SERPL-CCNC: 62 U/L (ref 35–104)
ALT SERPL-CCNC: 28 U/L (ref 5–33)
ANION GAP SERPL CALCULATED.3IONS-SCNC: 12 MMOL/L (ref 9–17)
AST SERPL-CCNC: 28 U/L
BASOPHILS # BLD: 0.07 K/UL (ref 0–0.2)
BASOPHILS NFR BLD: 1 % (ref 0–2)
BILIRUB SERPL-MCNC: 0.3 MG/DL (ref 0.3–1.2)
BUN SERPL-MCNC: 11 MG/DL (ref 6–20)
CALCIUM SERPL-MCNC: 9.9 MG/DL (ref 8.6–10.4)
CANDIDA SPECIES: NORMAL
CHLORIDE SERPL-SCNC: 99 MMOL/L (ref 98–107)
CHOLEST SERPL-MCNC: 134 MG/DL
CHOLESTEROL/HDL RATIO: 3.1
CO2 SERPL-SCNC: 26 MMOL/L (ref 20–31)
CREAT SERPL-MCNC: 0.5 MG/DL (ref 0.5–0.9)
EOSINOPHIL # BLD: 0.74 K/UL (ref 0–0.44)
EOSINOPHILS RELATIVE PERCENT: 9 % (ref 1–4)
ERYTHROCYTE [DISTWIDTH] IN BLOOD BY AUTOMATED COUNT: 13.8 % (ref 11.8–14.4)
GARDNERELLA VAGINALIS: NORMAL
GFR SERPL CREATININE-BSD FRML MDRD: >60 ML/MIN/1.73M2
GLUCOSE SERPL-MCNC: 137 MG/DL (ref 70–99)
HCT VFR BLD AUTO: 40.8 % (ref 36.3–47.1)
HDLC SERPL-MCNC: 43 MG/DL
HGB BLD-MCNC: 13.6 G/DL (ref 11.9–15.1)
IMM GRANULOCYTES # BLD AUTO: <0.03 K/UL (ref 0–0.3)
IMM GRANULOCYTES NFR BLD: 0 %
LDLC SERPL CALC-MCNC: 54 MG/DL (ref 0–130)
LYMPHOCYTES NFR BLD: 2.57 K/UL (ref 1.1–3.7)
LYMPHOCYTES RELATIVE PERCENT: 32 % (ref 24–43)
MCH RBC QN AUTO: 30.6 PG (ref 25.2–33.5)
MCHC RBC AUTO-ENTMCNC: 33.3 G/DL (ref 28.4–34.8)
MCV RBC AUTO: 91.7 FL (ref 82.6–102.9)
MONOCYTES NFR BLD: 0.63 K/UL (ref 0.1–1.2)
MONOCYTES NFR BLD: 8 % (ref 3–12)
NEUTROPHILS NFR BLD: 50 % (ref 36–65)
NEUTS SEG NFR BLD: 3.93 K/UL (ref 1.5–8.1)
NRBC BLD-RTO: 0 PER 100 WBC
PLATELET # BLD AUTO: 485 K/UL (ref 138–453)
PMV BLD AUTO: 11.1 FL (ref 8.1–13.5)
POTASSIUM SERPL-SCNC: 4.2 MMOL/L (ref 3.7–5.3)
PROT SERPL-MCNC: 7.3 G/DL (ref 6.4–8.3)
RBC # BLD AUTO: 4.45 M/UL (ref 3.95–5.11)
SODIUM SERPL-SCNC: 137 MMOL/L (ref 135–144)
SOURCE: NORMAL
T4 FREE SERPL-MCNC: 1.3 NG/DL (ref 0.9–1.7)
TRICHOMONAS: NORMAL
TRIGL SERPL-MCNC: 185 MG/DL
TSH SERPL DL<=0.05 MIU/L-ACNC: 0.28 UIU/ML (ref 0.3–5)
WBC OTHER # BLD: 8 K/UL (ref 3.5–11.3)

## 2024-03-28 DIAGNOSIS — E78.00 HYPERCHOLESTEREMIA: ICD-10-CM

## 2024-03-28 DIAGNOSIS — Z13.21 SCREENING FOR MALNUTRITION: ICD-10-CM

## 2024-03-28 DIAGNOSIS — E66.9 DIABETES MELLITUS TYPE 2 IN OBESE: ICD-10-CM

## 2024-03-28 DIAGNOSIS — Z98.84 STATUS POST BARIATRIC SURGERY: Primary | ICD-10-CM

## 2024-03-28 DIAGNOSIS — E03.9 HYPOTHYROIDISM, UNSPECIFIED TYPE: ICD-10-CM

## 2024-03-28 DIAGNOSIS — E11.69 DIABETES MELLITUS TYPE 2 IN OBESE: ICD-10-CM

## 2024-04-05 LAB
BASOPHILS ABSOLUTE: NORMAL
BASOPHILS RELATIVE PERCENT: NORMAL
EOSINOPHILS ABSOLUTE: NORMAL
EOSINOPHILS RELATIVE PERCENT: NORMAL
FERRITIN: 238 NG/ML (ref 9–150)
HCT VFR BLD CALC: 36.2 % (ref 36–46)
HEMOGLOBIN: 12.6 G/DL (ref 12–16)
LYMPHOCYTES ABSOLUTE: NORMAL
LYMPHOCYTES RELATIVE PERCENT: NORMAL
MCH RBC QN AUTO: NORMAL PG
MCHC RBC AUTO-ENTMCNC: NORMAL G/DL
MCV RBC AUTO: NORMAL FL
MONOCYTES ABSOLUTE: NORMAL
MONOCYTES RELATIVE PERCENT: NORMAL
NEUTROPHILS ABSOLUTE: NORMAL
NEUTROPHILS RELATIVE PERCENT: NORMAL
PLATELET # BLD: 465 K/ΜL
PMV BLD AUTO: NORMAL FL
PTH INTACT: 67.6
RBC # BLD: 4.02 10^6/ΜL
WBC # BLD: 7.8 10^3/ML

## 2024-04-08 DIAGNOSIS — E78.00 HYPERCHOLESTEREMIA: ICD-10-CM

## 2024-04-08 DIAGNOSIS — E03.9 HYPOTHYROIDISM, UNSPECIFIED TYPE: ICD-10-CM

## 2024-04-08 DIAGNOSIS — Z98.84 STATUS POST BARIATRIC SURGERY: ICD-10-CM

## 2024-04-08 DIAGNOSIS — E66.9 TYPE 2 DIABETES MELLITUS WITH OBESITY (HCC): ICD-10-CM

## 2024-04-08 DIAGNOSIS — E11.69 TYPE 2 DIABETES MELLITUS WITH OBESITY (HCC): ICD-10-CM

## 2024-04-08 DIAGNOSIS — Z13.21 SCREENING FOR MALNUTRITION: ICD-10-CM

## 2024-04-11 ENCOUNTER — OFFICE VISIT (OUTPATIENT)
Dept: BARIATRICS/WEIGHT MGMT | Age: 59
End: 2024-04-11
Payer: MEDICAID

## 2024-04-11 VITALS
WEIGHT: 233 LBS | HEART RATE: 72 BPM | HEIGHT: 68 IN | SYSTOLIC BLOOD PRESSURE: 124 MMHG | TEMPERATURE: 98.3 F | BODY MASS INDEX: 35.31 KG/M2 | DIASTOLIC BLOOD PRESSURE: 62 MMHG

## 2024-04-11 DIAGNOSIS — F41.9 ANXIETY: ICD-10-CM

## 2024-04-11 DIAGNOSIS — E11.9 DIABETES MELLITUS TYPE II, NON INSULIN DEPENDENT (HCC): ICD-10-CM

## 2024-04-11 DIAGNOSIS — E78.00 HYPERCHOLESTEREMIA: ICD-10-CM

## 2024-04-11 DIAGNOSIS — F32.A DEPRESSION, UNSPECIFIED DEPRESSION TYPE: ICD-10-CM

## 2024-04-11 DIAGNOSIS — E03.9 HYPOTHYROIDISM, UNSPECIFIED TYPE: ICD-10-CM

## 2024-04-11 DIAGNOSIS — Z90.3 S/P GASTRIC SLEEVE PROCEDURE: Primary | ICD-10-CM

## 2024-04-11 DIAGNOSIS — I10 HYPERTENSION, UNSPECIFIED TYPE: ICD-10-CM

## 2024-04-11 DIAGNOSIS — G47.33 OSA ON CPAP: ICD-10-CM

## 2024-04-11 PROCEDURE — 3051F HG A1C>EQUAL 7.0%<8.0%: CPT | Performed by: PHYSICIAN ASSISTANT

## 2024-04-11 PROCEDURE — 3074F SYST BP LT 130 MM HG: CPT | Performed by: PHYSICIAN ASSISTANT

## 2024-04-11 PROCEDURE — 99214 OFFICE O/P EST MOD 30 MIN: CPT | Performed by: PHYSICIAN ASSISTANT

## 2024-04-11 PROCEDURE — 3078F DIAST BP <80 MM HG: CPT | Performed by: PHYSICIAN ASSISTANT

## 2024-04-11 RX ORDER — DULAGLUTIDE 1.5 MG/.5ML
1.5 INJECTION, SOLUTION SUBCUTANEOUS WEEKLY
COMMUNITY
End: 2024-04-11

## 2024-04-11 RX ORDER — CARBAMAZEPINE 200 MG/1
200 TABLET ORAL 2 TIMES DAILY
COMMUNITY
Start: 2024-03-22

## 2024-04-11 RX ORDER — CITALOPRAM 20 MG/1
20 TABLET ORAL DAILY
COMMUNITY
Start: 2024-03-18

## 2024-04-11 RX ORDER — DULAGLUTIDE 3 MG/.5ML
INJECTION, SOLUTION SUBCUTANEOUS WEEKLY
COMMUNITY
Start: 2024-03-18

## 2024-04-11 RX ORDER — PYRIDOXINE HCL (VITAMIN B6) 100 MG
TABLET ORAL DAILY
COMMUNITY

## 2024-04-11 NOTE — PATIENT INSTRUCTIONS
Stay well hydrated. Drink a minimum of 64 oz of non-carbonated, non-caffeinated fluids daily.  Nutritional education occurred during visit. Tolerating diet. Continue following with dietitian and follow their recommendations as directed. Continue  60-80 grams of protein each day.    Signs and symptoms reviewed with patient that would be concerning and need her to return to office for re-evaluation. Patient will call if any questions or concerns arrise.  Importance of physical activity discussed with patient.   Increase physical activity as tolerated  Add strength training  Continue taking Multivitamin/Iron ( goal of 36-45 mg iron daily)  Encouraged to attend support groups  Annual labs reviewed with patient today  SECA scale completed and reviewed with patient today  Measurements completed and reviewed with patient today  Wear CPAP Qhs- adjust pressure with pulmonary as needed  Monitor blood sugars- adjust medication with PCP as needed. Current A1C 7.8.   Monitor BP- adjust medication with PCP as needed.    Avoid slider foods/ sweets/cookies- encouraged kitchen clean out  Increase dedicated activity to at least 3 times per week.  Will follow up 1 year with labs.

## 2024-04-11 NOTE — PROGRESS NOTES
TSH 0.28 10/27/2023 11:05 AM        IRON   Lab Results   Component Value Date/Time    IRON 82 04/05/2024 07:37 AM        IONIZED CALCIUM   No results found for: \"IONCA\", \"CAION\"      Assessment:       Diagnosis Orders   1. S/P gastric sleeve procedure        2. BMI 35.0-35.9,adult        3. Diabetes mellitus type II, non insulin dependent (HCC)        4. Depression, unspecified depression type        5. Anxiety        6. Hypothyroidism, unspecified type        7. Hypertension, unspecified type        8. Hypercholesteremia        9. DIANNE on CPAP            Plan:    Stay well hydrated. Drink a minimum of 64 oz of non-carbonated, non-caffeinated fluids daily.  Nutritional education occurred during visit. Tolerating diet. Continue following with dietitian and follow their recommendations as directed. Continue  60-80 grams of protein each day.    Signs and symptoms reviewed with patient that would be concerning and need her to return to office for re-evaluation. Patient will call if any questions or concerns arrise.  Importance of physical activity discussed with patient.   Increase physical activity as tolerated  Add strength training  Continue taking Multivitamin/Iron ( goal of 36-45 mg iron daily)  Encouraged to attend support groups  Annual labs reviewed with patient today  SECA scale completed and reviewed with patient today  Measurements completed and reviewed with patient today  Wear CPAP Qhs- adjust pressure with pulmonary as needed  Monitor blood sugars- adjust medication with PCP as needed. Current A1C 7.8.   Monitor BP- adjust medication with PCP as needed.    Avoid slider foods/ sweets/cookies- encouraged kitchen clean out  Increase dedicated activity to at least 3 times per week.  Will follow up in 1 year with labs per pt request.   Return in about 1 year (around 4/11/2025) for postop follow up.       I spent over 31 minutes with the patient, with greater that 50% of that time spent on face counseling for

## 2024-11-01 NOTE — TELEPHONE ENCOUNTER
Please call patient. Her hearing aid is back from repair and can be picked up.   N/C Received signed and completed form from Physician's Office.    Completed form was faxed to Nolan Gambino

## 2025-04-03 DIAGNOSIS — E78.5 HYPERLIPIDEMIA, UNSPECIFIED HYPERLIPIDEMIA TYPE: ICD-10-CM

## 2025-04-03 DIAGNOSIS — E55.9 VITAMIN D DEFICIENCY: ICD-10-CM

## 2025-04-03 DIAGNOSIS — Z13.21 MALNUTRITION SCREEN: ICD-10-CM

## 2025-04-03 DIAGNOSIS — E11.9 DIABETES MELLITUS TYPE II, NON INSULIN DEPENDENT (HCC): ICD-10-CM

## 2025-04-03 DIAGNOSIS — E03.9 HYPOTHYROIDISM, UNSPECIFIED TYPE: ICD-10-CM

## 2025-04-03 DIAGNOSIS — Z90.3 S/P GASTRIC SLEEVE PROCEDURE: Primary | ICD-10-CM

## 2025-04-03 DIAGNOSIS — K91.2 POSTOPERATIVE MALABSORPTION: ICD-10-CM

## 2025-04-16 LAB
ALBUMIN: 4.8 G/DL
ALP BLD-CCNC: 57 U/L
ALT SERPL-CCNC: 25 U/L
ANION GAP SERPL CALCULATED.3IONS-SCNC: 17 MMOL/L
AST SERPL-CCNC: 19 U/L
BASOPHILS ABSOLUTE: 0.04 /ΜL
BASOPHILS RELATIVE PERCENT: 0.5 %
BILIRUB SERPL-MCNC: 0.4 MG/DL (ref 0.1–1.4)
BUN BLDV-MCNC: 12 MG/DL
CALCIUM SERPL-MCNC: 10.2 MG/DL
CHLORIDE BLD-SCNC: 99 MMOL/L
CO2: 26 MMOL/L
CREAT SERPL-MCNC: 0.7 MG/DL
EOSINOPHILS ABSOLUTE: 0.56 /ΜL
EOSINOPHILS RELATIVE PERCENT: 6.8 %
ESTIMATED AVERAGE GLUCOSE: NORMAL
FERRITIN: 287 NG/ML (ref 9–150)
FOLATE: 23.2
GFR, ESTIMATED: >=60
GLUCOSE BLD-MCNC: 163 MG/DL
HBA1C MFR BLD: 8.7 %
HCT VFR BLD CALC: 37.7 % (ref 36–46)
HEMOGLOBIN: 12.4 G/DL (ref 12–16)
IRON % SATURATION: 37
IRON: 99
LYMPHOCYTES ABSOLUTE: 2.71 /ΜL
LYMPHOCYTES RELATIVE PERCENT: 32.9 %
MCH RBC QN AUTO: 30.3 PG
MCHC RBC AUTO-ENTMCNC: 32.9 G/DL
MCV RBC AUTO: 92.2 FL
MONOCYTES ABSOLUTE: 0.63 /ΜL
MONOCYTES RELATIVE PERCENT: 7.7 %
NEUTROPHILS ABSOLUTE: 4.26 /ΜL
NEUTROPHILS RELATIVE PERCENT: 51.7 %
PLATELET # BLD: 353 K/ΜL
PMV BLD AUTO: NORMAL FL
POTASSIUM SERPL-SCNC: 3.6 MMOL/L
PREALBUMIN: 32 MG/DL
PTH INTACT: 72.4
RBC # BLD: 4.09 10^6/ΜL
SODIUM BLD-SCNC: 138 MMOL/L
TOTAL IRON BINDING CAPACITY: 171
TOTAL PROTEIN: 7.3 G/DL (ref 6.4–8.2)
TSH SERPL DL<=0.05 MIU/L-ACNC: 1.24 UIU/ML
VITAMIN B-12: 821
VITAMIN D 25-HYDROXY: 49.7
VITAMIN D2, 25 HYDROXY: NORMAL
VITAMIN D3,25 HYDROXY: NORMAL
WBC # BLD: 8.23 10^3/ML

## 2025-04-29 ENCOUNTER — OFFICE VISIT (OUTPATIENT)
Dept: BARIATRICS/WEIGHT MGMT | Age: 60
End: 2025-04-29
Payer: MEDICAID

## 2025-04-29 VITALS
WEIGHT: 232.8 LBS | HEART RATE: 79 BPM | DIASTOLIC BLOOD PRESSURE: 74 MMHG | BODY MASS INDEX: 35.28 KG/M2 | SYSTOLIC BLOOD PRESSURE: 108 MMHG | TEMPERATURE: 93.6 F | HEIGHT: 68 IN

## 2025-04-29 DIAGNOSIS — F32.A DEPRESSION, UNSPECIFIED DEPRESSION TYPE: ICD-10-CM

## 2025-04-29 DIAGNOSIS — E78.00 HYPERCHOLESTEREMIA: ICD-10-CM

## 2025-04-29 DIAGNOSIS — E03.9 HYPOTHYROIDISM, UNSPECIFIED TYPE: ICD-10-CM

## 2025-04-29 DIAGNOSIS — I10 HYPERTENSION, UNSPECIFIED TYPE: ICD-10-CM

## 2025-04-29 DIAGNOSIS — G47.33 OSA ON CPAP: ICD-10-CM

## 2025-04-29 DIAGNOSIS — Z90.3 S/P GASTRIC SLEEVE PROCEDURE: Primary | ICD-10-CM

## 2025-04-29 DIAGNOSIS — F41.9 ANXIETY: ICD-10-CM

## 2025-04-29 DIAGNOSIS — E11.9 DIABETES MELLITUS TYPE II, NON INSULIN DEPENDENT (HCC): ICD-10-CM

## 2025-04-29 PROCEDURE — 3074F SYST BP LT 130 MM HG: CPT | Performed by: PHYSICIAN ASSISTANT

## 2025-04-29 PROCEDURE — 3052F HG A1C>EQUAL 8.0%<EQUAL 9.0%: CPT | Performed by: PHYSICIAN ASSISTANT

## 2025-04-29 PROCEDURE — 99213 OFFICE O/P EST LOW 20 MIN: CPT | Performed by: PHYSICIAN ASSISTANT

## 2025-04-29 PROCEDURE — 3078F DIAST BP <80 MM HG: CPT | Performed by: PHYSICIAN ASSISTANT

## 2025-04-29 RX ORDER — TIRZEPATIDE 5 MG/.5ML
INJECTION, SOLUTION SUBCUTANEOUS
COMMUNITY
Start: 2025-04-24

## 2025-04-29 NOTE — PATIENT INSTRUCTIONS
Stay well hydrated. Drink a minimum of 64 oz of non-carbonated, non-caffeinated fluids daily.  Nutritional education occurred during visit. Tolerating diet. Continue following with dietitian and follow their recommendations as directed. Continue  60-80 grams of protein each day.    Signs and symptoms reviewed with patient that would be concerning and need her to return to office for re-evaluation. Patient will call if any questions or concerns arrise.  Importance of physical activity discussed with patient.   Increase physical activity as tolerated  Add strength training  Continue taking Multivitamin / Iron and Vit D  Encouraged to attend support groups  Annual labs reviewed with patient today  Ferritin 287- to follow with PCP.  A1C 8.7. meds adjusted with PCP- will be repeating in 3 months  SECA scale completed and reviewed with patient today  Measurements completed and reviewed with patient today  Wear CPAP Qhs- adjust pressure with pulmonary as needed  Monitor blood sugars- adjust medication with PCP as needed. Current A1C 8.7.   Monitor BP- adjust medication with PCP as needed.    Offered more frequent apts with RD/ PA- to call if desires  Wean off pop  Encouraged kitchen clean out. Avoid junk food/sweets.   Will follow up in 1 year with labs per pt request.

## 2025-04-29 NOTE — PROGRESS NOTES
[facility]  [address], [address]  [place] [address]  [phone]      Visit Date:  4/29/2025  Weight Management Postop Follow-up    HPI:      Maye Fisher is a 59 y.o. female who is here today for 3 year follow up since  robotic-assisted sleeve gastrectomy performed by Dr. Sanchez  on 3/28/22.  Weight today 232#. Down 1# since last visit. Down 38# since surgery. BMI 35 . Drinking plenty of fluids- water/ Gatorade Zero/ sugar-free flavor packets and 1-2 cups of coffee daily.  Drinking 1-2 cans of Dr. Pepper per week.  Occasional protein shakes. Eating 2-3 meals and 1-2 snacks daily. Admits that she could be making better choices with snacks. Admits that food choices could be better.  Admits that she has been eating out more frequently.  Tolerating post-op diet. No problems with bowel movements. No nausea. No emesis. No GERD. Denies CP/SOB. No Dizziness. No abdominal pain. Taking and tolerating all vitamins- Centrum Womens MV/  Iron 36 mg/ Vit D.     Annual labs completed and reviewed.     Ferritin  287 ( 238)- to follow with PCP.  A1C 8.7. On Metformin/ Glipizide.  Started Moujaro through PCP.   Wearing CPAP  BP stable with medication  Thyroid stable in labs- following with PCP.   Depression and anxiety stable with medication    SECA scale completed and reviewed.     Long discussion on importance of lifestyle changes, making better decisions with nutrition and increasing dedicated activity to be successful lifelong with weight loss with or without bariatric surgery.       Physical Activity: None currently. Has membership at [employer] and planning to get started with a friend.     Current BMI: Body mass index is 35.4 kg/m².  Current Weight:   Wt Readings from Last 3 Encounters:   04/29/25 105.6 kg (232 lb 12.8 oz)   04/11/24 105.7 kg (233 lb)   04/12/23 106 kg (233 lb 9.6 oz)     Pre-op Body Weight:270      Past Medical History:  Past Medical History:   Diagnosis Date

## (undated) DEVICE — VESSEL SEALER EXTEND: Brand: ENDOWRIST

## (undated) DEVICE — 35 ML SYRINGE LUER-LOCK TIP: Brand: MONOJECT

## (undated) DEVICE — INTENDED FOR TISSUE SEPARATION, AND OTHER PROCEDURES THAT REQUIRE A SHARP SURGICAL BLADE TO PUNCTURE OR CUT.: Brand: BARD-PARKER ® CARBON RIB-BACK BLADES

## (undated) DEVICE — STAPLE ENDOSCP BIOABSORB LN REINF ENDOPATH SEAMGUARD BLK

## (undated) DEVICE — STRIP,CLOSURE,WOUND,MEDI-STRIP,1/2X4: Brand: MEDLINE

## (undated) DEVICE — TROCAR: Brand: KII FIOS FIRST ENTRY

## (undated) DEVICE — 3M™ STERI-STRIP™ COMPOUND BENZOIN TINCTURE 40 BAGS/CARTON 4 CARTONS/CASE C1544: Brand: 3M™ STERI-STRIP™

## (undated) DEVICE — GOWN,SIRUS,NON REINFRCD,LARGE,SET IN SL: Brand: MEDLINE

## (undated) DEVICE — PENCIL SMK EVAC ALL IN 1 DSGN ENH VISIBILITY IMPROVED AIR

## (undated) DEVICE — SUTURE VCRL + SZ 4-0 L27IN ABSRB WHT FS-2 3/8 CIR REV CUT VCP422H

## (undated) DEVICE — HYPODERMIC SAFETY NEEDLE: Brand: MAGELLAN

## (undated) DEVICE — GLOVE ORANGE PI 7   MSG9070

## (undated) DEVICE — PACK-MAJOR

## (undated) DEVICE — REDUCER: Brand: ENDOWRIST

## (undated) DEVICE — APPLICATOR MEDICATED 26 CC SOLUTION HI LT ORNG CHLORAPREP

## (undated) DEVICE — ARM DRAPE

## (undated) DEVICE — ELECTRO LUBE IS A SINGLE PATIENT USE DEVICE THAT IS INTENDED TO BE USED ON ELECTROSURGICAL ELECTRODES TO REDUCE STICKING.: Brand: KEY SURGICAL ELECTRO LUBE

## (undated) DEVICE — BANDAGE COMPR W6INXL5YD WHT BGE POLY COT M E WRP WV HK AND

## (undated) DEVICE — SEAL

## (undated) DEVICE — SYRINGE MED 10ML LUERLOCK TIP W/O SFTY DISP

## (undated) DEVICE — STAPLE INT BIOABSORBABLE REINF LN SUREFORM 60 GRN SEAMGRD

## (undated) DEVICE — PUMP SUC IRR TBNG L10FT W/ HNDPC ASSEMB STRYKEFLOW 2

## (undated) DEVICE — STAPLER 60 RELOAD GREEN: Brand: SUREFORM

## (undated) DEVICE — BLADELESS OBTURATOR: Brand: WECK VISTA

## (undated) DEVICE — TUBING INSUFFLATOR HEAT HUMIDIFIED SMK EVAC SET PNEUMOCLEAR

## (undated) DEVICE — GOWN,SIRUS,NONRNF,SETINSLV,XL,20/CS: Brand: MEDLINE

## (undated) DEVICE — VISIGI 3D®  CALIBRATION SYSTEM  SIZE 40FR STD W/ BULB: Brand: BOEHRINGER® VISIGI 3D™ SLEEVE GASTRECTOMY CALIBRATION SYSTEM, SIZE 40FR W/BULB

## (undated) DEVICE — PACK,UNIVERSAL,NO GOWNS: Brand: MEDLINE

## (undated) DEVICE — PACK PROCEDURE SURG SET UP SRMC

## (undated) DEVICE — STAPLER 60: Brand: SUREFORM

## (undated) DEVICE — COLUMN DRAPE

## (undated) DEVICE — CANNULA SEAL

## (undated) DEVICE — SOLUTION ANTIFOG VIS SYS CLEARIFY LAPSCP

## (undated) DEVICE — GLOVE ORANGE PI 8   MSG9080

## (undated) DEVICE — SUTURE ETHBND EXCEL SZ 0 L30IN NONABSORBABLE GRN L26MM SH X834H

## (undated) DEVICE — SUTURE VCRL + SZ 0 L27IN ABSRB VLT L26MM UR-6 5/8 CIR VCP603H

## (undated) DEVICE — STAPLER 60 RELOAD BLACK: Brand: SUREFORM

## (undated) DEVICE — BASIC SINGLE BASIN BTC-LF: Brand: MEDLINE INDUSTRIES, INC.

## (undated) DEVICE — GAUZE,SPONGE,8"X4",12PLY,XRAY,STRL,LF: Brand: MEDLINE

## (undated) DEVICE — BANDAGE ADH W1XL3IN NAT FAB WVN FLX DURABLE N ADH PD SEAL

## (undated) DEVICE — ABDOMINAL BINDER: Brand: DEROYAL